# Patient Record
Sex: FEMALE | Race: ASIAN | NOT HISPANIC OR LATINO
[De-identification: names, ages, dates, MRNs, and addresses within clinical notes are randomized per-mention and may not be internally consistent; named-entity substitution may affect disease eponyms.]

---

## 2017-03-13 ENCOUNTER — LAB SERVICES (OUTPATIENT)
Dept: OTHER | Age: 47
End: 2017-03-13

## 2017-03-13 ENCOUNTER — CHARTING TRANS (OUTPATIENT)
Dept: RHEUMATOLOGY | Age: 47
End: 2017-03-13

## 2017-03-13 LAB
25(OH)D3 SERPL-MCNC: 16.7 NG/ML (ref 30–100)
ALBUMIN SERPL BCG-MCNC: 4.3 G/DL (ref 3.6–5.1)
ALP SERPL-CCNC: 115 U/L (ref 45–105)
ALT SERPL W/O P-5'-P-CCNC: 13 U/L (ref 7–34)
ANA SER QL IA: NORMAL RATIO (ref 0–0.6)
AST SERPL-CCNC: 15 U/L (ref 9–37)
BILIRUB DIRECT SERPL-MCNC: 0.1 MG/DL (ref 0–0.2)
BILIRUB SERPL-MCNC: 0.3 MG/DL (ref 0–1)
C3 SERPL-MCNC: 119 MG/DL (ref 88–165)
C4 SERPL-MCNC: 45.1 MG/DL (ref 14–44)
CCP IGG FLD IA-ACNC: 2 U/ML (ref 0–7)
CK SERPL-CCNC: 85 U/L (ref 30–135)
CRP SERPL-MCNC: 0.7 MG/DL (ref 0–1)
DSDNA IGG SERPL IA-ACNC: NORMAL [IU]/ML (ref 0–10)
LDH SERPL P TO L-CCNC: 538 U/L (ref 313–618)
PROT SERPL-MCNC: 7 G/DL (ref 6.2–8.1)
RHEUMATOID FACT SERPL-ACNC: <8.6 [IU]/ML
SEDIMENTATION RATE, RBC: 19 MM/H (ref 0–20)
TSH SERPL DL<=0.05 MIU/L-ACNC: 2.33 M[IU]/L (ref 0.3–4.82)

## 2017-03-16 LAB
PATH INTERP SPEC-IMP: NORMAL
PROT SERPL-MCNC: 7.4 G/DL (ref 6.4–8.2)

## 2017-04-13 ENCOUNTER — HOSPITAL (OUTPATIENT)
Dept: OTHER | Age: 47
End: 2017-04-13
Attending: INTERNAL MEDICINE

## 2017-04-13 ENCOUNTER — CHARTING TRANS (OUTPATIENT)
Dept: OTHER | Age: 47
End: 2017-04-13

## 2017-07-28 DIAGNOSIS — R56.9 SEIZURE (HCC): Primary | ICD-10-CM

## 2017-07-28 RX ORDER — LEVETIRACETAM 250 MG/1
TABLET ORAL
Qty: 90 TABLET | Refills: 0 | Status: SHIPPED | OUTPATIENT
Start: 2017-07-28 | End: 2017-08-21

## 2017-08-21 ENCOUNTER — OFFICE VISIT (OUTPATIENT)
Dept: NEUROLOGY | Facility: CLINIC | Age: 47
End: 2017-08-21

## 2017-08-21 VITALS
WEIGHT: 179 LBS | SYSTOLIC BLOOD PRESSURE: 107 MMHG | HEIGHT: 66 IN | BODY MASS INDEX: 28.77 KG/M2 | DIASTOLIC BLOOD PRESSURE: 62 MMHG | HEART RATE: 74 BPM | RESPIRATION RATE: 16 BRPM

## 2017-08-21 DIAGNOSIS — R56.9 PSEUDOSEIZURES (HCC): ICD-10-CM

## 2017-08-21 DIAGNOSIS — G25.0 TREMOR, ESSENTIAL: ICD-10-CM

## 2017-08-21 DIAGNOSIS — G43.009 MIGRAINE WITHOUT AURA AND WITHOUT STATUS MIGRAINOSUS, NOT INTRACTABLE: ICD-10-CM

## 2017-08-21 DIAGNOSIS — G40.209 COMPLEX PARTIAL SEIZURE EVOLVING TO GENERALIZED SEIZURE (HCC): Primary | ICD-10-CM

## 2017-08-21 PROCEDURE — 99214 OFFICE O/P EST MOD 30 MIN: CPT | Performed by: OTHER

## 2017-08-21 RX ORDER — LEVETIRACETAM 250 MG/1
250 TABLET ORAL
COMMUNITY
End: 2017-08-21

## 2017-08-21 RX ORDER — SUMATRIPTAN 100 MG/1
100 TABLET, FILM COATED ORAL
Qty: 9 TABLET | Refills: 5 | Status: SHIPPED | OUTPATIENT
Start: 2017-08-21 | End: 2018-04-20

## 2017-08-21 RX ORDER — LEVETIRACETAM 250 MG/1
TABLET ORAL
Qty: 120 TABLET | Refills: 5 | Status: SHIPPED | OUTPATIENT
Start: 2017-08-21 | End: 2018-02-27

## 2017-08-21 NOTE — PATIENT INSTRUCTIONS
Refill policies:    • Allow 2-3 business days for refills; controlled substances may take longer.   • Contact your pharmacy at least 5 days prior to running out of medication and have them send an electronic request or submit request through the [facility] have a procedure or additional testing performed. [facility]) will contact your insurance carrier to obtain pre-certification or prior authorization.     Unfortunately, [facility] has seen an increase in denial of payment even though the p

## 2017-08-21 NOTE — PROGRESS NOTES
[facility] with [address]  7/2/1970  Primary Care Provider:  Ruby Rogers MD    8/21/2017    49year old yo patient being seen for:  Pseudo-seizures    No spells for a w Resp 16   Ht 66\"   Wt 179 lb   BMI 28.89 kg/m²   Looks stated age  Pink conjunctiva anicteric sclerae, moist mucosa  No LAD, no thyromegaly  Lungs clear breath sounds  Heart S1S2, no abnormal sounds  Pink nailbeds no Cyanosis, pulses palpated    Alert wi

## 2017-08-30 ENCOUNTER — HOSPITAL ENCOUNTER (OUTPATIENT)
Dept: MRI IMAGING | Age: 47
Discharge: HOME | End: 2017-08-30
Attending: Other
Payer: COMMERCIAL

## 2017-08-30 DIAGNOSIS — G25.0 TREMOR, ESSENTIAL: ICD-10-CM

## 2017-08-30 DIAGNOSIS — G40.209 COMPLEX PARTIAL SEIZURE EVOLVING TO GENERALIZED SEIZURE (HCC): ICD-10-CM

## 2017-08-30 DIAGNOSIS — R56.9 PSEUDOSEIZURES (HCC): ICD-10-CM

## 2017-08-30 DIAGNOSIS — G43.009 MIGRAINE WITHOUT AURA AND WITHOUT STATUS MIGRAINOSUS, NOT INTRACTABLE: ICD-10-CM

## 2017-08-30 PROCEDURE — A9575 INJ GADOTERATE MEGLUMI 0.1ML: HCPCS | Performed by: OTHER

## 2017-08-30 PROCEDURE — 70553 MRI BRAIN STEM W/O & W/DYE: CPT | Performed by: OTHER

## 2017-12-25 ENCOUNTER — APPOINTMENT (OUTPATIENT)
Dept: GENERAL RADIOLOGY | Facility: HOSPITAL | Age: 47
End: 2017-12-25
Payer: COMMERCIAL

## 2017-12-25 ENCOUNTER — HOSPITAL ENCOUNTER (EMERGENCY)
Facility: HOSPITAL | Age: 47
Discharge: HOME | End: 2017-12-25
Attending: EMERGENCY MEDICINE
Payer: COMMERCIAL

## 2017-12-25 VITALS
DIASTOLIC BLOOD PRESSURE: 73 MMHG | HEART RATE: 71 BPM | TEMPERATURE: 98 F | RESPIRATION RATE: 18 BRPM | WEIGHT: 172 LBS | OXYGEN SATURATION: 96 % | HEIGHT: 66 IN | BODY MASS INDEX: 27.64 KG/M2 | SYSTOLIC BLOOD PRESSURE: 105 MMHG

## 2017-12-25 DIAGNOSIS — R09.1 PLEURISY: Primary | ICD-10-CM

## 2017-12-25 PROCEDURE — 80048 BASIC METABOLIC PNL TOTAL CA: CPT | Performed by: EMERGENCY MEDICINE

## 2017-12-25 PROCEDURE — 85025 COMPLETE CBC W/AUTO DIFF WBC: CPT | Performed by: EMERGENCY MEDICINE

## 2017-12-25 PROCEDURE — 85378 FIBRIN DEGRADE SEMIQUANT: CPT | Performed by: EMERGENCY MEDICINE

## 2017-12-25 PROCEDURE — 36415 COLL VENOUS BLD VENIPUNCTURE: CPT

## 2017-12-25 PROCEDURE — 71010 XR CHEST AP PORTABLE  (CPT=71010): CPT | Performed by: EMERGENCY MEDICINE

## 2017-12-25 PROCEDURE — 99284 EMERGENCY DEPT VISIT MOD MDM: CPT

## 2017-12-25 PROCEDURE — 94640 AIRWAY INHALATION TREATMENT: CPT

## 2017-12-25 RX ORDER — PREDNISONE 20 MG/1
60 TABLET ORAL ONCE
Status: COMPLETED | OUTPATIENT
Start: 2017-12-25 | End: 2017-12-25

## 2017-12-25 RX ORDER — IPRATROPIUM BROMIDE AND ALBUTEROL SULFATE 2.5; .5 MG/3ML; MG/3ML
3 SOLUTION RESPIRATORY (INHALATION) ONCE AS NEEDED
Status: COMPLETED | OUTPATIENT
Start: 2017-12-25 | End: 2017-12-25

## 2017-12-25 RX ORDER — HYDROCODONE BITARTRATE AND ACETAMINOPHEN 5; 325 MG/1; MG/1
2 TABLET ORAL ONCE
Status: COMPLETED | OUTPATIENT
Start: 2017-12-25 | End: 2017-12-25

## 2017-12-25 RX ORDER — TOPIRAMATE 50 MG/1
100 TABLET, FILM COATED ORAL 2 TIMES DAILY
COMMUNITY
End: 2020-01-24 | Stop reason: DRUGHIGH

## 2017-12-25 RX ORDER — PREDNISONE 50 MG/1
50 TABLET ORAL DAILY
Qty: 10 TABLET | Refills: 0 | Status: SHIPPED | OUTPATIENT
Start: 2017-12-25 | End: 2017-12-30

## 2017-12-25 RX ORDER — HYDROCODONE BITARTRATE AND ACETAMINOPHEN 5; 325 MG/1; MG/1
1-2 TABLET ORAL EVERY 4 HOURS PRN
Qty: 20 TABLET | Refills: 0 | Status: SHIPPED | OUTPATIENT
Start: 2017-12-25 | End: 2018-01-01

## 2017-12-26 NOTE — ED PROVIDER NOTES
Patient Seen in: [facility] Emergency Department    History   Patient presents with:  Dyspnea MOHIT SOB (respiratory)    Stated Complaint: mohit    HPI    Patient is a 50-year-old woman history of asthma comes in for left-sided constant chest pain start Temporal [12/25/17 2056]  SpO2: 100 % [12/25/17 2056]  O2 Device: None (Room air) [12/25/17 2055]    Current:/80   Pulse 80   Temp 98 °F (36.7 °C) (Temporal)   Resp 20   Ht 167.6 cm (5' 6\")   Wt 78 kg   SpO2 99%   BMI 27.76 kg/m²         Physical Ex Trimester:  0.32-1.29 ug/mL (FEU)    3rd Trimester:  0.13-1.70 ug/mL (FEU)    In pregnant females, refer to the chart above.   No studies have been performed  on pregnant females exclusively to validate the 95% negative predictive value  for venous thromboe cardiac monitor and pulse oximetry was applied. Patient had an IV established and labs were drawn. Patient is given albuterol Atrovent for wheezing. She feels much better.   Patient chest discomfort is reproducible pleuritic and constant unlikely to

## 2017-12-26 NOTE — ED INITIAL ASSESSMENT (HPI)
Pt reports worsening cough w/ SOB - has hx of asthma and using inhaler w/ no relief    Reports bronchitis last week and getting tx for it

## 2017-12-31 ENCOUNTER — APPOINTMENT (OUTPATIENT)
Dept: CT IMAGING | Facility: HOSPITAL | Age: 47
End: 2017-12-31
Attending: EMERGENCY MEDICINE
Payer: COMMERCIAL

## 2017-12-31 ENCOUNTER — APPOINTMENT (OUTPATIENT)
Dept: GENERAL RADIOLOGY | Facility: HOSPITAL | Age: 47
End: 2017-12-31
Attending: EMERGENCY MEDICINE
Payer: COMMERCIAL

## 2017-12-31 ENCOUNTER — HOSPITAL ENCOUNTER (EMERGENCY)
Facility: HOSPITAL | Age: 47
Discharge: HOME | End: 2017-12-31
Attending: EMERGENCY MEDICINE
Payer: COMMERCIAL

## 2017-12-31 VITALS
BODY MASS INDEX: 27.63 KG/M2 | RESPIRATION RATE: 18 BRPM | OXYGEN SATURATION: 100 % | TEMPERATURE: 97 F | HEIGHT: 66 IN | WEIGHT: 171.94 LBS | DIASTOLIC BLOOD PRESSURE: 81 MMHG | HEART RATE: 80 BPM | SYSTOLIC BLOOD PRESSURE: 123 MMHG

## 2017-12-31 DIAGNOSIS — R09.1 PLEURISY: Primary | ICD-10-CM

## 2017-12-31 LAB
BASOPHILS # BLD AUTO: 0.01 X10(3) UL (ref 0–0.1)
BASOPHILS NFR BLD AUTO: 0.1 %
BILIRUB UR QL STRIP.AUTO: NEGATIVE
BUN BLD-MCNC: 14 MG/DL (ref 8–20)
CALCIUM BLD-MCNC: 9.4 MG/DL (ref 8.3–10.3)
CHLORIDE: 105 MMOL/L (ref 101–111)
CLARITY UR REFRACT.AUTO: CLEAR
CO2: 27 MMOL/L (ref 22–32)
COLOR UR AUTO: COLORLESS
CREAT BLD-MCNC: 0.76 MG/DL (ref 0.55–1.02)
EOSINOPHIL # BLD AUTO: 0 X10(3) UL (ref 0–0.3)
EOSINOPHIL NFR BLD AUTO: 0 %
ERYTHROCYTE [DISTWIDTH] IN BLOOD BY AUTOMATED COUNT: 13.5 % (ref 11.5–16)
GLUCOSE BLD-MCNC: 128 MG/DL (ref 70–99)
GLUCOSE UR STRIP.AUTO-MCNC: NEGATIVE MG/DL
HCT VFR BLD AUTO: 46.2 % (ref 34–50)
HGB BLD-MCNC: 15.2 G/DL (ref 12–16)
IMMATURE GRANULOCYTE COUNT: 0.03 X10(3) UL (ref 0–1)
IMMATURE GRANULOCYTE RATIO %: 0.4 %
KETONES UR STRIP.AUTO-MCNC: NEGATIVE MG/DL
LEUKOCYTE ESTERASE UR QL STRIP.AUTO: NEGATIVE
LYMPHOCYTES # BLD AUTO: 0.86 X10(3) UL (ref 0.9–4)
LYMPHOCYTES NFR BLD AUTO: 10.3 %
MCH RBC QN AUTO: 28.7 PG (ref 27–33.2)
MCHC RBC AUTO-ENTMCNC: 32.9 G/DL (ref 31–37)
MCV RBC AUTO: 87.2 FL (ref 81–100)
MONOCYTES # BLD AUTO: 0.07 X10(3) UL (ref 0.1–0.6)
MONOCYTES NFR BLD AUTO: 0.8 %
NEUTROPHIL ABS PRELIM: 7.34 X10 (3) UL (ref 1.3–6.7)
NEUTROPHILS # BLD AUTO: 7.34 X10(3) UL (ref 1.3–6.7)
NEUTROPHILS NFR BLD AUTO: 88.4 %
NITRITE UR QL STRIP.AUTO: NEGATIVE
PH UR STRIP.AUTO: 8 [PH] (ref 4.5–8)
PLATELET # BLD AUTO: 259 10(3)UL (ref 150–450)
POCT LOT NUMBER: NORMAL
POCT URINE PREGNANCY: NEGATIVE
POTASSIUM SERPL-SCNC: 3.7 MMOL/L (ref 3.6–5.1)
PROT UR STRIP.AUTO-MCNC: NEGATIVE MG/DL
RBC # BLD AUTO: 5.3 X10(6)UL (ref 3.8–5.1)
RED CELL DISTRIBUTION WIDTH-SD: 42.7 FL (ref 35.1–46.3)
SODIUM SERPL-SCNC: 140 MMOL/L (ref 136–144)
SP GR UR STRIP.AUTO: <1.005 (ref 1–1.03)
UROBILINOGEN UR STRIP.AUTO-MCNC: <2 MG/DL
WBC # BLD AUTO: 8.3 X10(3) UL (ref 4–13)

## 2017-12-31 PROCEDURE — 93010 ELECTROCARDIOGRAM REPORT: CPT

## 2017-12-31 PROCEDURE — 96374 THER/PROPH/DIAG INJ IV PUSH: CPT

## 2017-12-31 PROCEDURE — 71010 XR CHEST AP PORTABLE  (CPT=71010): CPT | Performed by: EMERGENCY MEDICINE

## 2017-12-31 PROCEDURE — 71275 CT ANGIOGRAPHY CHEST: CPT | Performed by: EMERGENCY MEDICINE

## 2017-12-31 PROCEDURE — 80048 BASIC METABOLIC PNL TOTAL CA: CPT | Performed by: EMERGENCY MEDICINE

## 2017-12-31 PROCEDURE — 93005 ELECTROCARDIOGRAM TRACING: CPT

## 2017-12-31 PROCEDURE — 81025 URINE PREGNANCY TEST: CPT

## 2017-12-31 PROCEDURE — 96375 TX/PRO/DX INJ NEW DRUG ADDON: CPT

## 2017-12-31 PROCEDURE — 85025 COMPLETE CBC W/AUTO DIFF WBC: CPT | Performed by: EMERGENCY MEDICINE

## 2017-12-31 PROCEDURE — 99285 EMERGENCY DEPT VISIT HI MDM: CPT

## 2017-12-31 PROCEDURE — 81001 URINALYSIS AUTO W/SCOPE: CPT | Performed by: EMERGENCY MEDICINE

## 2017-12-31 RX ORDER — KETOROLAC TROMETHAMINE 30 MG/ML
15 INJECTION, SOLUTION INTRAMUSCULAR; INTRAVENOUS ONCE
Status: COMPLETED | OUTPATIENT
Start: 2017-12-31 | End: 2017-12-31

## 2017-12-31 RX ORDER — CYCLOBENZAPRINE HCL 10 MG
10 TABLET ORAL 3 TIMES DAILY PRN
Qty: 20 TABLET | Refills: 0 | Status: SHIPPED | OUTPATIENT
Start: 2017-12-31 | End: 2018-01-07

## 2017-12-31 RX ORDER — LIDOCAINE 50 MG/G
1 PATCH TOPICAL EVERY 24 HOURS
Qty: 15 PATCH | Refills: 0 | Status: SHIPPED | OUTPATIENT
Start: 2017-12-31 | End: 2018-03-02

## 2017-12-31 RX ORDER — MORPHINE SULFATE 4 MG/ML
4 INJECTION, SOLUTION INTRAMUSCULAR; INTRAVENOUS EVERY 30 MIN PRN
Status: DISCONTINUED | OUTPATIENT
Start: 2017-12-31 | End: 2017-12-31

## 2017-12-31 NOTE — ED INITIAL ASSESSMENT (HPI)
Pt was here on 24th - dx w/ pleurisy. C/O increasing pain over the last few days, unable to take deep breath, and lay on left side.

## 2017-12-31 NOTE — ED PROVIDER NOTES
Patient Seen in: [facility] Emergency Department    History   Patient presents with:  Pain (neurologic)    Stated Complaint: luq pain     HPI    54-year-old with a history of seizures, migraines, GERD, back pain, fibromyalgia presents for evaluation Other systems are as noted in HPI. Constitutional and vital signs reviewed. All other systems reviewed and negative except as noted above.     Physical Exam   ED Triage Vitals [12/31/17 1003]  BP: 136/91  Pulse: 98  Resp: 18  Temp: (!) 97.4 °F (36.3 ---------                               -----------         ------                     CBC W/ DIFFERENTIAL[[id number]]          Abnormal            Final result                 Please view results for these tests on the individual orders.    RAINBOW DRAW B Prescribed:  Current Discharge Medication List    START taking these medications    lidocaine 5 % External Patch  Place 1 patch onto the skin daily.   Qty: 15 patch Refills: 0    Cyclobenzaprine HCl 10 MG Oral Tab  Take 1 tablet (10 mg total) by mouth 3 (th

## 2018-01-01 LAB
ATRIAL RATE: 80 BPM
P AXIS: 22 DEGREES
P-R INTERVAL: 144 MS
Q-T INTERVAL: 368 MS
QRS DURATION: 86 MS
QTC CALCULATION (BEZET): 424 MS
R AXIS: 44 DEGREES
T AXIS: 34 DEGREES
VENTRICULAR RATE: 80 BPM

## 2018-01-02 ENCOUNTER — HOSPITAL ENCOUNTER (INPATIENT)
Facility: HOSPITAL | Age: 48
LOS: 1 days | Discharge: HOME | DRG: 313 | End: 2018-01-04
Attending: EMERGENCY MEDICINE | Admitting: SPECIALIST
Payer: COMMERCIAL

## 2018-01-02 ENCOUNTER — APPOINTMENT (OUTPATIENT)
Dept: GENERAL RADIOLOGY | Facility: HOSPITAL | Age: 48
DRG: 313 | End: 2018-01-02
Attending: EMERGENCY MEDICINE
Payer: COMMERCIAL

## 2018-01-02 DIAGNOSIS — R07.9 ACUTE CHEST PAIN: Primary | ICD-10-CM

## 2018-01-02 LAB
ALBUMIN SERPL-MCNC: 3.6 G/DL (ref 3.5–4.8)
ALP LIVER SERPL-CCNC: 105 U/L (ref 39–100)
ALT SERPL-CCNC: 27 U/L (ref 14–54)
AST SERPL-CCNC: 25 U/L (ref 15–41)
BASOPHILS # BLD AUTO: 0.02 X10(3) UL (ref 0–0.1)
BASOPHILS NFR BLD AUTO: 0.2 %
BILIRUB SERPL-MCNC: 0.3 MG/DL (ref 0.1–2)
BUN BLD-MCNC: 14 MG/DL (ref 8–20)
CALCIUM BLD-MCNC: 9.1 MG/DL (ref 8.3–10.3)
CHLORIDE: 109 MMOL/L (ref 101–111)
CO2: 22 MMOL/L (ref 22–32)
CREAT BLD-MCNC: 0.91 MG/DL (ref 0.55–1.02)
EOSINOPHIL # BLD AUTO: 0 X10(3) UL (ref 0–0.3)
EOSINOPHIL NFR BLD AUTO: 0 %
ERYTHROCYTE [DISTWIDTH] IN BLOOD BY AUTOMATED COUNT: 13.7 % (ref 11.5–16)
GLUCOSE BLD-MCNC: 149 MG/DL (ref 70–99)
HCT VFR BLD AUTO: 48.1 % (ref 34–50)
HGB BLD-MCNC: 15.8 G/DL (ref 12–16)
IMMATURE GRANULOCYTE COUNT: 0.07 X10(3) UL (ref 0–1)
IMMATURE GRANULOCYTE RATIO %: 0.7 %
LYMPHOCYTES # BLD AUTO: 1.22 X10(3) UL (ref 0.9–4)
LYMPHOCYTES NFR BLD AUTO: 12.4 %
M PROTEIN MFR SERPL ELPH: 7.8 G/DL (ref 6.1–8.3)
MCH RBC QN AUTO: 28.7 PG (ref 27–33.2)
MCHC RBC AUTO-ENTMCNC: 32.8 G/DL (ref 31–37)
MCV RBC AUTO: 87.5 FL (ref 81–100)
MONOCYTES # BLD AUTO: 0.14 X10(3) UL (ref 0.1–0.6)
MONOCYTES NFR BLD AUTO: 1.4 %
NEUTROPHIL ABS PRELIM: 8.42 X10 (3) UL (ref 1.3–6.7)
NEUTROPHILS # BLD AUTO: 8.42 X10(3) UL (ref 1.3–6.7)
NEUTROPHILS NFR BLD AUTO: 85.3 %
PLATELET # BLD AUTO: 284 10(3)UL (ref 150–450)
POTASSIUM SERPL-SCNC: 4.3 MMOL/L (ref 3.6–5.1)
RBC # BLD AUTO: 5.5 X10(6)UL (ref 3.8–5.1)
RED CELL DISTRIBUTION WIDTH-SD: 44.1 FL (ref 35.1–46.3)
SODIUM SERPL-SCNC: 139 MMOL/L (ref 136–144)
TROPONIN: <0.046 NG/ML (ref ?–0.05)
WBC # BLD AUTO: 9.9 X10(3) UL (ref 4–13)

## 2018-01-02 PROCEDURE — 71045 X-RAY EXAM CHEST 1 VIEW: CPT | Performed by: EMERGENCY MEDICINE

## 2018-01-02 RX ORDER — SODIUM CHLORIDE 9 MG/ML
INJECTION, SOLUTION INTRAVENOUS CONTINUOUS
Status: DISCONTINUED | OUTPATIENT
Start: 2018-01-02 | End: 2018-01-04

## 2018-01-02 RX ORDER — BISACODYL 10 MG
10 SUPPOSITORY, RECTAL RECTAL
Status: DISCONTINUED | OUTPATIENT
Start: 2018-01-02 | End: 2018-01-04

## 2018-01-02 RX ORDER — ONDANSETRON 2 MG/ML
4 INJECTION INTRAMUSCULAR; INTRAVENOUS EVERY 6 HOURS PRN
Status: DISCONTINUED | OUTPATIENT
Start: 2018-01-02 | End: 2018-01-04

## 2018-01-02 RX ORDER — ENOXAPARIN SODIUM 100 MG/ML
40 INJECTION SUBCUTANEOUS NIGHTLY
Status: DISCONTINUED | OUTPATIENT
Start: 2018-01-02 | End: 2018-01-04

## 2018-01-02 RX ORDER — CYCLOBENZAPRINE HCL 10 MG
10 TABLET ORAL 3 TIMES DAILY PRN
Status: DISCONTINUED | OUTPATIENT
Start: 2018-01-02 | End: 2018-01-04

## 2018-01-02 RX ORDER — HYDROCODONE BITARTRATE AND ACETAMINOPHEN 5; 325 MG/1; MG/1
1 TABLET ORAL EVERY 4 HOURS PRN
Status: DISCONTINUED | OUTPATIENT
Start: 2018-01-02 | End: 2018-01-04

## 2018-01-02 RX ORDER — HYDROMORPHONE HYDROCHLORIDE 1 MG/ML
1 INJECTION, SOLUTION INTRAMUSCULAR; INTRAVENOUS; SUBCUTANEOUS EVERY 30 MIN PRN
Status: COMPLETED | OUTPATIENT
Start: 2018-01-02 | End: 2018-01-03

## 2018-01-02 RX ORDER — HYDROMORPHONE HYDROCHLORIDE 1 MG/ML
0.5 INJECTION, SOLUTION INTRAMUSCULAR; INTRAVENOUS; SUBCUTANEOUS EVERY 30 MIN PRN
Status: ACTIVE | OUTPATIENT
Start: 2018-01-02 | End: 2018-01-02

## 2018-01-02 RX ORDER — METHYLPREDNISOLONE SODIUM SUCCINATE 125 MG/2ML
125 INJECTION, POWDER, LYOPHILIZED, FOR SOLUTION INTRAMUSCULAR; INTRAVENOUS ONCE
Status: COMPLETED | OUTPATIENT
Start: 2018-01-02 | End: 2018-01-02

## 2018-01-02 RX ORDER — ZOLPIDEM TARTRATE 5 MG/1
5 TABLET ORAL NIGHTLY PRN
Status: DISCONTINUED | OUTPATIENT
Start: 2018-01-02 | End: 2018-01-04

## 2018-01-02 RX ORDER — POLYETHYLENE GLYCOL 3350 17 G/17G
17 POWDER, FOR SOLUTION ORAL DAILY PRN
Status: DISCONTINUED | OUTPATIENT
Start: 2018-01-02 | End: 2018-01-04

## 2018-01-02 RX ORDER — SUMATRIPTAN 50 MG/1
100 TABLET, FILM COATED ORAL
Status: DISCONTINUED | OUTPATIENT
Start: 2018-01-02 | End: 2018-01-04

## 2018-01-02 RX ORDER — ACETAMINOPHEN 325 MG/1
650 TABLET ORAL EVERY 4 HOURS PRN
Status: DISCONTINUED | OUTPATIENT
Start: 2018-01-02 | End: 2018-01-04

## 2018-01-02 RX ORDER — MORPHINE SULFATE 4 MG/ML
1 INJECTION, SOLUTION INTRAMUSCULAR; INTRAVENOUS EVERY 2 HOUR PRN
Status: DISCONTINUED | OUTPATIENT
Start: 2018-01-02 | End: 2018-01-03 | Stop reason: SDUPTHER

## 2018-01-02 RX ORDER — SODIUM PHOSPHATE, DIBASIC AND SODIUM PHOSPHATE, MONOBASIC 7; 19 G/133ML; G/133ML
1 ENEMA RECTAL ONCE AS NEEDED
Status: DISCONTINUED | OUTPATIENT
Start: 2018-01-02 | End: 2018-01-04

## 2018-01-02 RX ORDER — MORPHINE SULFATE 4 MG/ML
2 INJECTION, SOLUTION INTRAMUSCULAR; INTRAVENOUS EVERY 2 HOUR PRN
Status: DISCONTINUED | OUTPATIENT
Start: 2018-01-02 | End: 2018-01-03 | Stop reason: SDUPTHER

## 2018-01-02 RX ORDER — TOPIRAMATE 25 MG/1
50 TABLET ORAL 2 TIMES DAILY
Status: DISCONTINUED | OUTPATIENT
Start: 2018-01-02 | End: 2018-01-04

## 2018-01-02 RX ORDER — LEVETIRACETAM 250 MG/1
250 TABLET ORAL 2 TIMES DAILY
Status: DISCONTINUED | OUTPATIENT
Start: 2018-01-02 | End: 2018-01-03

## 2018-01-02 RX ORDER — HYDROCODONE BITARTRATE AND ACETAMINOPHEN 5; 325 MG/1; MG/1
2 TABLET ORAL EVERY 4 HOURS PRN
Status: DISCONTINUED | OUTPATIENT
Start: 2018-01-02 | End: 2018-01-04

## 2018-01-02 RX ORDER — DOCUSATE SODIUM 100 MG/1
100 CAPSULE, LIQUID FILLED ORAL 2 TIMES DAILY
Status: DISCONTINUED | OUTPATIENT
Start: 2018-01-02 | End: 2018-01-04

## 2018-01-02 RX ORDER — ALBUTEROL SULFATE 90 UG/1
2 AEROSOL, METERED RESPIRATORY (INHALATION) EVERY 6 HOURS PRN
Status: DISCONTINUED | OUTPATIENT
Start: 2018-01-02 | End: 2018-01-04

## 2018-01-02 RX ORDER — MORPHINE SULFATE 4 MG/ML
4 INJECTION, SOLUTION INTRAMUSCULAR; INTRAVENOUS EVERY 2 HOUR PRN
Status: DISCONTINUED | OUTPATIENT
Start: 2018-01-02 | End: 2018-01-03 | Stop reason: SDUPTHER

## 2018-01-03 ENCOUNTER — APPOINTMENT (OUTPATIENT)
Dept: CV DIAGNOSTICS | Facility: HOSPITAL | Age: 48
DRG: 313 | End: 2018-01-03
Attending: SPECIALIST
Payer: COMMERCIAL

## 2018-01-03 ENCOUNTER — APPOINTMENT (OUTPATIENT)
Dept: MRI IMAGING | Facility: HOSPITAL | Age: 48
DRG: 313 | End: 2018-01-03
Attending: Other
Payer: COMMERCIAL

## 2018-01-03 LAB
ALBUMIN SERPL-MCNC: 3.1 G/DL (ref 3.5–4.8)
ALP LIVER SERPL-CCNC: 80 U/L (ref 39–100)
ALT SERPL-CCNC: 21 U/L (ref 14–54)
AST SERPL-CCNC: 13 U/L (ref 15–41)
ATRIAL RATE: 101 BPM
BASOPHILS # BLD AUTO: 0.01 X10(3) UL (ref 0–0.1)
BASOPHILS NFR BLD AUTO: 0.1 %
BILIRUB SERPL-MCNC: 0.2 MG/DL (ref 0.1–2)
BUN BLD-MCNC: 17 MG/DL (ref 8–20)
CALCIUM BLD-MCNC: 8.4 MG/DL (ref 8.3–10.3)
CHLORIDE: 107 MMOL/L (ref 101–111)
CHOLEST SMN-MCNC: 223 MG/DL (ref ?–200)
CO2: 22 MMOL/L (ref 22–32)
CREAT BLD-MCNC: 0.77 MG/DL (ref 0.55–1.02)
EOSINOPHIL # BLD AUTO: 0 X10(3) UL (ref 0–0.3)
EOSINOPHIL NFR BLD AUTO: 0 %
ERYTHROCYTE [DISTWIDTH] IN BLOOD BY AUTOMATED COUNT: 13.6 % (ref 11.5–16)
GLUCOSE BLD-MCNC: 140 MG/DL (ref 70–99)
HCT VFR BLD AUTO: 41.6 % (ref 34–50)
HDLC SERPL-MCNC: 94 MG/DL (ref 45–?)
HDLC SERPL: 2.37 {RATIO} (ref ?–4.44)
HGB BLD-MCNC: 13.6 G/DL (ref 12–16)
IMMATURE GRANULOCYTE COUNT: 0.08 X10(3) UL (ref 0–1)
IMMATURE GRANULOCYTE RATIO %: 0.7 %
LDLC SERPL CALC-MCNC: 90 MG/DL (ref ?–130)
LYMPHOCYTES # BLD AUTO: 1.03 X10(3) UL (ref 0.9–4)
LYMPHOCYTES NFR BLD AUTO: 8.6 %
M PROTEIN MFR SERPL ELPH: 6.4 G/DL (ref 6.1–8.3)
MCH RBC QN AUTO: 28.9 PG (ref 27–33.2)
MCHC RBC AUTO-ENTMCNC: 32.7 G/DL (ref 31–37)
MCV RBC AUTO: 88.5 FL (ref 81–100)
MONOCYTES # BLD AUTO: 0.08 X10(3) UL (ref 0.1–0.6)
MONOCYTES NFR BLD AUTO: 0.7 %
NEUTROPHIL ABS PRELIM: 10.75 X10 (3) UL (ref 1.3–6.7)
NEUTROPHILS # BLD AUTO: 10.75 X10(3) UL (ref 1.3–6.7)
NEUTROPHILS NFR BLD AUTO: 89.9 %
NONHDLC SERPL-MCNC: 129 MG/DL (ref ?–130)
P AXIS: 28 DEGREES
P-R INTERVAL: 132 MS
PLATELET # BLD AUTO: 242 10(3)UL (ref 150–450)
POTASSIUM SERPL-SCNC: 4 MMOL/L (ref 3.6–5.1)
Q-T INTERVAL: 334 MS
QRS DURATION: 82 MS
QTC CALCULATION (BEZET): 433 MS
R AXIS: 45 DEGREES
RBC # BLD AUTO: 4.7 X10(6)UL (ref 3.8–5.1)
RED CELL DISTRIBUTION WIDTH-SD: 44 FL (ref 35.1–46.3)
SED RATE-ML: 7 MM/HR (ref 0–25)
SODIUM SERPL-SCNC: 140 MMOL/L (ref 136–144)
T AXIS: 48 DEGREES
TRIGL SERPL-MCNC: 193 MG/DL (ref ?–150)
VENTRICULAR RATE: 101 BPM
VLDLC SERPL CALC-MCNC: 39 MG/DL (ref 5–40)
WBC # BLD AUTO: 12 X10(3) UL (ref 4–13)

## 2018-01-03 PROCEDURE — 93306 TTE W/DOPPLER COMPLETE: CPT | Performed by: SPECIALIST

## 2018-01-03 PROCEDURE — 72157 MRI CHEST SPINE W/O & W/DYE: CPT | Performed by: OTHER

## 2018-01-03 RX ORDER — MORPHINE SULFATE 2 MG/ML
2 INJECTION, SOLUTION INTRAMUSCULAR; INTRAVENOUS EVERY 2 HOUR PRN
Status: DISCONTINUED | OUTPATIENT
Start: 2018-01-03 | End: 2018-01-04

## 2018-01-03 RX ORDER — LEVETIRACETAM 500 MG/1
500 TABLET ORAL 2 TIMES DAILY
Status: DISCONTINUED | OUTPATIENT
Start: 2018-01-03 | End: 2018-01-04

## 2018-01-03 RX ORDER — MORPHINE SULFATE 2 MG/ML
4 INJECTION, SOLUTION INTRAMUSCULAR; INTRAVENOUS EVERY 2 HOUR PRN
Status: DISCONTINUED | OUTPATIENT
Start: 2018-01-03 | End: 2018-01-04

## 2018-01-03 RX ORDER — MORPHINE SULFATE 2 MG/ML
1 INJECTION, SOLUTION INTRAMUSCULAR; INTRAVENOUS EVERY 2 HOUR PRN
Status: DISCONTINUED | OUTPATIENT
Start: 2018-01-03 | End: 2018-01-04

## 2018-01-03 NOTE — ED NOTES
Pt crying, hyperventilating, appears very anxious. She was holding on to her L chest, \"it hurts, it's tight. \"  aware. IV Dilaudid given. Pt advised to relax and slow down breathing, not compliant at this time.  Will monitor
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
## 2018-01-03 NOTE — H&P
[address]    PATIENT'S NAME: Mary Ann Mendiola   ATTENDING PHYSICIAN: Joey De La Rosa M.D.    PATIENT ACCOUNT#:   [de-identified]    LOCATION:  [address] [place] [address]  MEDICAL RECORD #:   [id number]       YOB: 1970  ADMISSION DATE:       01/0 smoking, alcohol, or drug abuse. PHYSICAL EXAMINATION:    GENERAL:  Condition fair, awake, alert. HEENT:  Head atraumatic. Eyes EOMI. NECK:  Supple. No JVD. LUNGS:  Clear to auscultation. No rhonchi or wheeze. HEART:  Regular rate and rhythm.   No

## 2018-01-03 NOTE — ED PROVIDER NOTES
Patient Seen in: [facility] Emergency Department    History   Patient presents with:  Dyspnea ANIA SOB (respiratory)    Stated Complaint: shortness of breath    HPI    48-year-old female who comes the hospital with a complaint of having sharp pain in noted above.     Physical Exam   ED Triage Vitals [01/02/18 9224]  BP: 111/65  Pulse: 100  Resp: 15  Temp: (!) 97.5 °F (36.4 °C)  Temp src: Temporal  SpO2: 99 %  O2 Device: None (Room air)    Current:/84   Pulse 98   Temp (!) 97.5 °F (36.4 °C) (Tempor tachycardia  Reading: Agreed         Ct Angiography, Chest (cpt=71275)    Result Date: 12/31/2017  PROCEDURE:  CT ANGIOGRAPHY, CHEST (CPT=71275)  COMPARISON:  None.   INDICATIONS:  luq pain  TECHNIQUE:  IV contrast-enhanced multislice CT angiography is perf week.    FINDINGS:  Lungs are free of infiltrate, effusion, and pneumothorax. The cardiac silhouette is within normal limits. No congestion. CONCLUSION:  1. No acute intrathoracic process.     Dictated by: Alber Valderrama MD on 1/02/2018 at 19:43 2100  ------------------------------------------------------------       MDM     This is this is the patient's third return to the emergency room for the same complaint and worker's been negative.   I feel at this point the patient be placed in the hospital

## 2018-01-03 NOTE — PROGRESS NOTES
01/02/18 2015 01/02/18 2045 01/02/18 2107   Vital Signs   /79 122/84 116/77   BP Location Right arm Right arm Right arm   BP Method Automatic Automatic Automatic   Patient Position Lying Sitting Standing     Orthostatic neg    Hx: seizures, tremor

## 2018-01-03 NOTE — ED INITIAL ASSESSMENT (HPI)
Pt was seen in ED on 12/31/17 and was diagnosed with pleurisy. Pt still having difficulty breathing and c/o pain left flank.

## 2018-01-03 NOTE — ED NOTES
Assumed care, pt resting, no distress noted. States pain better after Dilaudid given, \"it's 8/10. \" Will monitor

## 2018-01-03 NOTE — PAYOR COMM NOTE
--------------  ADMISSION REVIEW     Payor: [employer] STATE P[employer]  Subscriber #:  [id number]  Authorization Number: N/A    Admit date: N/A  Admit time: N/A       Admitting Physician: Winter Green MD  Attending Physician:   Winter Green MD  Primary disorders(V13.59)    • Seizure disorder [facility])    • Vitamin D deficiency      Past Surgical History:  2001:       Comment: x 1  No date: OPEN RX HEEL FRACTURE Left  : OTHER SURGICAL HISTORY      Comment: cyst removed right hand    Review of Sy Agreed[MP.3]    Ct Angiography, Chest (cpt=71275)  Result Date: 12/31/2017  PROCEDURE:  CT ANGIOGRAPHY, CHEST (CPT=71275)  COMPARISON:  None.   INDICATIONS:  luq pain  TECHNIQUE:  IV contrast-enhanced multislice CT angiography is performed through the pulmo are free of infiltrate, effusion, and pneumothorax. The cardiac silhouette is within normal limits. No congestion. CONCLUSION:  1. No acute intrathoracic process.     Dictated by: Mike Garrett MD on 1/02/2018 at 19:43     Approved by: Donald Wright 2100  ------------------------------------------------------------[MP. 2]   [facility][MP.1]   This is this is the patient's third return to the emergency room for the same complaint and workups been negative.   I feel at this point the patient be placed in the hos inhaler 1 puff     Date Action Dose Route User    1/3/2018 0923 Given 1 puff Inhalation Eveline Escobedo RCP      HYDROmorphone HCl PF (DILAUDID) 1 MG/ML injection 1 mg     Date Action Dose Route User    1/3/2018 1056 Given 1 mg Intravenous Akash Wen

## 2018-01-04 VITALS
RESPIRATION RATE: 20 BRPM | SYSTOLIC BLOOD PRESSURE: 102 MMHG | HEIGHT: 63 IN | TEMPERATURE: 99 F | DIASTOLIC BLOOD PRESSURE: 63 MMHG | HEART RATE: 73 BPM | WEIGHT: 175.5 LBS | BODY MASS INDEX: 31.1 KG/M2 | OXYGEN SATURATION: 95 %

## 2018-01-04 PROCEDURE — 99233 SBSQ HOSP IP/OBS HIGH 50: CPT | Performed by: OTHER

## 2018-01-04 NOTE — CONSULTS
[facility]  Report of Consultation    Alvina Service Patient Status:  Inpatient    1970 MRN [id number]   [facility] 8NE-A Attending Bryanna Sanchez MD   Hosp Day # 1 PCP Charlotte Torres MD     Reason for Consultation:  Chest RA   • Breast Cancer Paternal Aunt 61     alive   • Seizure Disorder Brother      diagnosed in 2004- 3 years younger than her   • Other [OTHER] Other      No adrenal, thyroid, pituitary or parathyroid issues.    • Seizure Disorder Maternal Uncle    • Diabet 10 mg, Rectal, Daily PRN  •  FLEET ENEMA (FLEET) 7-19 GM/118ML enema 133 mL, 1 enema, Rectal, Once PRN  •  ondansetron HCl (ZOFRAN) injection 4 mg, 4 mg, Intravenous, Q6H PRN    Review of Systems:  All systems were reviewed and are negative except as descr Pseudoseizure     Migraine without status migrainosus, not intractable     Convulsion, non-epileptic (HCC)     Intractable migraine     Status epilepticus (HCC)     Seizure cerebral     Migraine without aura and with status migrainosus, not intractable

## 2018-01-04 NOTE — PROGRESS NOTES
[address] [facility] Neurology Progress Note    Cassondra Gowers Patient Status:  Inpatient    1970 MRN [id number]   [facility] 8NE-A Attending Venus Cardoza MD   Hosp Day # 1 PCP Hector Gomez MD     CC: Hx of seizures    Subj cardiac in nature  · Hx of seizures- no recent  · Continue Keppra 500mg BID  · Hx of migraines- no recent  · Continue Topamax 50mg BID    Patient with a history of seizures and migraines, who presented for ongoing left flank pain.   MRI of thoracic spine es with her pain and that since there is no objective cause of her pain on my exam I was merely suggesting possibly alternative to her home OTC analgesics.  She yelled and told me not to talk with her like that like I would suggest a medication that may not he

## 2018-01-04 NOTE — PLAN OF CARE
Assumed care at 2045. Alert and oriented x4. IVF infusing. Seizure precautions maintained per protocol. MRI complete. Assessment remains unchanged from beginning of shift. Call light in reach at the bedside. Will continue to monitor.     CARDIOVASCULAR - AD

## 2018-01-04 NOTE — PROGRESS NOTES
[facility]  Discharge Summary    Esperanza Hearing Patient Status:  Inpatient    1970 MRN [id number]   [facility] 8NE-A Attending Zi Alan MD   Hosp Day # 1 PCP Franklin Bay MD     Date of Admission: 2018    Date refer to chart for details    Disposition: Home or Self Care    Discharge Condition: Stable    Discharge Medications: Current Discharge Medication List    CONTINUE these medications which have NOT CHANGED    lidocaine 5 % External Patch  Place 1 patch onto

## 2018-02-28 RX ORDER — LEVETIRACETAM 250 MG/1
TABLET ORAL
Qty: 120 TABLET | Refills: 2 | Status: SHIPPED | OUTPATIENT
Start: 2018-02-28 | End: 2018-03-02

## 2018-02-28 NOTE — TELEPHONE ENCOUNTER
Medication: Keppra 250 mg    Date of last refill: 8/21/17 with 5 addt refills  [place]     Last office visit: 08/21/17  Due back to clinic per last office note:  RTN in 6 months by 02/21/18  Date next office visit schedu

## 2018-03-02 ENCOUNTER — OFFICE VISIT (OUTPATIENT)
Dept: NEUROLOGY | Facility: CLINIC | Age: 48
End: 2018-03-02

## 2018-03-02 VITALS
RESPIRATION RATE: 16 BRPM | HEIGHT: 66 IN | DIASTOLIC BLOOD PRESSURE: 74 MMHG | SYSTOLIC BLOOD PRESSURE: 115 MMHG | BODY MASS INDEX: 28.13 KG/M2 | WEIGHT: 175 LBS | HEART RATE: 80 BPM

## 2018-03-02 DIAGNOSIS — R56.9 SEIZURE (HCC): ICD-10-CM

## 2018-03-02 DIAGNOSIS — M79.671 PAIN OF RIGHT HEEL: ICD-10-CM

## 2018-03-02 DIAGNOSIS — G43.009 MIGRAINE WITHOUT AURA AND WITHOUT STATUS MIGRAINOSUS, NOT INTRACTABLE: Primary | ICD-10-CM

## 2018-03-02 DIAGNOSIS — R56.9 PSEUDOSEIZURES (HCC): ICD-10-CM

## 2018-03-02 DIAGNOSIS — M54.14 THORACIC RADICULOPATHY: ICD-10-CM

## 2018-03-02 DIAGNOSIS — G40.209 COMPLEX PARTIAL SEIZURE EVOLVING TO GENERALIZED SEIZURE (HCC): ICD-10-CM

## 2018-03-02 PROCEDURE — 99214 OFFICE O/P EST MOD 30 MIN: CPT | Performed by: OTHER

## 2018-03-02 RX ORDER — LEVETIRACETAM 500 MG/1
500 TABLET ORAL 2 TIMES DAILY
COMMUNITY
End: 2018-05-15

## 2018-03-02 RX ORDER — NICOTINE POLACRILEX 4 MG/1
40 GUM, CHEWING ORAL DAILY
COMMUNITY
End: 2018-10-03

## 2018-03-02 NOTE — PATIENT INSTRUCTIONS
Refill policies:    • Allow 2-3 business days for refills; controlled substances may take longer.   • Contact your pharmacy at least 5 days prior to running out of medication and have them send an electronic request or submit request through the [facility] recommended that you have a procedure or additional testing performed. [facility]) will contact your insurance carrier to obtain pre-certification or prior authorization.     Unfortunately, [facility] has seen an increase in denial of paym

## 2018-03-02 NOTE — PROGRESS NOTES
[facility] with [address]  7/2/1970  Primary Care Provider:  Juan Medina MD    3/2/2018    49year old yo patient being seen for:  Seizure and pseudo-seizures    In Tommy visit, the following items were reviewed: radiology results, and the following relevant information are as noted in appropriate sections above and below.       EXAM:  /74 (BP Location: Left arm, Patient Position: Sitting, Cuff Size: adult)   Pulse 80 Multiple Sclerosis Program  [facility]  3/2/2018, Time completed 9:29 AM    Decision making:  (  ) labs reviewed/ordered - 1  (  ) new diagnosis: - 1  (  ) Images & studies independently reviewed -non F2F  (  ) Case/studies discussed wi

## 2018-03-06 ENCOUNTER — APPOINTMENT (OUTPATIENT)
Dept: LAB | Age: 48
End: 2018-03-06
Attending: Other
Payer: COMMERCIAL

## 2018-03-06 ENCOUNTER — HOSPITAL ENCOUNTER (OUTPATIENT)
Dept: GENERAL RADIOLOGY | Age: 48
Discharge: HOME | End: 2018-03-06
Attending: Other
Payer: COMMERCIAL

## 2018-03-06 DIAGNOSIS — M79.671 PAIN OF RIGHT HEEL: ICD-10-CM

## 2018-03-06 LAB
RHEUMATOID FACT SERPL-ACNC: <10 IU/ML (ref ?–15)
URIC ACID: 4.8 MG/DL (ref 2.4–8)

## 2018-03-06 PROCEDURE — 73610 X-RAY EXAM OF ANKLE: CPT | Performed by: OTHER

## 2018-03-06 PROCEDURE — 84550 ASSAY OF BLOOD/URIC ACID: CPT | Performed by: OTHER

## 2018-03-06 PROCEDURE — 86431 RHEUMATOID FACTOR QUANT: CPT | Performed by: OTHER

## 2018-03-06 PROCEDURE — 86038 ANTINUCLEAR ANTIBODIES: CPT | Performed by: OTHER

## 2018-03-07 LAB — ANA SCREEN: NEGATIVE

## 2018-03-19 ENCOUNTER — TELEPHONE (OUTPATIENT)
Dept: NEUROLOGY | Facility: CLINIC | Age: 48
End: 2018-03-19

## 2018-04-04 ENCOUNTER — TELEPHONE (OUTPATIENT)
Dept: NEUROLOGY | Facility: CLINIC | Age: 48
End: 2018-04-04

## 2018-04-04 NOTE — TELEPHONE ENCOUNTER
No improvement with MDP that was prescribed by podiatrist.   Pain is unchanged; of same character from prior reports (in right heel radiating to hamstring; also in low back; exacerbated by walking and sitting).      Looking for any recommendations on sympto

## 2018-04-08 ENCOUNTER — APPOINTMENT (OUTPATIENT)
Dept: GENERAL RADIOLOGY | Facility: HOSPITAL | Age: 48
End: 2018-04-08
Attending: EMERGENCY MEDICINE
Payer: COMMERCIAL

## 2018-04-08 ENCOUNTER — HOSPITAL ENCOUNTER (EMERGENCY)
Facility: HOSPITAL | Age: 48
Discharge: HOME | End: 2018-04-08
Attending: EMERGENCY MEDICINE
Payer: COMMERCIAL

## 2018-04-08 VITALS
TEMPERATURE: 98 F | HEIGHT: 66 IN | DIASTOLIC BLOOD PRESSURE: 72 MMHG | SYSTOLIC BLOOD PRESSURE: 112 MMHG | BODY MASS INDEX: 27.32 KG/M2 | WEIGHT: 170 LBS | RESPIRATION RATE: 18 BRPM | HEART RATE: 60 BPM | OXYGEN SATURATION: 100 %

## 2018-04-08 DIAGNOSIS — M54.31 SCIATICA OF RIGHT SIDE: Primary | ICD-10-CM

## 2018-04-08 PROCEDURE — 99283 EMERGENCY DEPT VISIT LOW MDM: CPT

## 2018-04-08 PROCEDURE — 81003 URINALYSIS AUTO W/O SCOPE: CPT | Performed by: EMERGENCY MEDICINE

## 2018-04-08 PROCEDURE — 72100 X-RAY EXAM L-S SPINE 2/3 VWS: CPT | Performed by: EMERGENCY MEDICINE

## 2018-04-08 RX ORDER — CYCLOBENZAPRINE HCL 10 MG
10 TABLET ORAL 3 TIMES DAILY PRN
Qty: 20 TABLET | Refills: 0 | Status: SHIPPED | OUTPATIENT
Start: 2018-04-08 | End: 2018-10-03

## 2018-04-08 RX ORDER — METHYLPREDNISOLONE 4 MG/1
TABLET ORAL
Qty: 1 PACKAGE | Refills: 0 | Status: SHIPPED | OUTPATIENT
Start: 2018-04-08 | End: 2018-04-13

## 2018-04-08 RX ORDER — NAPROXEN 500 MG/1
500 TABLET ORAL 2 TIMES DAILY PRN
Qty: 20 TABLET | Refills: 0 | Status: SHIPPED | OUTPATIENT
Start: 2018-04-08 | End: 2018-10-03

## 2018-04-08 RX ORDER — TRAMADOL HYDROCHLORIDE 50 MG/1
TABLET ORAL EVERY 4 HOURS PRN
Qty: 20 TABLET | Refills: 0 | Status: SHIPPED | OUTPATIENT
Start: 2018-04-08 | End: 2018-04-15

## 2018-04-08 RX ORDER — HYDROCODONE BITARTRATE AND ACETAMINOPHEN 5; 325 MG/1; MG/1
2 TABLET ORAL ONCE
Status: COMPLETED | OUTPATIENT
Start: 2018-04-08 | End: 2018-04-08

## 2018-04-08 NOTE — ED INITIAL ASSESSMENT (HPI)
Pt c/o right outer foot pain that radiates into her back - sensitive to touch and pt reports unbearable to walk or lift       Denies injury

## 2018-04-08 NOTE — ED PROVIDER NOTES
Patient Seen in: [facility] Emergency Department    History   Patient presents with:  Lower Extremity Injury (musculoskeletal)  Back Pain (musculoskeletal)    Stated Complaint: R LEG/BACK PAIN    HPI    41-year-old female comes the hospital complain reviewed. All other systems reviewed and negative except as noted above.     Physical Exam   ED Triage Vitals [04/08/18 9026]  BP: 123/80  Pulse: 80  Resp: 16  Temp: 97.7 °F (36.5 °C)  Temp src: Temporal  SpO2: 100 %  O2 Device: None (Room air)    Curr arthropathy. CONCLUSION:  Mild degenerative changes within the lumbar spine. No acute displaced osseous fracture.     Dictated by: Chula Conroy MD on 4/08/2018 at 19:34     Approved by: Chula Conroy MD          Medications   HYDROcodone-acet

## 2018-04-23 RX ORDER — SUMATRIPTAN 100 MG/1
TABLET, FILM COATED ORAL
Qty: 9 TABLET | Refills: 2 | Status: SHIPPED | OUTPATIENT
Start: 2018-04-23 | End: 2018-08-29

## 2018-04-23 NOTE — TELEPHONE ENCOUNTER
Medication: Sumatriptan 100 mg    Date of last refill:08/21/17 with 5 addt refills  [place]     Last office visit: 3/2/2018  Due back to clinic per last office note:  RTN in 3 months  Date next office visit scheduled:  F (  ) Telephone time with patiern or authorized Carlene Sorensen F2F  (  ) other records reviewed --non F2F  (  ) Fam meetings - patient not present --non F2F  Non Face to Face CPT code 32591/74108 applies as documented above     PROCEDURE DONE     (   ) see

## 2018-04-30 ENCOUNTER — TELEPHONE (OUTPATIENT)
Dept: NEUROLOGY | Facility: CLINIC | Age: 48
End: 2018-04-30

## 2018-04-30 DIAGNOSIS — G40.909 SEIZURE DISORDER (HCC): Primary | ICD-10-CM

## 2018-04-30 RX ORDER — LEVETIRACETAM 500 MG/1
TABLET ORAL
Qty: 90 TABLET | Refills: 5 | Status: SHIPPED | OUTPATIENT
Start: 2018-04-30 | End: 2018-10-26

## 2018-04-30 NOTE — TELEPHONE ENCOUNTER
S: Calling with seizure    B: Last seen 3/2/18 with notes:  Discussion on the case:  She has had bad reactions to gabapentin in the past and refuses to try it.   Stable seizures and migraines      A: Lasting less than a minute but she experienced 3 one afte

## 2018-05-14 ENCOUNTER — TELEPHONE (OUTPATIENT)
Dept: NEUROLOGY | Facility: CLINIC | Age: 48
End: 2018-05-14

## 2018-05-15 ENCOUNTER — OFFICE VISIT (OUTPATIENT)
Dept: NEUROLOGY | Facility: CLINIC | Age: 48
End: 2018-05-15

## 2018-05-15 VITALS
BODY MASS INDEX: 28.13 KG/M2 | SYSTOLIC BLOOD PRESSURE: 100 MMHG | RESPIRATION RATE: 16 BRPM | HEIGHT: 66 IN | HEART RATE: 74 BPM | DIASTOLIC BLOOD PRESSURE: 62 MMHG | WEIGHT: 175 LBS

## 2018-05-15 DIAGNOSIS — M79.609 PARESTHESIA AND PAIN OF EXTREMITY: Primary | ICD-10-CM

## 2018-05-15 DIAGNOSIS — G43.001 MIGRAINE WITHOUT AURA AND WITH STATUS MIGRAINOSUS, NOT INTRACTABLE: ICD-10-CM

## 2018-05-15 DIAGNOSIS — R20.2 PARESTHESIA AND PAIN OF EXTREMITY: Primary | ICD-10-CM

## 2018-05-15 DIAGNOSIS — I73.00 RAYNAUD'S PHENOMENON WITHOUT GANGRENE: ICD-10-CM

## 2018-05-15 DIAGNOSIS — M77.51 TENDINITIS OF RIGHT ANKLE: ICD-10-CM

## 2018-05-15 DIAGNOSIS — G40.909 SEIZURE CEREBRAL (HCC): ICD-10-CM

## 2018-05-15 PROCEDURE — 99214 OFFICE O/P EST MOD 30 MIN: CPT | Performed by: OTHER

## 2018-05-15 RX ORDER — NABUMETONE 750 MG/1
750 TABLET, FILM COATED ORAL 2 TIMES DAILY
Qty: 60 TABLET | Refills: 0 | Status: SHIPPED | OUTPATIENT
Start: 2018-05-15 | End: 2018-10-03

## 2018-05-15 NOTE — PROGRESS NOTES
[facility] with [address]  7/2/1970  Primary Care Provider:  Gustavo Moreno MD    5/15/2018    49year old yo patient being seen for:  Leg pain     Intermittent tingling i reviewed: medications, and the following relevant information are as noted in appropriate sections above and below.       EXAM:  /62 (BP Location: Left arm, Patient Position: Sitting, Cuff Size: adult)   Pulse 74   Resp 16   Ht 66\"   Wt 175 lb   BMI new diagnosis: - 1  (  ) Images & studies independently reviewed -non F2F  (  ) Case/studies discussed with other caregivers - -non F2F  (  ) Telephone time with patiern or authorized [employer]  (  ) other records reviewed --non F2F  (  ) Topher mcconnell

## 2018-05-15 NOTE — PATIENT INSTRUCTIONS
Refill policies:    • Allow 2-3 business days for refills; controlled substances may take longer.   • Contact your pharmacy at least 5 days prior to running out of medication and have them send an electronic request or submit request through the “request re entire amount billed. Precertification and Prior Authorizations: If your physician has recommended that you have a procedure or additional testing performed.   [facility]) will contact your insurance carrier to obtain pre-certi

## 2018-05-21 RX ORDER — LEVETIRACETAM 250 MG/1
TABLET ORAL
Qty: 120 TABLET | Refills: 0 | OUTPATIENT
Start: 2018-05-21

## 2018-05-21 NOTE — TELEPHONE ENCOUNTER
Medication: Keppra 500 mg    Date of last refill: 04/30/18 with 5 addt refills  [place]     Last office visit: 5/15/2018  Due back to clinic per last office note:  RTN in 6 months  Date next office visit scheduled:   [place]

## 2018-06-08 ENCOUNTER — TELEPHONE (OUTPATIENT)
Dept: NEUROLOGY | Facility: CLINIC | Age: 48
End: 2018-06-08

## 2018-07-03 ENCOUNTER — HOSPITAL ENCOUNTER (OUTPATIENT)
Dept: MRI IMAGING | Age: 48
Discharge: HOME | End: 2018-07-03
Attending: SPECIALIST
Payer: COMMERCIAL

## 2018-07-03 DIAGNOSIS — M79.671 RIGHT FOOT PAIN: ICD-10-CM

## 2018-07-03 DIAGNOSIS — M66.871 NONTRAUMATIC RUPTURE OF POSTERIOR TIBIAL TENDON, RIGHT: ICD-10-CM

## 2018-07-03 DIAGNOSIS — R26.2 DIFFICULTY WALKING: ICD-10-CM

## 2018-07-03 DIAGNOSIS — R60.0 LOCALIZED EDEMA: ICD-10-CM

## 2018-07-03 PROCEDURE — 73721 MRI JNT OF LWR EXTRE W/O DYE: CPT | Performed by: SPECIALIST

## 2018-07-03 PROCEDURE — 73718 MRI LOWER EXTREMITY W/O DYE: CPT | Performed by: SPECIALIST

## 2018-08-29 RX ORDER — SUMATRIPTAN 100 MG/1
TABLET, FILM COATED ORAL
Qty: 9 TABLET | Refills: 5 | Status: SHIPPED | OUTPATIENT
Start: 2018-08-29 | End: 2019-10-23

## 2018-08-29 NOTE — TELEPHONE ENCOUNTER
Medication: Sumatriptan 100 mg     Date of last refill: 04/23/18 with 2 addt refills  [place]      Last office visit: 5/15/2018  Due back to clinic per last office note:  RTN in 6 months  Date next office visit schedu

## 2018-08-30 DIAGNOSIS — G43.909 MIGRAINE WITHOUT STATUS MIGRAINOSUS, NOT INTRACTABLE, UNSPECIFIED MIGRAINE TYPE: Primary | ICD-10-CM

## 2018-08-30 NOTE — TELEPHONE ENCOUNTER
S: Calling with migraine for past 2 weeks    B: On Topamax 50 mg QHS, Keppra for seizures, using sumatriptan for abortive purposes    A: Sumatriptan hasn't worked most recently. She notes auras in her right eye.    R: Willing to try MDP if appropriate.  Wi

## 2018-08-31 RX ORDER — METHYLPREDNISOLONE 4 MG/1
TABLET ORAL
Qty: 1 KIT | Refills: 0 | Status: SHIPPED | OUTPATIENT
Start: 2018-08-31 | End: 2018-10-03 | Stop reason: ALTCHOICE

## 2018-10-03 ENCOUNTER — OFFICE VISIT (OUTPATIENT)
Dept: NEUROLOGY | Facility: CLINIC | Age: 48
End: 2018-10-03
Payer: COMMERCIAL

## 2018-10-03 VITALS
DIASTOLIC BLOOD PRESSURE: 60 MMHG | WEIGHT: 175 LBS | HEART RATE: 69 BPM | HEIGHT: 66 IN | RESPIRATION RATE: 16 BRPM | BODY MASS INDEX: 28.13 KG/M2 | SYSTOLIC BLOOD PRESSURE: 102 MMHG

## 2018-10-03 DIAGNOSIS — R55 SYNCOPE, UNSPECIFIED SYNCOPE TYPE: ICD-10-CM

## 2018-10-03 DIAGNOSIS — R56.9 PSEUDOSEIZURES (HCC): ICD-10-CM

## 2018-10-03 DIAGNOSIS — G43.009 MIGRAINE WITHOUT AURA AND WITHOUT STATUS MIGRAINOSUS, NOT INTRACTABLE: Primary | ICD-10-CM

## 2018-10-03 PROCEDURE — 99214 OFFICE O/P EST MOD 30 MIN: CPT | Performed by: OTHER

## 2018-10-03 RX ORDER — PYRIDOSTIGMINE BROMIDE 60 MG/1
60 TABLET ORAL 4 TIMES DAILY
COMMUNITY
End: 2020-02-24

## 2018-10-03 NOTE — PATIENT INSTRUCTIONS
Refill policies:    • Allow 2-3 business days for refills; controlled substances may take longer.   • Contact your pharmacy at least 5 days prior to running out of medication and have them send an electronic request or submit request through the “request re entire amount billed. Precertification and Prior Authorizations: If your physician has recommended that you have a procedure or additional testing performed.   [facility]) will contact your insurance carrier to obtain pre-certi

## 2018-10-03 NOTE — PROGRESS NOTES
[facility] with [address]  7/2/1970  Primary Care Provider:  Jennifer Kilgore MD    10/3/2018  Accompanied visit:  (x) No ( ) yes    51year old yo patient being seen for: the following relevant information are as noted in appropriate sections above and below.       EXAM:  /60 (BP Location: Left arm, Patient Position: Sitting, Cuff Size: adult)   Pulse 69   Resp 16   Ht 66\"   Wt 175 lb   BMI 28.25 kg/m²   Looks stated test results, follow up will be readjusted        Zenia Dowling MD  Vascular & General Neurology  Director, Multiple Sclerosis Program  [facility]  10/3/2018, Time completed 10:02 AM    Decision making:  (  ) labs reviewed/ordered - 1

## 2018-10-24 ENCOUNTER — HOSPITAL ENCOUNTER (OUTPATIENT)
Dept: GENERAL RADIOLOGY | Facility: HOSPITAL | Age: 48
Discharge: HOME | End: 2018-10-24
Attending: SPECIALIST
Payer: COMMERCIAL

## 2018-10-24 DIAGNOSIS — R07.81 RIB PAIN: ICD-10-CM

## 2018-10-24 PROCEDURE — 71046 X-RAY EXAM CHEST 2 VIEWS: CPT | Performed by: SPECIALIST

## 2018-10-24 PROCEDURE — 71100 X-RAY EXAM RIBS UNI 2 VIEWS: CPT | Performed by: SPECIALIST

## 2018-10-29 RX ORDER — LEVETIRACETAM 500 MG/1
TABLET ORAL
Qty: 90 TABLET | Refills: 1 | Status: SHIPPED | OUTPATIENT
Start: 2018-10-29 | End: 2019-01-03

## 2018-10-29 NOTE — TELEPHONE ENCOUNTER
Medication: Levetiracetam 500 mg    Date of last refill: 04/30/18 with 5 addt refills  [place]     Last office visit: 10/3/2018  Due back to clinic per last office note:  RTN in 3 months  Date next office visit scheduled (  ) Fam meetings - patient not present --non F2F  Non Face to Face CPT code 89610/07281 applies as documented above     PROCEDURE DONE     (   ) see notes  Visits are done with \"open doors and windows\" exam rooms, except when patient requests privacy

## 2018-11-23 ENCOUNTER — IMAGING SERVICES (OUTPATIENT)
Dept: OTHER | Age: 48
End: 2018-11-23

## 2018-11-28 VITALS
RESPIRATION RATE: 16 BRPM | WEIGHT: 188 LBS | BODY MASS INDEX: 30.22 KG/M2 | DIASTOLIC BLOOD PRESSURE: 72 MMHG | HEART RATE: 92 BPM | HEIGHT: 66 IN | SYSTOLIC BLOOD PRESSURE: 188 MMHG

## 2019-01-04 RX ORDER — LEVETIRACETAM 500 MG/1
TABLET ORAL
Qty: 90 TABLET | Refills: 5 | Status: SHIPPED | OUTPATIENT
Start: 2019-01-04 | End: 2019-10-21

## 2019-01-04 NOTE — TELEPHONE ENCOUNTER
Medication: Levetiracetam 500 mg     Date of last refill: 10/29/18 with 1 addt refill  [place]      Last office visit: 10/3/2018  Due back to clinic per last office note:  RTN in 3 months  Date next office visit schedule (  ) Fam meetings - patient not present --non F2F  Non Face to Face CPT code 83628/56473 applies as documented above     PROCEDURE DONE     (   ) see notes  Visits are done with \"open doors and windows\" exam rooms, except when patient requests privacy

## 2019-01-07 NOTE — TELEPHONE ENCOUNTER
Phoned in rx to [address] [facility]. [facility] having Keppra backordered. Patient states that she is not feeling well and advise her to go to ER.  Verbalized understanding

## 2019-01-10 ENCOUNTER — TELEPHONE (OUTPATIENT)
Dept: NEUROLOGY | Facility: CLINIC | Age: 49
End: 2019-01-10

## 2019-01-10 ENCOUNTER — APPOINTMENT (OUTPATIENT)
Dept: LAB | Age: 49
End: 2019-01-10
Attending: Other
Payer: COMMERCIAL

## 2019-01-10 ENCOUNTER — OFFICE VISIT (OUTPATIENT)
Dept: NEUROLOGY | Facility: CLINIC | Age: 49
End: 2019-01-10
Payer: COMMERCIAL

## 2019-01-10 VITALS
BODY MASS INDEX: 28.13 KG/M2 | SYSTOLIC BLOOD PRESSURE: 105 MMHG | WEIGHT: 175 LBS | DIASTOLIC BLOOD PRESSURE: 60 MMHG | RESPIRATION RATE: 16 BRPM | HEIGHT: 66 IN | HEART RATE: 74 BPM

## 2019-01-10 DIAGNOSIS — G40.209 COMPLEX PARTIAL SEIZURE EVOLVING TO GENERALIZED SEIZURE (HCC): Primary | ICD-10-CM

## 2019-01-10 DIAGNOSIS — G43.109 MIGRAINE WITH AURA AND WITHOUT STATUS MIGRAINOSUS, NOT INTRACTABLE: ICD-10-CM

## 2019-01-10 DIAGNOSIS — F44.5 PSEUDOSEIZURE: ICD-10-CM

## 2019-01-10 LAB
ALBUMIN SERPL-MCNC: 3.6 G/DL (ref 3.1–4.5)
ALBUMIN/GLOB SERPL: 1.1 {RATIO} (ref 1–2)
ALP LIVER SERPL-CCNC: 73 U/L (ref 39–100)
ALT SERPL-CCNC: 20 U/L (ref 14–54)
ANION GAP SERPL CALC-SCNC: 8 MMOL/L (ref 0–18)
AST SERPL-CCNC: 13 U/L (ref 15–41)
BASOPHILS # BLD AUTO: 0.03 X10(3) UL (ref 0–0.1)
BASOPHILS NFR BLD AUTO: 0.7 %
BILIRUB SERPL-MCNC: 0.3 MG/DL (ref 0.1–2)
BUN BLD-MCNC: 18 MG/DL (ref 8–20)
BUN/CREAT SERPL: 20.2 (ref 10–20)
CALCIUM BLD-MCNC: 8.9 MG/DL (ref 8.3–10.3)
CHLORIDE SERPL-SCNC: 114 MMOL/L (ref 101–111)
CO2 SERPL-SCNC: 21 MMOL/L (ref 22–32)
CREAT BLD-MCNC: 0.89 MG/DL (ref 0.55–1.02)
EOSINOPHIL # BLD AUTO: 0.13 X10(3) UL (ref 0–0.3)
EOSINOPHIL NFR BLD AUTO: 3.1 %
ERYTHROCYTE [DISTWIDTH] IN BLOOD BY AUTOMATED COUNT: 12.9 % (ref 11.5–16)
GLOBULIN PLAS-MCNC: 3.2 G/DL (ref 2.8–4.4)
GLUCOSE BLD-MCNC: 102 MG/DL (ref 70–99)
HCT VFR BLD AUTO: 41.2 % (ref 34–50)
HGB BLD-MCNC: 13.1 G/DL (ref 12–16)
IMMATURE GRANULOCYTE COUNT: 0.01 X10(3) UL (ref 0–1)
IMMATURE GRANULOCYTE RATIO %: 0.2 %
LYMPHOCYTES # BLD AUTO: 1.54 X10(3) UL (ref 0.9–4)
LYMPHOCYTES NFR BLD AUTO: 37 %
M PROTEIN MFR SERPL ELPH: 6.8 G/DL (ref 6.4–8.2)
MCH RBC QN AUTO: 29 PG (ref 27–33.2)
MCHC RBC AUTO-ENTMCNC: 31.8 G/DL (ref 31–37)
MCV RBC AUTO: 91.4 FL (ref 81–100)
MONOCYTES # BLD AUTO: 0.33 X10(3) UL (ref 0.1–1)
MONOCYTES NFR BLD AUTO: 7.9 %
NEUTROPHIL ABS PRELIM: 2.12 X10 (3) UL (ref 1.3–6.7)
NEUTROPHILS # BLD AUTO: 2.12 X10(3) UL (ref 1.3–6.7)
NEUTROPHILS NFR BLD AUTO: 51.1 %
OSMOLALITY SERPL CALC.SUM OF ELEC: 298 MOSM/KG (ref 275–295)
PLATELET # BLD AUTO: 260 10(3)UL (ref 150–450)
POTASSIUM SERPL-SCNC: 3.8 MMOL/L (ref 3.6–5.1)
RBC # BLD AUTO: 4.51 X10(6)UL (ref 3.8–5.1)
RED CELL DISTRIBUTION WIDTH-SD: 42.1 FL (ref 35.1–46.3)
SODIUM SERPL-SCNC: 143 MMOL/L (ref 136–144)
WBC # BLD AUTO: 4.2 X10(3) UL (ref 4–13)

## 2019-01-10 PROCEDURE — 80177 DRUG SCRN QUAN LEVETIRACETAM: CPT | Performed by: OTHER

## 2019-01-10 PROCEDURE — 99214 OFFICE O/P EST MOD 30 MIN: CPT | Performed by: OTHER

## 2019-01-10 RX ORDER — RANOLAZINE 500 MG/1
500 TABLET, EXTENDED RELEASE ORAL 2 TIMES DAILY
COMMUNITY
End: 2019-03-19

## 2019-01-10 NOTE — PROGRESS NOTES
[facility] with [address]  7/2/1970  Primary Care Provider:  Daryl Newton MD    1/10/2019  Accompanied visit:  (x) No ( ) yes, by:      51year old yo patient being seen DAILY AS NEEDED FOR MIGRAINE., Disp: 9 tablet, Rfl: 5  •  Fluticasone Furoate-Vilanterol (BREO ELLIPTA) 100-25 MCG/INH Inhalation Aerosol Powder, Breath Activated, Inhale 1 puff into the lungs as needed.   , Disp: , Rfl:   •  topiramate 50 MG Oral Tab, Take ) 6-8 months   ( ) yearly  With Mandy Sandoval  ( ) As needed but knows to call if there are problems  ( ) Followup for special test  /or keep scheduled appointment  Patient understands that if needed, based on condition and or test results, follow up will be readj

## 2019-01-10 NOTE — PATIENT INSTRUCTIONS
Refill policies:    • Allow 2-3 business days for refills; controlled substances may take longer.   • Contact your pharmacy at least 5 days prior to running out of medication and have them send an electronic request or submit request through the “request re entire amount billed. Precertification and Prior Authorizations: If your physician has recommended that you have a procedure or additional testing performed.   [facility]) will contact your insurance carrier to obtain pre-certi

## 2019-01-11 ENCOUNTER — HOSPITAL ENCOUNTER (EMERGENCY)
Facility: HOSPITAL | Age: 49
Discharge: HOME | End: 2019-01-11
Attending: EMERGENCY MEDICINE
Payer: COMMERCIAL

## 2019-01-11 VITALS
WEIGHT: 175.06 LBS | BODY MASS INDEX: 28 KG/M2 | RESPIRATION RATE: 15 BRPM | DIASTOLIC BLOOD PRESSURE: 60 MMHG | TEMPERATURE: 98 F | HEART RATE: 76 BPM | SYSTOLIC BLOOD PRESSURE: 97 MMHG | OXYGEN SATURATION: 95 %

## 2019-01-11 DIAGNOSIS — G40.909 RECURRENT SEIZURES (HCC): Primary | ICD-10-CM

## 2019-01-11 LAB
ALBUMIN SERPL-MCNC: 3.6 G/DL (ref 3.1–4.5)
ALBUMIN/GLOB SERPL: 1.2 {RATIO} (ref 1–2)
ALP LIVER SERPL-CCNC: 76 U/L (ref 39–100)
ALT SERPL-CCNC: 21 U/L (ref 14–54)
ANION GAP SERPL CALC-SCNC: 5 MMOL/L (ref 0–18)
AST SERPL-CCNC: 21 U/L (ref 15–41)
BASOPHILS # BLD AUTO: 0.05 X10(3) UL (ref 0–0.1)
BASOPHILS NFR BLD AUTO: 0.9 %
BILIRUB SERPL-MCNC: 0.4 MG/DL (ref 0.1–2)
BILIRUB UR QL STRIP.AUTO: NEGATIVE
BUN BLD-MCNC: 21 MG/DL (ref 8–20)
BUN/CREAT SERPL: 25.3 (ref 10–20)
CALCIUM BLD-MCNC: 8.6 MG/DL (ref 8.3–10.3)
CHLORIDE SERPL-SCNC: 116 MMOL/L (ref 101–111)
CLARITY UR REFRACT.AUTO: CLEAR
CO2 SERPL-SCNC: 25 MMOL/L (ref 22–32)
COLOR UR AUTO: YELLOW
CREAT BLD-MCNC: 0.83 MG/DL (ref 0.55–1.02)
EOSINOPHIL # BLD AUTO: 0.19 X10(3) UL (ref 0–0.3)
EOSINOPHIL NFR BLD AUTO: 3.4 %
ERYTHROCYTE [DISTWIDTH] IN BLOOD BY AUTOMATED COUNT: 12.7 % (ref 11.5–16)
GLOBULIN PLAS-MCNC: 3.1 G/DL (ref 2.8–4.4)
GLUCOSE BLD-MCNC: 115 MG/DL (ref 70–99)
GLUCOSE UR STRIP.AUTO-MCNC: NEGATIVE MG/DL
HCT VFR BLD AUTO: 39.4 % (ref 34–50)
HGB BLD-MCNC: 13.4 G/DL (ref 12–16)
IMMATURE GRANULOCYTE COUNT: 0.01 X10(3) UL (ref 0–1)
IMMATURE GRANULOCYTE RATIO %: 0.2 %
KETONES UR STRIP.AUTO-MCNC: NEGATIVE MG/DL
LYMPHOCYTES # BLD AUTO: 2.91 X10(3) UL (ref 0.9–4)
LYMPHOCYTES NFR BLD AUTO: 52 %
M PROTEIN MFR SERPL ELPH: 6.7 G/DL (ref 6.4–8.2)
MCH RBC QN AUTO: 29.5 PG (ref 27–33.2)
MCHC RBC AUTO-ENTMCNC: 34 G/DL (ref 31–37)
MCV RBC AUTO: 86.8 FL (ref 81–100)
MONOCYTES # BLD AUTO: 0.37 X10(3) UL (ref 0.1–1)
MONOCYTES NFR BLD AUTO: 6.6 %
NEUTROPHIL ABS PRELIM: 2.07 X10 (3) UL (ref 1.3–6.7)
NEUTROPHILS # BLD AUTO: 2.07 X10(3) UL (ref 1.3–6.7)
NEUTROPHILS NFR BLD AUTO: 36.9 %
NITRITE UR QL STRIP.AUTO: NEGATIVE
OSMOLALITY SERPL CALC.SUM OF ELEC: 306 MOSM/KG (ref 275–295)
PH UR STRIP.AUTO: 8 [PH] (ref 4.5–8)
PLATELET # BLD AUTO: 243 10(3)UL (ref 150–450)
POTASSIUM SERPL-SCNC: 3.7 MMOL/L (ref 3.6–5.1)
PROT UR STRIP.AUTO-MCNC: NEGATIVE MG/DL
RBC # BLD AUTO: 4.54 X10(6)UL (ref 3.8–5.1)
RBC UR QL AUTO: NEGATIVE
RED CELL DISTRIBUTION WIDTH-SD: 39.8 FL (ref 35.1–46.3)
SODIUM SERPL-SCNC: 146 MMOL/L (ref 136–144)
SP GR UR STRIP.AUTO: 1.01 (ref 1–1.03)
UROBILINOGEN UR STRIP.AUTO-MCNC: <2 MG/DL
WBC # BLD AUTO: 5.6 X10(3) UL (ref 4–13)

## 2019-01-11 PROCEDURE — 87086 URINE CULTURE/COLONY COUNT: CPT | Performed by: EMERGENCY MEDICINE

## 2019-01-11 PROCEDURE — 99284 EMERGENCY DEPT VISIT MOD MDM: CPT

## 2019-01-11 PROCEDURE — 80053 COMPREHEN METABOLIC PANEL: CPT | Performed by: EMERGENCY MEDICINE

## 2019-01-11 PROCEDURE — 85025 COMPLETE CBC W/AUTO DIFF WBC: CPT | Performed by: EMERGENCY MEDICINE

## 2019-01-11 PROCEDURE — 96365 THER/PROPH/DIAG IV INF INIT: CPT

## 2019-01-11 PROCEDURE — 81001 URINALYSIS AUTO W/SCOPE: CPT | Performed by: EMERGENCY MEDICINE

## 2019-01-11 RX ORDER — IBUPROFEN 600 MG/1
600 TABLET ORAL ONCE
Status: COMPLETED | OUTPATIENT
Start: 2019-01-11 | End: 2019-01-11

## 2019-01-11 NOTE — ED PROVIDER NOTES
Patient Seen in: [facility] Emergency Department    History   Patient presents with:  Seizure Disorder (neurologic)  Headache (neurologic)    Stated Complaint: seizure-pt reports 5 seizures today since 1700, also c/o severe headache     FERNIE Issa reviewed and negative except as noted above.     Physical Exam     ED Triage Vitals [01/11/19 0047]   /70   Pulse 109   Resp 20   Temp 98.1 °F (36.7 °C)   Temp src Temporal   SpO2 99 %   O2 Device None (Room air)       Current:BP 97/60   Pulse 76   Te Abnormality         Status                     ---------                               -----------         ------                     CBC W/ DIFFERENTIAL[[id number]]                              Final result                 Please view results for these julia

## 2019-01-12 LAB — LEVETIRACETAM (KEPPRA): 22 UG/ML

## 2019-01-24 ENCOUNTER — IMAGING SERVICES (OUTPATIENT)
Dept: OTHER | Age: 49
End: 2019-01-24

## 2019-02-25 ENCOUNTER — LAB ENCOUNTER (OUTPATIENT)
Dept: LAB | Facility: HOSPITAL | Age: 49
End: 2019-02-25
Attending: SPECIALIST
Payer: COMMERCIAL

## 2019-02-25 DIAGNOSIS — M18.0 PRIMARY OSTEOARTHRITIS OF BOTH FIRST CARPOMETACARPAL JOINTS: Primary | ICD-10-CM

## 2019-02-25 DIAGNOSIS — R51.9 HEADACHE DISORDER: ICD-10-CM

## 2019-02-25 LAB
ALBUMIN SERPL-MCNC: 4.2 G/DL (ref 3.4–5)
ALBUMIN/GLOB SERPL: 1.3 {RATIO} (ref 1–2)
ALP LIVER SERPL-CCNC: 85 U/L (ref 39–100)
ALT SERPL-CCNC: 23 U/L (ref 13–56)
ANION GAP SERPL CALC-SCNC: 7 MMOL/L (ref 0–18)
AST SERPL-CCNC: 18 U/L (ref 15–37)
BILIRUB SERPL-MCNC: 0.3 MG/DL (ref 0.1–2)
BUN BLD-MCNC: 20 MG/DL (ref 7–18)
BUN/CREAT SERPL: 31.7 (ref 10–20)
CALCIUM BLD-MCNC: 9.3 MG/DL (ref 8.5–10.1)
CHLORIDE SERPL-SCNC: 109 MMOL/L (ref 98–107)
CO2 SERPL-SCNC: 27 MMOL/L (ref 21–32)
CREAT BLD-MCNC: 0.63 MG/DL (ref 0.55–1.02)
GLOBULIN PLAS-MCNC: 3.3 G/DL (ref 2.8–4.4)
GLUCOSE BLD-MCNC: 69 MG/DL (ref 70–99)
M PROTEIN MFR SERPL ELPH: 7.5 G/DL (ref 6.4–8.2)
OSMOLALITY SERPL CALC.SUM OF ELEC: 297 MOSM/KG (ref 275–295)
POTASSIUM SERPL-SCNC: 3.4 MMOL/L (ref 3.5–5.1)
SED RATE-ML: 6 MM/HR (ref 0–25)
SODIUM SERPL-SCNC: 143 MMOL/L (ref 136–145)
T4 FREE SERPL-MCNC: 1.1 NG/DL (ref 0.8–1.7)
TSI SER-ACNC: 1.73 MIU/ML (ref 0.36–3.74)

## 2019-02-25 PROCEDURE — 84439 ASSAY OF FREE THYROXINE: CPT

## 2019-02-25 PROCEDURE — 83519 RIA NONANTIBODY: CPT

## 2019-02-25 PROCEDURE — 84443 ASSAY THYROID STIM HORMONE: CPT

## 2019-02-25 PROCEDURE — 85652 RBC SED RATE AUTOMATED: CPT

## 2019-02-25 PROCEDURE — 36415 COLL VENOUS BLD VENIPUNCTURE: CPT

## 2019-02-25 PROCEDURE — 84238 ASSAY NONENDOCRINE RECEPTOR: CPT

## 2019-02-25 PROCEDURE — 80053 COMPREHEN METABOLIC PANEL: CPT

## 2019-02-27 LAB
ACETYLCHOLINE BINDING AB: 0 NMOL/L
ACETYLCHOLINE BLOCKING AB: 14 %

## 2019-03-15 ENCOUNTER — HOSPITAL ENCOUNTER (EMERGENCY)
Facility: HOSPITAL | Age: 49
Discharge: HOME | End: 2019-03-16
Attending: EMERGENCY MEDICINE
Payer: COMMERCIAL

## 2019-03-15 ENCOUNTER — APPOINTMENT (OUTPATIENT)
Dept: CT IMAGING | Facility: HOSPITAL | Age: 49
End: 2019-03-15
Attending: EMERGENCY MEDICINE
Payer: COMMERCIAL

## 2019-03-15 DIAGNOSIS — R51.9 HEADACHE IN BACK OF HEAD: Primary | ICD-10-CM

## 2019-03-15 DIAGNOSIS — R55 SYNCOPE, NEAR: ICD-10-CM

## 2019-03-15 LAB
ALBUMIN SERPL-MCNC: 3.9 G/DL (ref 3.4–5)
ALBUMIN/GLOB SERPL: 1.2 {RATIO} (ref 1–2)
ALP LIVER SERPL-CCNC: 83 U/L (ref 39–100)
ALT SERPL-CCNC: 24 U/L (ref 13–56)
ANION GAP SERPL CALC-SCNC: 6 MMOL/L (ref 0–18)
AST SERPL-CCNC: 11 U/L (ref 15–37)
BASOPHILS # BLD AUTO: 0.04 X10(3) UL (ref 0–0.2)
BASOPHILS NFR BLD AUTO: 0.4 %
BILIRUB SERPL-MCNC: 0.3 MG/DL (ref 0.1–2)
BILIRUB UR QL STRIP.AUTO: NEGATIVE
BUN BLD-MCNC: 20 MG/DL (ref 7–18)
BUN/CREAT SERPL: 31.7 (ref 10–20)
CALCIUM BLD-MCNC: 9.4 MG/DL (ref 8.5–10.1)
CHLORIDE SERPL-SCNC: 109 MMOL/L (ref 98–107)
CLARITY UR REFRACT.AUTO: CLEAR
CO2 SERPL-SCNC: 27 MMOL/L (ref 21–32)
COLOR UR AUTO: COLORLESS
CREAT BLD-MCNC: 0.63 MG/DL (ref 0.55–1.02)
DEPRECATED RDW RBC AUTO: 44.3 FL (ref 35.1–46.3)
EOSINOPHIL # BLD AUTO: 0.07 X10(3) UL (ref 0–0.7)
EOSINOPHIL NFR BLD AUTO: 0.7 %
ERYTHROCYTE [DISTWIDTH] IN BLOOD BY AUTOMATED COUNT: 13.7 % (ref 11–15)
GLOBULIN PLAS-MCNC: 3.3 G/DL (ref 2.8–4.4)
GLUCOSE BLD-MCNC: 91 MG/DL (ref 70–99)
GLUCOSE UR STRIP.AUTO-MCNC: NEGATIVE MG/DL
HCT VFR BLD AUTO: 42.3 % (ref 35–48)
HGB BLD-MCNC: 14.2 G/DL (ref 12–16)
IMM GRANULOCYTES # BLD AUTO: 0.06 X10(3) UL (ref 0–1)
IMM GRANULOCYTES NFR BLD: 0.6 %
KETONES UR STRIP.AUTO-MCNC: NEGATIVE MG/DL
LEUKOCYTE ESTERASE UR QL STRIP.AUTO: NEGATIVE
LYMPHOCYTES # BLD AUTO: 2.56 X10(3) UL (ref 1–4)
LYMPHOCYTES NFR BLD AUTO: 26.6 %
M PROTEIN MFR SERPL ELPH: 7.2 G/DL (ref 6.4–8.2)
MCH RBC QN AUTO: 30 PG (ref 26–34)
MCHC RBC AUTO-ENTMCNC: 33.6 G/DL (ref 31–37)
MCV RBC AUTO: 89.4 FL (ref 80–100)
MONOCYTES # BLD AUTO: 0.44 X10(3) UL (ref 0.1–1)
MONOCYTES NFR BLD AUTO: 4.6 %
NEUTROPHILS # BLD AUTO: 6.45 X10 (3) UL (ref 1.5–7.7)
NEUTROPHILS # BLD AUTO: 6.45 X10(3) UL (ref 1.5–7.7)
NEUTROPHILS NFR BLD AUTO: 67.1 %
NITRITE UR QL STRIP.AUTO: NEGATIVE
OSMOLALITY SERPL CALC.SUM OF ELEC: 296 MOSM/KG (ref 275–295)
PH UR STRIP.AUTO: 7 [PH] (ref 4.5–8)
PLATELET # BLD AUTO: 237 10(3)UL (ref 150–450)
POTASSIUM SERPL-SCNC: 3.3 MMOL/L (ref 3.5–5.1)
PROT UR STRIP.AUTO-MCNC: NEGATIVE MG/DL
RBC # BLD AUTO: 4.73 X10(6)UL (ref 3.8–5.3)
RBC UR QL AUTO: NEGATIVE
SODIUM SERPL-SCNC: 142 MMOL/L (ref 136–145)
SP GR UR STRIP.AUTO: 1.01 (ref 1–1.03)
UROBILINOGEN UR STRIP.AUTO-MCNC: <2 MG/DL
WBC # BLD AUTO: 9.6 X10(3) UL (ref 4–11)

## 2019-03-15 PROCEDURE — 99285 EMERGENCY DEPT VISIT HI MDM: CPT

## 2019-03-15 PROCEDURE — 96376 TX/PRO/DX INJ SAME DRUG ADON: CPT

## 2019-03-15 PROCEDURE — 93005 ELECTROCARDIOGRAM TRACING: CPT

## 2019-03-15 PROCEDURE — 85025 COMPLETE CBC W/AUTO DIFF WBC: CPT | Performed by: EMERGENCY MEDICINE

## 2019-03-15 PROCEDURE — 70450 CT HEAD/BRAIN W/O DYE: CPT | Performed by: EMERGENCY MEDICINE

## 2019-03-15 PROCEDURE — 81003 URINALYSIS AUTO W/O SCOPE: CPT | Performed by: EMERGENCY MEDICINE

## 2019-03-15 PROCEDURE — 72125 CT NECK SPINE W/O DYE: CPT | Performed by: EMERGENCY MEDICINE

## 2019-03-15 PROCEDURE — 96375 TX/PRO/DX INJ NEW DRUG ADDON: CPT

## 2019-03-15 PROCEDURE — 96374 THER/PROPH/DIAG INJ IV PUSH: CPT

## 2019-03-15 PROCEDURE — 80053 COMPREHEN METABOLIC PANEL: CPT | Performed by: EMERGENCY MEDICINE

## 2019-03-15 PROCEDURE — 93010 ELECTROCARDIOGRAM REPORT: CPT

## 2019-03-15 PROCEDURE — 96361 HYDRATE IV INFUSION ADD-ON: CPT

## 2019-03-15 RX ORDER — ONDANSETRON 2 MG/ML
4 INJECTION INTRAMUSCULAR; INTRAVENOUS ONCE
Status: COMPLETED | OUTPATIENT
Start: 2019-03-15 | End: 2019-03-15

## 2019-03-15 RX ORDER — MORPHINE SULFATE 4 MG/ML
4 INJECTION, SOLUTION INTRAMUSCULAR; INTRAVENOUS ONCE
Status: COMPLETED | OUTPATIENT
Start: 2019-03-15 | End: 2019-03-15

## 2019-03-15 NOTE — ED INITIAL ASSESSMENT (HPI)
Pt states had a syncopal episode at work today. Pt c/o pain to the right side of her head with numbness since 1430 today.

## 2019-03-16 VITALS
BODY MASS INDEX: 26.52 KG/M2 | SYSTOLIC BLOOD PRESSURE: 100 MMHG | OXYGEN SATURATION: 97 % | WEIGHT: 165 LBS | TEMPERATURE: 98 F | DIASTOLIC BLOOD PRESSURE: 65 MMHG | HEIGHT: 66 IN | HEART RATE: 80 BPM | RESPIRATION RATE: 26 BRPM

## 2019-03-16 NOTE — ED NOTES
Pt places on call light and asks to ambulate to restroom and for more pain medication, as the first dose did not work, per patient. Ambulation with slow but steady gait unassisted.

## 2019-03-17 LAB
ATRIAL RATE: 73 BPM
P AXIS: 24 DEGREES
P-R INTERVAL: 150 MS
Q-T INTERVAL: 384 MS
QRS DURATION: 86 MS
QTC CALCULATION (BEZET): 423 MS
R AXIS: 45 DEGREES
T AXIS: 38 DEGREES
VENTRICULAR RATE: 73 BPM

## 2019-03-18 ENCOUNTER — TELEPHONE (OUTPATIENT)
Dept: NEUROLOGY | Facility: CLINIC | Age: 49
End: 2019-03-18

## 2019-03-18 NOTE — TELEPHONE ENCOUNTER
Patient instructed to go to ER for evaluation of sob and syncope spells. She expressed full understanding.

## 2019-03-18 NOTE — TELEPHONE ENCOUNTER
Patient reports that she did feel dizzy but that has passed. No longer feels that she is about to faint. She reports that her right arm is shaking and has been for about 7 minutes.      Normal seizure presentation does not include shaking, but \"my body
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
## 2019-03-19 ENCOUNTER — HOSPITAL ENCOUNTER (OUTPATIENT)
Facility: HOSPITAL | Age: 49
Setting detail: OBSERVATION
Discharge: HOME | End: 2019-03-23
Attending: EMERGENCY MEDICINE | Admitting: INTERNAL MEDICINE
Payer: COMMERCIAL

## 2019-03-19 DIAGNOSIS — R55 SYNCOPE, UNSPECIFIED SYNCOPE TYPE: Primary | ICD-10-CM

## 2019-03-19 DIAGNOSIS — E87.6 HYPOPOTASSEMIA: ICD-10-CM

## 2019-03-19 RX ORDER — METOPROLOL SUCCINATE 25 MG/1
12.5 TABLET, EXTENDED RELEASE ORAL DAILY
COMMUNITY
End: 2019-05-30

## 2019-03-20 PROBLEM — R55 SYNCOPE, UNSPECIFIED SYNCOPE TYPE: Status: ACTIVE | Noted: 2019-03-20

## 2019-03-20 PROCEDURE — 95816 EEG AWAKE AND DROWSY: CPT | Performed by: OTHER

## 2019-03-20 PROCEDURE — 99245 OFF/OP CONSLTJ NEW/EST HI 55: CPT | Performed by: OTHER

## 2019-03-20 RX ORDER — LEVETIRACETAM 500 MG/1
500 TABLET ORAL DAILY
Status: DISCONTINUED | OUTPATIENT
Start: 2019-03-20 | End: 2019-03-23

## 2019-03-20 RX ORDER — POTASSIUM CHLORIDE 1.5 G/1.77G
40 POWDER, FOR SOLUTION ORAL EVERY 4 HOURS
Status: COMPLETED | OUTPATIENT
Start: 2019-03-20 | End: 2019-03-20

## 2019-03-20 RX ORDER — PYRIDOSTIGMINE BROMIDE 60 MG/1
60 TABLET ORAL 2 TIMES DAILY
Status: DISCONTINUED | OUTPATIENT
Start: 2019-03-20 | End: 2019-03-23

## 2019-03-20 RX ORDER — SUMATRIPTAN 50 MG/1
100 TABLET, FILM COATED ORAL DAILY PRN
Status: DISCONTINUED | OUTPATIENT
Start: 2019-03-20 | End: 2019-03-23

## 2019-03-20 RX ORDER — TOPIRAMATE 100 MG/1
100 TABLET, FILM COATED ORAL 2 TIMES DAILY
Status: DISCONTINUED | OUTPATIENT
Start: 2019-03-20 | End: 2019-03-23

## 2019-03-20 RX ORDER — ENOXAPARIN SODIUM 100 MG/ML
40 INJECTION SUBCUTANEOUS DAILY
Status: DISCONTINUED | OUTPATIENT
Start: 2019-03-20 | End: 2019-03-23

## 2019-03-20 RX ORDER — POTASSIUM CHLORIDE 1.5 G/1.77G
40 POWDER, FOR SOLUTION ORAL ONCE
Status: COMPLETED | OUTPATIENT
Start: 2019-03-20 | End: 2019-03-20

## 2019-03-20 RX ORDER — LEVETIRACETAM 500 MG/1
1000 TABLET ORAL NIGHTLY
Status: DISCONTINUED | OUTPATIENT
Start: 2019-03-20 | End: 2019-03-23

## 2019-03-20 RX ORDER — ALBUTEROL SULFATE 90 UG/1
2 AEROSOL, METERED RESPIRATORY (INHALATION) EVERY 6 HOURS PRN
Status: DISCONTINUED | OUTPATIENT
Start: 2019-03-20 | End: 2019-03-23

## 2019-03-20 RX ORDER — ACETAMINOPHEN 325 MG/1
650 TABLET ORAL EVERY 6 HOURS PRN
Status: DISCONTINUED | OUTPATIENT
Start: 2019-03-20 | End: 2019-03-23

## 2019-03-20 RX ORDER — FOLIC ACID 1 MG/1
1 TABLET ORAL DAILY
Status: DISCONTINUED | OUTPATIENT
Start: 2019-03-20 | End: 2019-03-23

## 2019-03-20 NOTE — ED PROVIDER NOTES
Patient Seen in: [facility] Emergency Department    History   Patient presents with:  Syncope (cardiovascular, neurologic)    Stated Complaint: multiple syncopal episodes today    HPI    Patient is a friendly 50-year-old female here for evaluation o Never Smoker      Smokeless tobacco: Never Used    Alcohol use: No      Alcohol/week: 0.0 oz    Drug use: No      Review of Systems    Positive for stated complaint: multiple syncopal episodes today  Other systems are as noted in HPI.   Constitutional and v -----------         ------                     CBC W/ DIFFERENTIAL[[id number]]                              Final result                 Please view results for these tests on the individual orders.    CBC W/ DIFFERENTIAL     EKG    Rate, intervals a

## 2019-03-20 NOTE — ED NOTES
Patient states syncopal episodes happen during activity. While she is at work, doing chores around the house, or after walking up stairs. Patient states last seizure (partial seizures) was about one month ago d/t missed dose of keppra.

## 2019-03-20 NOTE — ED INITIAL ASSESSMENT (HPI)
Patient here today with multiple syncopal episodes. Patient spoke with her neurologist yesterday and was instructed to come in to the ER if syncope continues. Patient seen here for same this past Friday.
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
## 2019-03-20 NOTE — CONSULTS
[id number] [facility] Consultation    Date of consult: 3/20/2019    Reason for consult: syncope    HPI: Cassondra Gowers is a 51year old female with past medical history as listed below presents with episodes of falling.   She has had multipl responsive and not amnestic after a few minutes.   As soon as we mentioned she cannot drive, she \"woke up\" took her usual Keppra and blood drawn for tests.     No incontinence tongue bite or amnesia of event\"    REVIEW OF SYSTEMS:  A 10-point system was No      Alcohol/week: 0.0 oz    Family History   Problem Relation Age of Onset   • Cancer Father         lung ca-smoker   • Hypertension Mother    • Other (Other) Mother         RA   • Breast Cancer Paternal Aunt 61        alive   • Seizure Disorder [place] 23.0   ALKPHO  83   --    --    AST  11*   --    --    ALT  24   --    --    BILT  0.3   --    --    TP  7.2   --    --           Assessment:  Recurrent syncopal episodes less likely epileptic, they most likely represent either non epileptic psychogenic se

## 2019-03-20 NOTE — HISTORICAL OFFICE NOTE
Moises Coley  : 1970  ACCOUNT:  [id number]  [phone]  PCP: Dr. Ruby Rogers     TODAY'S DATE: 2011  DICTATED BY:  Lesley Patten M.D.]    CHIEF COMPLAINT: [Followup of Dyspnea.]    HPI:   This is an office followup visit for this normocephalic. EYES: conjunctivae not injected and no xanthelasma. ENT: mucosa pink and moist. NECK: jugular venous pressure not elevated. RESP: respirations with normal rate and rhythm, clear to auscultation.  GI: no masses, tenderness or hepatosplenomegal

## 2019-03-20 NOTE — PROGRESS NOTES
NURSING ADMISSION NOTE      Patient admitted via Cart  Oriented to room. Safety precautions initiated. Bed in low position. Call light in reach.       Admission navigator completed  Patient A&O x 4  Complaints of HA and pain to the right side of her

## 2019-03-20 NOTE — CONSULTS
[facility]  Electrophysiology Initial Consult Note      Aure Nicholas Patient Status:  Observation    1970 MRN [id number]   [facility] 3NE-A Attending Jen Wise MD   Hosp Day # 0 PCP Avery Glass MD FRACTURE Left    • OTHER SURGICAL HISTORY  1999    cyst removed right hand       Family Hx:  Family History   Problem Relation Age of Onset   • Cancer Father         lung ca-smoker   • Hypertension Mother    • Other (Other) Mother         RA   • Breast Can : 165 lb (74.8 kg)  03/15/19 : 165 lb (74.8 kg)  01/11/19 : 175 lb 0.7 oz (79.4 kg)      General: Alert and oriented in no apparent distress. Neuro:  A+O x 3, face symmetric  HEENT: No carotid bruit, no JVD  Cardiac: RRR, no m/r/g  Lungs: CTAB, no DTP  Abd Generalized convulsive epilepsy ([facility]     Complex partial seizure evolving to generalized seizure ([facility]     Seizure disorder ([facility]     At risk for falling     Hyponatremia     Pseudoseizures     Migraine headache     Hx of migraines     Pseudoseizure

## 2019-03-20 NOTE — PLAN OF CARE
Assumed care of patient at 0700. Patient AOx4. On RA. Having MIKE. NSR on telemetry. R eye slower to open upon waking. Complaints of HA per neurology, declines any nerve block at this time. Please let neurology know if patient is interested in this.   Bed

## 2019-03-20 NOTE — H&P
[facility]    PATIENT'S NAME: Paco Abdalla   ATTENDING PHYSICIAN: Mamta Case M.D.    PATIENT ACCOUNT#:   [de-identified]    LOCATION:  [address] [place] [address]  MEDICAL RECORD #:   [id number]       YOB: 1970  ADMISSION DATE:       03/1 atraumatic. Eyes EOMI. NECK:  Supple. No JVD. LUNGS:  Clear to auscultation. No rhonchi or wheeze. HEART:  Regular rate and rhythm. No S3.  ABDOMEN:  Bowel sounds present, soft, nontender. EXTREMITIES:  No cyanosis or icterus.   NEUROLOGIC:  Elex Fails

## 2019-03-21 ENCOUNTER — APPOINTMENT (OUTPATIENT)
Dept: CV DIAGNOSTICS | Facility: HOSPITAL | Age: 49
End: 2019-03-21
Attending: INTERNAL MEDICINE
Payer: COMMERCIAL

## 2019-03-21 PROCEDURE — 93017 CV STRESS TEST TRACING ONLY: CPT | Performed by: INTERNAL MEDICINE

## 2019-03-21 PROCEDURE — 78452 HT MUSCLE IMAGE SPECT MULT: CPT | Performed by: INTERNAL MEDICINE

## 2019-03-21 PROCEDURE — 93018 CV STRESS TEST I&R ONLY: CPT | Performed by: INTERNAL MEDICINE

## 2019-03-21 PROCEDURE — 99226 SUBSEQUENT OBSERVATION CARE: CPT | Performed by: OTHER

## 2019-03-21 RX ORDER — AMINOPHYLLINE DIHYDRATE 25 MG/ML
INJECTION, SOLUTION INTRAVENOUS
Status: COMPLETED
Start: 2019-03-21 | End: 2019-03-21

## 2019-03-21 RX ORDER — POTASSIUM CHLORIDE 20 MEQ/1
40 TABLET, EXTENDED RELEASE ORAL EVERY 4 HOURS
Status: DISCONTINUED | OUTPATIENT
Start: 2019-03-21 | End: 2019-03-21

## 2019-03-21 RX ORDER — POTASSIUM CHLORIDE 1.5 G/1.77G
40 POWDER, FOR SOLUTION ORAL EVERY 4 HOURS
Status: COMPLETED | OUTPATIENT
Start: 2019-03-21 | End: 2019-03-21

## 2019-03-21 NOTE — PHYSICAL THERAPY NOTE
Received PT orders, chart review completed. Pt admitted for multiple syncopal episodes.  Per notes, Pt also experienced another \"spell\" of decr consciousness within past hour while at [place] nuclear stress test. Activity orders are currently for bedrest

## 2019-03-21 NOTE — PROGRESS NOTES
Here for [facility] Nuclear stress. Explained test and patient agreed. Upon completion of [facility], patient stated \"I feel tired\". Her head went back in chair, dropped the ball from her hand and was difficult to arouse for about 1 minute.   Hr maintained

## 2019-03-21 NOTE — PLAN OF CARE
Patient A&O x 4  Complaints of HA and pain to the right side of her head. PRN Sumatriptan. If needed. Neurology offered Nerve block if patient would like. Patient declined at this time  Seizure precautions D/T hx of seizure.  No seizure activity noted this

## 2019-03-21 NOTE — PROGRESS NOTES
[facility]  Progress Note    Easton Melvin Patient Status:  Observation    1970 MRN [id number]   [facility] 3NE-A Attending Kathia Pichardo MD   Hosp Day # 0 PCP Mira Patricia MD         SUBJECTIVE:  Subjective:  Rinku Danger results for input(s): PGLU in the last 168 hours. No results for input(s): URINE, CULTI, BLDSMR in the last 168 hours.             Meds:     • potassium chloride  40 mEq Oral Q4H   • enoxaparin  40 mg Subcutaneous Daily   • levETIRAcetam  500 mg Oral Shade Case

## 2019-03-21 NOTE — PROGRESS NOTES
[id number] [facility] Progress Note    Esperanza Hearing Patient Status:  Observation    1970 MRN [id number]   [facility] 3NE-A Attending Zi Alan MD   Hosp Day # 0 PCP Franklin Bay MD         Subjective:  Nikkie Rivas either non epileptic psychogenic seizure or vasovagal / cardiac etiology  History of both complex partial seizure evolving to generalized seizure and Pseudoseizures  Migraine with aura and without status migrainosus, not intractable      Plan:  Cardiology

## 2019-03-22 PROCEDURE — 99243 OFF/OP CNSLTJ NEW/EST LOW 30: CPT | Performed by: INTERNAL MEDICINE

## 2019-03-22 RX ORDER — POTASSIUM CHLORIDE 1.5 G/1.77G
40 POWDER, FOR SOLUTION ORAL ONCE
Status: COMPLETED | OUTPATIENT
Start: 2019-03-22 | End: 2019-03-22

## 2019-03-22 RX ORDER — SODIUM CHLORIDE 9 MG/ML
INJECTION, SOLUTION INTRAVENOUS ONCE
Status: COMPLETED | OUTPATIENT
Start: 2019-03-22 | End: 2019-03-22
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
## 2019-03-22 NOTE — PLAN OF CARE
A&Ox4; pleasant  Seizure precations for hx- on keppra  Migraines- daily imitrex  Cardiac workup- EKG normal, stress test normal-- had a syncopal episode during exam  Pt up with SBA  Nephrology consulted- urine sample needed- pt aware; supplies in bathroom

## 2019-03-22 NOTE — PROGRESS NOTES
A&Ox4; pleasant  Seizure precations for hx- on jeppra  Migraines- daily imitrex  Cardiac workup- EKG normal, stress test normal-- had a syncopal episode during exam  Pt up with SBA- walked pt in hallways in afternoon and pt again had a \"syncopal episode\"

## 2019-03-22 NOTE — PLAN OF CARE
METABOLIC/FLUID AND ELECTROLYTES - ADULT    • Electrolytes maintained within normal limits Progressing        MUSCULOSKELETAL - ADULT    • Return mobility to safest level of function Progressing        NEUROLOGICAL - ADULT    • Achieves stable or improved

## 2019-03-22 NOTE — PHYSICAL THERAPY NOTE
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 SUBJECTIVE  \"I will try. \"    Patient self-stated goal is to get back to work. OBJECTIVE  Precautions: Seizure; Other (Comment)(monitor BP)  Fall Risk: High fall risk    WEIGHT BEARING RESTRICTION  Weight Beari (ft): 80  Assistive Device: None  Pattern: Shuffle  Stoop/Curb Assistance: Not tested  Comment : Score based on FIM definitions per department protocol    Skilled Therapy Provided:   Session approved by RN. Pt received supine in bed, agreeable to therapy. bed mobility, transfers, and gait/stair negotiation. The patient is below baseline and would benefit from skilled inpatient PT to address the above deficits to assist patient in returning to prior to level of function.   DISCHARGE RECOMMENDATIONS  PT Disch

## 2019-03-22 NOTE — PROGRESS NOTES
[facility]  Progress Note    Vik Flores Patient Status:  Observation    1970 MRN [id number]   [facility] 3NE-A Attending Anthony Hoffman MD   Hosp Day # 0 PCP Jose L Ho MD         SUBJECTIVE:  Subjective:  Uma Carter --    --    CA  9.4  8.8  8.3*   --    --    --    GLU  91  96  98   --    --    --        Recent Labs   Lab  03/15/19   2009   ALT  24   AST  11*   ALB  3.9       No results for input(s): PGLU in the last 168 hours.     No results for input(s): URINE, CULT

## 2019-03-23 VITALS
SYSTOLIC BLOOD PRESSURE: 100 MMHG | HEART RATE: 59 BPM | OXYGEN SATURATION: 97 % | HEIGHT: 66 IN | BODY MASS INDEX: 26.52 KG/M2 | WEIGHT: 165 LBS | DIASTOLIC BLOOD PRESSURE: 66 MMHG | TEMPERATURE: 98 F | RESPIRATION RATE: 16 BRPM

## 2019-03-23 PROCEDURE — 99225 SUBSEQUENT OBSERVATION CARE: CPT | Performed by: INTERNAL MEDICINE

## 2019-03-23 RX ORDER — FOLIC ACID 1 MG/1
1 TABLET ORAL DAILY
Qty: 30 TABLET | Refills: 0 | Status: SHIPPED | OUTPATIENT
Start: 2019-03-24 | End: 2019-10-31 | Stop reason: ALTCHOICE

## 2019-03-23 RX ORDER — SPIRONOLACTONE 25 MG/1
25 TABLET ORAL DAILY
Status: DISCONTINUED | OUTPATIENT
Start: 2019-03-23 | End: 2019-03-23

## 2019-03-23 RX ORDER — MAGNESIUM OXIDE 400 MG (241.3 MG MAGNESIUM) TABLET
400 TABLET DAILY
Status: DISCONTINUED | OUTPATIENT
Start: 2019-03-23 | End: 2019-03-23

## 2019-03-23 RX ORDER — AMILORIDE HYDROCHLORIDE 5 MG/1
5 TABLET ORAL DAILY
Status: DISCONTINUED | OUTPATIENT
Start: 2019-03-23 | End: 2019-03-23

## 2019-03-23 RX ORDER — MAGNESIUM OXIDE 400 MG (241.3 MG MAGNESIUM) TABLET
400 TABLET DAILY
Qty: 30 TABLET | Refills: 0 | Status: SHIPPED | OUTPATIENT
Start: 2019-03-23 | End: 2019-05-30

## 2019-03-23 RX ORDER — SPIRONOLACTONE 25 MG/1
25 TABLET ORAL DAILY
Qty: 30 TABLET | Refills: 0 | Status: SHIPPED | OUTPATIENT
Start: 2019-03-23 | End: 2019-04-22

## 2019-03-23 RX ORDER — POTASSIUM CHLORIDE 1.5 G/1.77G
40 POWDER, FOR SOLUTION ORAL DAILY
Status: DISCONTINUED | OUTPATIENT
Start: 2019-03-23 | End: 2019-03-23

## 2019-03-23 RX ORDER — POTASSIUM CHLORIDE 1.5 G/1.77G
40 POWDER, FOR SOLUTION ORAL DAILY
Qty: 30 PACKET | Refills: 1 | Status: SHIPPED | OUTPATIENT
Start: 2019-03-23 | End: 2019-05-08 | Stop reason: DRUGHIGH

## 2019-03-23 NOTE — PROGRESS NOTES
[facility]  Progress Note    Bree Primus Patient Status:  Observation    1970 MRN [id number]   [facility] 3NE-A Attending Jonathan Gruber MD   Hosp Day # 0 PCP Carter Rosales MD         SUBJECTIVE:  Subjective:  Jayy Acosta 0. 67   CA  8.8  8.3*   --    --    --   9.0   MG   --    --    --    --    --   2.3   GLU  96  98   --    --    --   89       No results for input(s): ALT, AST, ALB, AMYLASE, LIPASE, LDH in the last 168 hours.     Invalid input(s): ALPHOS, TBIL, DBIL, TPROT

## 2019-03-23 NOTE — PLAN OF CARE
Impaired Functional Mobility    • Achieve highest/safest level of mobility/gait Progressing        METABOLIC/FLUID AND ELECTROLYTES - ADULT    • Electrolytes maintained within normal limits Progressing        MUSCULOSKELETAL - ADULT    • Return mobility to

## 2019-03-23 NOTE — PROGRESS NOTES
[facility]  Progress Note    Charliebennie Lamb Patient Status:  Observation    1970 MRN [id number]   [facility] 3NE-A Attending Nii Napier MD   Hosp Day # 0 PCP Juan Medina MD     + headache this am  BP low this am  Akilah Jin point review of systems otherwise reviewed and negative.      Physical Exam:  /66 (BP Location: Left arm)   Pulse 59   Temp 97.5 °F (36.4 °C) (Oral)   Resp 16   Ht 66\"   Wt 165 lb   SpO2 97%   BMI 26.63 kg/m²   Temp (24hrs), Av.7 °F (36.5 °C), Mi syndrome  - check abg  - will start on oral K/Mg + aldactone 25 daily  - monitor labs    2.  Hypotension - normal cortisol in past   Advised to liberalize salt intake    Elmarie Schlatter  3/23/2019

## 2019-04-01 ENCOUNTER — TELEPHONE (OUTPATIENT)
Dept: CARDIOLOGY | Age: 49
End: 2019-04-01

## 2019-04-10 ENCOUNTER — OFFICE VISIT (OUTPATIENT)
Dept: NEUROLOGY | Facility: CLINIC | Age: 49
End: 2019-04-10
Payer: COMMERCIAL

## 2019-04-10 VITALS
DIASTOLIC BLOOD PRESSURE: 68 MMHG | WEIGHT: 164 LBS | SYSTOLIC BLOOD PRESSURE: 122 MMHG | RESPIRATION RATE: 15 BRPM | HEART RATE: 66 BPM | BODY MASS INDEX: 26 KG/M2

## 2019-04-10 DIAGNOSIS — G25.0 TREMOR, ESSENTIAL: ICD-10-CM

## 2019-04-10 DIAGNOSIS — H02.401 PTOSIS OF RIGHT EYELID: Primary | ICD-10-CM

## 2019-04-10 DIAGNOSIS — G40.209 COMPLEX PARTIAL SEIZURE EVOLVING TO GENERALIZED SEIZURE (HCC): ICD-10-CM

## 2019-04-10 DIAGNOSIS — F44.5 PSEUDOSEIZURE: ICD-10-CM

## 2019-04-10 PROCEDURE — 99214 OFFICE O/P EST MOD 30 MIN: CPT | Performed by: PHYSICIAN ASSISTANT
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
## 2019-04-13 ENCOUNTER — APPOINTMENT (OUTPATIENT)
Dept: LAB | Age: 49
End: 2019-04-13
Attending: PHYSICIAN ASSISTANT
Payer: COMMERCIAL

## 2019-04-13 DIAGNOSIS — H02.401 PTOSIS OF RIGHT EYELID: ICD-10-CM

## 2019-04-13 PROCEDURE — 36415 COLL VENOUS BLD VENIPUNCTURE: CPT | Performed by: PHYSICIAN ASSISTANT

## 2019-04-13 PROCEDURE — 84238 ASSAY NONENDOCRINE RECEPTOR: CPT | Performed by: PHYSICIAN ASSISTANT

## 2019-04-13 PROCEDURE — 83516 IMMUNOASSAY NONANTIBODY: CPT | Performed by: PHYSICIAN ASSISTANT

## 2019-04-13 PROCEDURE — 83519 RIA NONANTIBODY: CPT | Performed by: PHYSICIAN ASSISTANT

## 2019-04-13 PROCEDURE — 86255 FLUORESCENT ANTIBODY SCREEN: CPT | Performed by: PHYSICIAN ASSISTANT

## 2019-04-16 NOTE — DISCHARGE SUMMARY
[facility]  Discharge Summary    Jocelyn Puentes Patient Status:  Observation    1970 MRN [id number]   [facility] 3NE-A Attending No att. providers found   Hosp Day # 0 PCP Venice Hendrix MD     Date of Admission: 3/19/2019 medications    folic acid 1 MG Oral Tab  Take 1 tablet (1 mg total) by mouth daily. , Normal, Disp-30 tablet, R-0    magnesium oxide 400 MG Oral Tab  Take 1 tablet (400 mg total) by mouth daily. , Normal, Disp-30 tablet, R-0    potassium chloride 20 MEQ Oral

## 2019-04-18 ENCOUNTER — TELEPHONE (OUTPATIENT)
Dept: NEUROLOGY | Facility: CLINIC | Age: 49
End: 2019-04-18

## 2019-04-19 NOTE — TELEPHONE ENCOUNTER
Please call to inform patient that myasthenia panel was negative.  Can we follow-up on if her TSH was drawn

## 2019-04-19 NOTE — TELEPHONE ENCOUNTER
Notified Kelvin Walton, about the test result and asked her to get TSH drawn soon. Will follow up on her after labs are drawn. All the questions answered and patient verbalized the understanding.

## 2019-04-22 RX ORDER — SPIRONOLACTONE 25 MG/1
TABLET ORAL
Qty: 30 TABLET | Refills: 5 | Status: SHIPPED | OUTPATIENT
Start: 2019-04-22 | End: 2019-09-09

## 2019-05-03 ENCOUNTER — TELEPHONE (OUTPATIENT)
Dept: NEUROLOGY | Facility: CLINIC | Age: 49
End: 2019-05-03

## 2019-05-08 ENCOUNTER — OFFICE VISIT (OUTPATIENT)
Dept: NEUROLOGY | Facility: CLINIC | Age: 49
End: 2019-05-08
Payer: COMMERCIAL

## 2019-05-08 VITALS
SYSTOLIC BLOOD PRESSURE: 100 MMHG | WEIGHT: 172 LBS | HEART RATE: 74 BPM | HEIGHT: 66 IN | DIASTOLIC BLOOD PRESSURE: 60 MMHG | RESPIRATION RATE: 16 BRPM | BODY MASS INDEX: 27.64 KG/M2

## 2019-05-08 DIAGNOSIS — G25.0 TREMOR, ESSENTIAL: ICD-10-CM

## 2019-05-08 DIAGNOSIS — F44.5 PSEUDOSEIZURE: ICD-10-CM

## 2019-05-08 DIAGNOSIS — H02.401 PTOSIS OF RIGHT EYELID: ICD-10-CM

## 2019-05-08 DIAGNOSIS — G40.209 COMPLEX PARTIAL SEIZURE EVOLVING TO GENERALIZED SEIZURE (HCC): Primary | ICD-10-CM

## 2019-05-08 PROCEDURE — 99214 OFFICE O/P EST MOD 30 MIN: CPT | Performed by: PHYSICIAN ASSISTANT

## 2019-05-08 RX ORDER — POTASSIUM CHLORIDE 20 MEQ/1
20 TABLET, EXTENDED RELEASE ORAL AS NEEDED
COMMUNITY
End: 2021-06-03

## 2019-05-08 NOTE — PROGRESS NOTES
[facility] with [address]  7/2/1970  Primary Care Provider:  Bryson Gosselin, MD    5/8/2019  Accompanied visit: No        51year old female patient being seen for: Seizure •  LEVETIRACETAM 500 MG Oral Tab, TAKE 1 TABLET BY MOUTH EVERY MORNING AND 2 TABLETS EVERY NIGHT AT BEDTIME, Disp: 90 tablet, Rfl: 5  •  Pyridostigmine Bromide 60 MG Oral Tab, Take 60 mg by mouth 2 (two) times daily.   , Disp: , Rfl:   •  SUMATRIPTAN SUCC TAWANDA Green  [facility]  5/8/2019, Time completed 10:17 AM

## 2019-05-30 NOTE — PROGRESS NOTES
[facility] with [address]  7/2/1970  Primary Care Provider:  Carter Rosales MD    5/30/2019  Accompanied visit:      (x) No.        51year old yo patient being seen for: mucosa  No LAD, neck supple  Lungs clear breath sounds  Heart S1S2, no abnormal sounds  Pink nailbeds no Cyanosis, pulses palpated    NEURO  Neuro: Alert oriented with normal CN 2-12. No motor and sensory deficits.  DTRs were symmetric and no upper motor ne

## 2019-05-31 ENCOUNTER — TELEPHONE (OUTPATIENT)
Dept: NEUROLOGY | Facility: CLINIC | Age: 49
End: 2019-05-31

## 2019-05-31 NOTE — TELEPHONE ENCOUNTER
Spoke with pt, pt stated she was instructed to reduce Pyridostigmine Bromide, pt states she is having difficulty breathing since. Pt was coughing, and was audibly wheezing.  Respiration rate is increased, pt was unable to catch her breath during phone conve

## 2019-06-11 NOTE — TELEPHONE ENCOUNTER
Pt had a seizure in her sleep on Sunday and then last night when she was outside she had another seizure witnessed by her . She has been feeling very tired.

## 2019-06-12 NOTE — TELEPHONE ENCOUNTER
S: Lyssa Parekh had two seizure episodes. One on Sunday night and late Monday evening. B: pseudoseizure, ptosis of right eyelid.  The diagnosis of Myasthenia Gravis is a bit not on solid ground but her symptoms are consistent,  She gives a good history that i

## 2019-06-13 NOTE — TELEPHONE ENCOUNTER
Refer her to Dr Mart Fiedls or [facility] for the Myasthenia  Get levels of Levetiracetam    Dr Luis Eduardo Ventura

## 2019-06-14 NOTE — TELEPHONE ENCOUNTER
Talked with Beba Li. She had two seizure episodes on Wednesday and Thursday night. She had jerking movement and than her body went stiff for sometime. Patient is very tired.     RN explained to the patient to see Dr Haresh Smalls, because he is neuromuscular

## 2019-06-25 ENCOUNTER — PROCEDURE VISIT (OUTPATIENT)
Dept: NEUROLOGY | Facility: CLINIC | Age: 49
End: 2019-06-25
Payer: COMMERCIAL

## 2019-06-25 DIAGNOSIS — F44.5 PSEUDOSEIZURE: ICD-10-CM

## 2019-06-25 DIAGNOSIS — R53.1 WEAKNESS: ICD-10-CM

## 2019-06-25 DIAGNOSIS — G40.909 SEIZURE CEREBRAL (HCC): ICD-10-CM

## 2019-06-25 DIAGNOSIS — G90.9 DYSFUNCTIONAL AUTONOMIC NERVOUS SYSTEM: ICD-10-CM

## 2019-06-25 DIAGNOSIS — G40.901 STATUS EPILEPTICUS (HCC): Primary | ICD-10-CM

## 2019-06-25 DIAGNOSIS — H02.401 PTOSIS OF RIGHT EYELID: ICD-10-CM

## 2019-06-25 DIAGNOSIS — M54.12 CERVICAL RADICULOPATHY: ICD-10-CM

## 2019-06-25 PROCEDURE — 95909 NRV CNDJ TST 5-6 STUDIES: CPT | Performed by: OTHER

## 2019-06-25 PROCEDURE — 95886 MUSC TEST DONE W/N TEST COMP: CPT | Performed by: OTHER

## 2019-06-25 PROCEDURE — 95885 MUSC TST DONE W/NERV TST LIM: CPT | Performed by: OTHER

## 2019-06-25 NOTE — PROGRESS NOTES
[id number] [facility] with [facility]  6/25/2019    9:00 AM      She has continued drooping right eye  Getting tired it closes more  Neuroophthalmology - they did not think she had Myasthenia Gravis    She claims

## 2019-06-25 NOTE — PROCEDURES
[facility] [address]  [phone]    Fax  [phone]    Electrophysiologic Consultation      Patient: Ansley Lee  6/25 TERES N None None None None N N N N   L. TRICEPS N None None None None N N N N   L. BICEPS N None None None None N N N N   L. BRACHIORADIALIS N None None None None N N N N   L. FIRST D INTEROSS N None None None None N N N N   L. ABD POLL BREVIS N None None

## 2019-06-26 ENCOUNTER — OFFICE VISIT (OUTPATIENT)
Dept: NEUROLOGY | Facility: CLINIC | Age: 49
End: 2019-06-26
Payer: COMMERCIAL

## 2019-06-26 VITALS
HEART RATE: 65 BPM | WEIGHT: 172 LBS | BODY MASS INDEX: 27.64 KG/M2 | SYSTOLIC BLOOD PRESSURE: 106 MMHG | HEIGHT: 66 IN | DIASTOLIC BLOOD PRESSURE: 64 MMHG | RESPIRATION RATE: 16 BRPM

## 2019-06-26 DIAGNOSIS — H02.403 PTOSIS OF BOTH EYELIDS: Primary | ICD-10-CM

## 2019-06-26 DIAGNOSIS — M62.81 MUSCLE WEAKNESS: ICD-10-CM

## 2019-06-26 PROCEDURE — 99214 OFFICE O/P EST MOD 30 MIN: CPT | Performed by: OTHER

## 2019-06-26 NOTE — PROGRESS NOTES
[facility] Progress Note    HPI    Patient has been seeng my colleague Dr. Sandra Galvan, for seizures and eye ptosis and presents as a second opinion for ptosis. .    She states since February 2019, she has been having drooping of her eyes, diagnosed in 2004- 3 years younger than her   • Other (Other) Other         No adrenal, thyroid, pituitary or parathyroid issues.    • Seizure Disorder Maternal Uncle    • Diabetes Neg    • Thyroid Disorder Neg      Social History    Socioeconomic Hi Pulse 65   Resp 16   Ht 66\"   Wt 172 lb   BMI 27.76 kg/m²   Estimated body mass index is 27.76 kg/m² as calculated from the following:    Height as of this encounter: 66\". Weight as of this encounter: 172 lb.     Gen: well developed, well nourished, no normal amplitude and slightly increased   duration.  Full recruitment noted.  Rest of muscles were normal  2.  Right median and Ulnar motor and sensory conductions were   normal including F-waves  3.  Left Median repetitive stimulation study was normal Nancy Linares is a 51year old woman with PMHx significant for  pseudoseizures, who presents for evaluation of mainly R eye ptosis     Exam shows fatigable weakness with ptosis in R more than L eye with ehanced lid lag after vertical gaze.   Overall, this se

## 2019-07-07 LAB — TSH W/REFLEX TO FT4: 1.37 MIU/L

## 2019-07-09 LAB
CREATINE KINASE, TOTAL: 89 U/L (ref 29–143)
LEVETIRACETAM: 34.4 MCG/ML (ref 12–46)

## 2019-07-12 ENCOUNTER — TELEPHONE (OUTPATIENT)
Dept: NEUROLOGY | Facility: CLINIC | Age: 49
End: 2019-07-12

## 2019-07-12 NOTE — TELEPHONE ENCOUNTER
Libby Boyd is calling with increased symptoms for past few days, increasing in intensity:    - A lot of weakness in legs and when sitting her upper body is swaying front to back  - Numbness is more present in left than right but in both.  Present in feet ascen

## 2019-07-13 ENCOUNTER — HOSPITAL ENCOUNTER (INPATIENT)
Facility: HOSPITAL | Age: 49
LOS: 5 days | Discharge: HOME | DRG: 057 | End: 2019-07-18
Attending: EMERGENCY MEDICINE | Admitting: SPECIALIST
Payer: COMMERCIAL

## 2019-07-13 DIAGNOSIS — G70.00 MYASTHENIA GRAVIS (HCC): Primary | ICD-10-CM

## 2019-07-13 LAB
ALBUMIN SERPL-MCNC: 3.8 G/DL (ref 3.4–5)
ALBUMIN/GLOB SERPL: 1.2 {RATIO} (ref 1–2)
ALP LIVER SERPL-CCNC: 88 U/L (ref 39–100)
ALT SERPL-CCNC: 20 U/L (ref 13–56)
ANION GAP SERPL CALC-SCNC: 6 MMOL/L (ref 0–18)
AST SERPL-CCNC: 19 U/L (ref 15–37)
BASOPHILS # BLD AUTO: 0.03 X10(3) UL (ref 0–0.2)
BASOPHILS NFR BLD AUTO: 0.5 %
BILIRUB SERPL-MCNC: 0.3 MG/DL (ref 0.1–2)
BUN BLD-MCNC: 12 MG/DL (ref 7–18)
BUN/CREAT SERPL: 17.1 (ref 10–20)
CALCIUM BLD-MCNC: 9.1 MG/DL (ref 8.5–10.1)
CHLORIDE SERPL-SCNC: 115 MMOL/L (ref 98–112)
CO2 SERPL-SCNC: 24 MMOL/L (ref 21–32)
CREAT BLD-MCNC: 0.7 MG/DL (ref 0.55–1.02)
DEPRECATED RDW RBC AUTO: 41.1 FL (ref 35.1–46.3)
EOSINOPHIL # BLD AUTO: 0.22 X10(3) UL (ref 0–0.7)
EOSINOPHIL NFR BLD AUTO: 3.8 %
ERYTHROCYTE [DISTWIDTH] IN BLOOD BY AUTOMATED COUNT: 12.6 % (ref 11–15)
GLOBULIN PLAS-MCNC: 3.2 G/DL (ref 2.8–4.4)
GLUCOSE BLD-MCNC: 86 MG/DL (ref 70–99)
HCT VFR BLD AUTO: 42.2 % (ref 35–48)
HGB BLD-MCNC: 14 G/DL (ref 12–16)
IMM GRANULOCYTES # BLD AUTO: 0 X10(3) UL (ref 0–1)
IMM GRANULOCYTES NFR BLD: 0 %
LYMPHOCYTES # BLD AUTO: 2.27 X10(3) UL (ref 1–4)
LYMPHOCYTES NFR BLD AUTO: 39.5 %
M PROTEIN MFR SERPL ELPH: 7 G/DL (ref 6.4–8.2)
MCH RBC QN AUTO: 29.5 PG (ref 26–34)
MCHC RBC AUTO-ENTMCNC: 33.2 G/DL (ref 31–37)
MCV RBC AUTO: 89 FL (ref 80–100)
MONOCYTES # BLD AUTO: 0.41 X10(3) UL (ref 0.1–1)
MONOCYTES NFR BLD AUTO: 7.1 %
NEUTROPHILS # BLD AUTO: 2.81 X10 (3) UL (ref 1.5–7.7)
NEUTROPHILS # BLD AUTO: 2.81 X10(3) UL (ref 1.5–7.7)
NEUTROPHILS NFR BLD AUTO: 49.1 %
OSMOLALITY SERPL CALC.SUM OF ELEC: 299 MOSM/KG (ref 275–295)
PLATELET # BLD AUTO: 208 10(3)UL (ref 150–450)
POTASSIUM SERPL-SCNC: 3.3 MMOL/L (ref 3.5–5.1)
RBC # BLD AUTO: 4.74 X10(6)UL (ref 3.8–5.3)
SODIUM SERPL-SCNC: 145 MMOL/L (ref 136–145)
WBC # BLD AUTO: 5.7 X10(3) UL (ref 4–11)

## 2019-07-13 RX ORDER — POTASSIUM CHLORIDE 20 MEQ/1
20 TABLET, EXTENDED RELEASE ORAL 2 TIMES DAILY
Status: DISCONTINUED | OUTPATIENT
Start: 2019-07-13 | End: 2019-07-18

## 2019-07-13 RX ORDER — ACETAMINOPHEN 325 MG/1
650 TABLET ORAL EVERY 4 HOURS PRN
Status: DISCONTINUED | OUTPATIENT
Start: 2019-07-13 | End: 2019-07-18

## 2019-07-13 RX ORDER — TOPIRAMATE 100 MG/1
100 TABLET, FILM COATED ORAL 2 TIMES DAILY
Status: DISCONTINUED | OUTPATIENT
Start: 2019-07-13 | End: 2019-07-18

## 2019-07-13 RX ORDER — PYRIDOSTIGMINE BROMIDE 60 MG/1
60 TABLET ORAL 4 TIMES DAILY
Status: DISCONTINUED | OUTPATIENT
Start: 2019-07-13 | End: 2019-07-18

## 2019-07-13 RX ORDER — SODIUM CHLORIDE 9 MG/ML
INJECTION, SOLUTION INTRAVENOUS CONTINUOUS
Status: DISCONTINUED | OUTPATIENT
Start: 2019-07-13 | End: 2019-07-18

## 2019-07-13 RX ORDER — LEVETIRACETAM 500 MG/1
500 TABLET ORAL 2 TIMES DAILY
Status: DISCONTINUED | OUTPATIENT
Start: 2019-07-13 | End: 2019-07-13

## 2019-07-13 RX ORDER — POTASSIUM CHLORIDE 1.5 G/1.77G
20 POWDER, FOR SOLUTION ORAL ONCE
Status: COMPLETED | OUTPATIENT
Start: 2019-07-13 | End: 2019-07-13

## 2019-07-13 RX ORDER — ONDANSETRON 2 MG/ML
4 INJECTION INTRAMUSCULAR; INTRAVENOUS EVERY 4 HOURS PRN
Status: DISCONTINUED | OUTPATIENT
Start: 2019-07-13 | End: 2019-07-18

## 2019-07-13 RX ORDER — LEVETIRACETAM 500 MG/1
1000 TABLET ORAL NIGHTLY
Status: DISCONTINUED | OUTPATIENT
Start: 2019-07-14 | End: 2019-07-18

## 2019-07-13 RX ORDER — SODIUM CHLORIDE 9 MG/ML
1000 INJECTION, SOLUTION INTRAVENOUS ONCE
Status: COMPLETED | OUTPATIENT
Start: 2019-07-13 | End: 2019-07-13

## 2019-07-13 RX ORDER — LEVETIRACETAM 500 MG/1
500 TABLET ORAL DAILY
Status: DISCONTINUED | OUTPATIENT
Start: 2019-07-14 | End: 2019-07-18

## 2019-07-14 PROCEDURE — 99255 IP/OBS CONSLTJ NEW/EST HI 80: CPT | Performed by: OTHER

## 2019-07-14 PROCEDURE — 30233S1 TRANSFUSION OF NONAUTOLOGOUS GLOBULIN INTO PERIPHERAL VEIN, PERCUTANEOUS APPROACH: ICD-10-PCS | Performed by: SPECIALIST

## 2019-07-14 RX ORDER — FOLIC ACID 1 MG/1
1 TABLET ORAL DAILY
Status: DISCONTINUED | OUTPATIENT
Start: 2019-07-14 | End: 2019-07-18

## 2019-07-14 RX ORDER — SUMATRIPTAN 50 MG/1
50 TABLET, FILM COATED ORAL 2 TIMES DAILY PRN
Status: DISCONTINUED | OUTPATIENT
Start: 2019-07-14 | End: 2019-07-18

## 2019-07-14 RX ORDER — SPIRONOLACTONE 25 MG/1
25 TABLET ORAL
Status: DISCONTINUED | OUTPATIENT
Start: 2019-07-14 | End: 2019-07-18

## 2019-07-14 RX ORDER — ALBUTEROL SULFATE 90 UG/1
2 AEROSOL, METERED RESPIRATORY (INHALATION) EVERY 6 HOURS PRN
Status: DISCONTINUED | OUTPATIENT
Start: 2019-07-14 | End: 2019-07-18

## 2019-07-14 RX ORDER — HEPARIN SODIUM 5000 [USP'U]/ML
5000 INJECTION, SOLUTION INTRAVENOUS; SUBCUTANEOUS EVERY 12 HOURS
Status: DISCONTINUED | OUTPATIENT
Start: 2019-07-14 | End: 2019-07-18

## 2019-07-14 NOTE — CONSULTS
Neurology H&P    Bethanie Zhu Patient Status:  Inpatient    1970 MRN [id number]   [facility] 4NW-A Attending Damián Manriquez MD   Hosp Day # 1 PCP Carol Juan MD     Subjective:  Kovacs Marko is a(n) 47year old fem Paresthesia and pain of extremity     Syncope, unspecified syncope type     Hypokalemic nephropathy     Ptosis of right eyelid     Myasthenia gravis ([facility] [address] [address]      PMHx:  Past Medical History:   Diagnosis Date   • Adrenal adenoma Dx in 4/2010     Incidentally unexplained weight loss or gain, no new fatigue  Musculoskeletal: denies back pain, denies joint pain  Psych: denies depression, denies anxiety   Skin: denies any new masses, rashes, lumps or bruising    Objective/Physical Exam:    Vital Signs:  Blood pres repetitive stimulation and MG panel and MUSK panel were al normal/negative. I will start IVIG 0.4g/kg and get daily NIF and VC for possible Ab negative MG. She reports has improved in the past with increased doses of mestinon.  We will continue to monitor f

## 2019-07-14 NOTE — ED PROVIDER NOTES
Patient Seen in: [facility] Emergency Department    History   Patient presents with:  Numbness Weakness (neurologic)    Stated Complaint: bilateral leg weakness and numbness, sent to ER by neurologist     HPI    The patient is a 79-year-old female b [07/13/19 3084]   /70   Pulse 88   Resp 18   Temp 97.8 °F (36.6 °C)   Temp src Oral   SpO2 100 %   O2 Device None (Room air)       Current:/74 (BP Location: Right arm)   Pulse 72   Temp 97.7 °F (36.5 °C) (Oral)   Resp 18   Ht 167.6 cm (5' 6\") components:       Result Value    Potassium 3.3 (*)     Chloride 115 (*)     Calculated Osmolality 299 (*)     All other components within normal limits   CBC WITH DIFFERENTIAL WITH PLATELET    Narrative:      The following orders were created for panel ord

## 2019-07-14 NOTE — H&P
[address]    PATIENT'S NAME: Frieda Darnell   ATTENDING PHYSICIAN: Bryson Gosselin, M.D.    PATIENT ACCOUNT#:   [de-identified]    LOCATION:  [address] [place] [address]  MEDICAL RECORD #:   [id number]       YOB: 1970  ADMISSION DATE:       07/13 Warm.  No rashes. LABORATORY DATA:  Sodium 145, potassium 3.3, chloride 115, BUN 12, creatinine 0.7. WBC 5.7, hemoglobin 14, platelet 279,407.      IMPRESSION:    3   A 25-year-old female, currently admitted with myasthenia gravis with possible exacerb

## 2019-07-14 NOTE — PLAN OF CARE
NURSING ADMISSION NOTE      Patient admitted via cart. Oriented to room. Safety precautions initiated. Bed in low position. Call light in reach. Admitted patient from ED due to Myasthenia Gravis.  Patient observed with muscle weakness, BLE numbne

## 2019-07-14 NOTE — PLAN OF CARE
Problem: Patient/Family Goals  Goal: Patient/Family Long Term Goal  Description  Patient's Long Term Goal:    Interventions:  - See additional Care Plan goals for specific interventions  Outcome: Progressing  Goal: Patient/Family Short Term Goal  Descrip assistance with activity based on assessment  Outcome: Progressing  Goal: Achieves maximal functionality and self care  Description  INTERVENTIONS  - Monitor swallowing and airway patency with patient fatigue and changes in neurological status  - Encourage BLE feels numb, able to move within norm, instructed to call always call for help when getting up. IVIG completed, tolerated well.

## 2019-07-15 ENCOUNTER — APPOINTMENT (OUTPATIENT)
Dept: MRI IMAGING | Facility: HOSPITAL | Age: 49
DRG: 057 | End: 2019-07-15
Attending: Other
Payer: COMMERCIAL

## 2019-07-15 LAB
ALBUMIN SERPL-MCNC: 3.3 G/DL (ref 3.4–5)
ALBUMIN/GLOB SERPL: 0.9 {RATIO} (ref 1–2)
ALP LIVER SERPL-CCNC: 77 U/L (ref 39–100)
ALT SERPL-CCNC: 17 U/L (ref 13–56)
ANION GAP SERPL CALC-SCNC: 7 MMOL/L (ref 0–18)
AST SERPL-CCNC: 12 U/L (ref 15–37)
BASOPHILS # BLD AUTO: 0.04 X10(3) UL (ref 0–0.2)
BASOPHILS NFR BLD AUTO: 1 %
BILIRUB SERPL-MCNC: 0.2 MG/DL (ref 0.1–2)
BUN BLD-MCNC: 13 MG/DL (ref 7–18)
BUN/CREAT SERPL: 20.6 (ref 10–20)
CALCIUM BLD-MCNC: 9.2 MG/DL (ref 8.5–10.1)
CHLORIDE SERPL-SCNC: 112 MMOL/L (ref 98–112)
CO2 SERPL-SCNC: 23 MMOL/L (ref 21–32)
CREAT BLD-MCNC: 0.63 MG/DL (ref 0.55–1.02)
DEPRECATED RDW RBC AUTO: 40.7 FL (ref 35.1–46.3)
EOSINOPHIL # BLD AUTO: 0.25 X10(3) UL (ref 0–0.7)
EOSINOPHIL NFR BLD AUTO: 6.2 %
ERYTHROCYTE [DISTWIDTH] IN BLOOD BY AUTOMATED COUNT: 12.5 % (ref 11–15)
GLOBULIN PLAS-MCNC: 3.8 G/DL (ref 2.8–4.4)
GLUCOSE BLD-MCNC: 104 MG/DL (ref 70–99)
HCT VFR BLD AUTO: 42.7 % (ref 35–48)
HGB BLD-MCNC: 13.9 G/DL (ref 12–16)
IMM GRANULOCYTES # BLD AUTO: 0.01 X10(3) UL (ref 0–1)
IMM GRANULOCYTES NFR BLD: 0.2 %
LYMPHOCYTES # BLD AUTO: 1.51 X10(3) UL (ref 1–4)
LYMPHOCYTES NFR BLD AUTO: 37.7 %
M PROTEIN MFR SERPL ELPH: 7.1 G/DL (ref 6.4–8.2)
MCH RBC QN AUTO: 29.3 PG (ref 26–34)
MCHC RBC AUTO-ENTMCNC: 32.6 G/DL (ref 31–37)
MCV RBC AUTO: 89.9 FL (ref 80–100)
MONOCYTES # BLD AUTO: 0.26 X10(3) UL (ref 0.1–1)
MONOCYTES NFR BLD AUTO: 6.5 %
NEUTROPHILS # BLD AUTO: 1.94 X10 (3) UL (ref 1.5–7.7)
NEUTROPHILS # BLD AUTO: 1.94 X10(3) UL (ref 1.5–7.7)
NEUTROPHILS NFR BLD AUTO: 48.4 %
OSMOLALITY SERPL CALC.SUM OF ELEC: 294 MOSM/KG (ref 275–295)
PLATELET # BLD AUTO: 188 10(3)UL (ref 150–450)
POTASSIUM SERPL-SCNC: 3.6 MMOL/L (ref 3.5–5.1)
RBC # BLD AUTO: 4.75 X10(6)UL (ref 3.8–5.3)
SODIUM SERPL-SCNC: 142 MMOL/L (ref 136–145)
WBC # BLD AUTO: 4 X10(3) UL (ref 4–11)

## 2019-07-15 PROCEDURE — 72156 MRI NECK SPINE W/O & W/DYE: CPT | Performed by: OTHER

## 2019-07-15 PROCEDURE — 99233 SBSQ HOSP IP/OBS HIGH 50: CPT | Performed by: OTHER

## 2019-07-15 RX ORDER — IBUPROFEN 200 MG
200 TABLET ORAL EVERY 6 HOURS PRN
Status: DISCONTINUED | OUTPATIENT
Start: 2019-07-15 | End: 2019-07-16

## 2019-07-15 NOTE — OCCUPATIONAL THERAPY NOTE
OCCUPATIONAL THERAPY EVALUATION - INPATIENT     Room Number: 216/141-S  Evaluation Date: 7/15/2019  Type of Evaluation: Initial  Presenting Problem: (myasthenia gravis)    Physician Order: IP Consult to Occupational Therapy  Reason for Therapy: ADL/IADL Dy Reading glasses    Prior Level of Function: Typically independent with all ADLs, IADLs. 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 Lives with spouse and 17y/o son. Patient reports she has been unable to complete cooking tasks, and becomes tired/SOB even when eating . clothing?: None  -   Taking care of personal grooming such as brushing teeth?: None  -   Eating meals?: None    AM-PAC Score:  Score: 20  Approx Degree of Impairment: 38.32%  Standardized Score (AM-PAC Scale): 42.03  CMS Modifier (G-Code): CJ    FUNCTIONAL Inpatient Daily Activity Short Form was completed and  this patient  is demonstrating a 38% degree impairment in activities of daily living. Research supports that patients with this level of impairment often benefit from home.     Recommend home with famil

## 2019-07-15 NOTE — PHYSICAL THERAPY NOTE
PHYSICAL THERAPY EVALUATION - INPATIENT     Room Number: 179/705-B  Evaluation Date: 7/15/2019  Type of Evaluation: Initial  Physician Order: PT Eval and Treat    Presenting Problem:  B LE weakness;myasthenia gravis  Reason for Therapy: Mobility Dysfu quickly. \"    Patient self-stated goal is to return home.     OBJECTIVE  Precautions: None  Fall Risk: High fall risk    WEIGHT BEARING RESTRICTION  Weight Bearing Restriction: None                PAIN ASSESSMENT  Rating: Unable to rate  Location: Shoulders assistance(actual is sup;scores based on FIM rating scale)  Distance (ft): 100  Assistive Device: Rolling walker  Pattern: Shuffle  Stoop/Curb Assistance: Not tested       Skilled Therapy Provided: evaluation; pt instructed in the role of PT, POC and D/C p Established Goals: 5      CURRENT GOALS    Goal #1 Patient is able to demonstrate supine - sit EOB @ level: modified independent     Goal #2 Patient is able to demonstrate transfers Sit to/from Stand at assistance level: modified independent     Goal #3 Pa

## 2019-07-15 NOTE — PROGRESS NOTES
[id number] [facility] Progress Note    Joey Simmons Patient Status:  Inpatient    1970 MRN [id number]   [facility] 4NW-A Attending Javi Damon MD   Hosp Day # 2 PCP Ruby Rogers MD         Subjective:  Roma Dura 0.4g/kg x 5 days (day 2)  Daily NIF/VC, -30/1.89  Continue pyridostigmine 60mg QID (home dose)  Continue levetiracetam 500/1000 (home dose)  Continue topiramate 100mg BID    Scottie Martins  7/15/2019, 12:06 PM  Spectre 6

## 2019-07-15 NOTE — PROGRESS NOTES
[id number] [facility] Progress Note    Estela Olivarez Patient Status:  Inpatient    1970 MRN [id number]   [facility] 4NW-A Attending Gris Mg MD   Hosp Day # 2 PCP Nickolas Sandhu MD     Subjective:  Lambert Herring otherwise 5/5 strength throughout, tone normal  Sensory: intact to light touch throughout   Coord: FNF intact with no tremor or dysmetria  Romberg:deferred  Gait: deferred    Labs:  Reviewed in EPIC;   Lab Results   Component Value Date    WBC 4.0 07/15/20 95 07/23/2014     Lab Results   Component Value Date    AST 12 (L) 07/15/2019    AST 19 07/13/2019    AST 11 (L) 03/15/2019     Lab Results   Component Value Date    ALT 17 07/15/2019    ALT 20 07/13/2019    ALT 24 03/15/2019       Imaging:   No new imagin

## 2019-07-15 NOTE — PLAN OF CARE
VSS and afebrile. A&O times 4. Complaints of migraine, imitrex given per mar with good relief. Denies any nausea/vomiting. IVF infusing. On room air. Denies any SOB. Up with assist with walker. 2/5 doses of IVIG tomorrow.   Call light within reach,

## 2019-07-15 NOTE — PAYOR COMM NOTE
--------------  ADMISSION REVIEW     Payor: [address]  Subscriber #:  [id number]  Authorization Number: [id number]     Admit date: 7/13/19  Admit time: 65       Admitting Physician: Oc Amador MD  Attending Physician:   Oc Amador hypokalemic on labs. This was repleted in the ED. Remainder of labs are reassuring. I discussed the patient with Dr. Yudith Parks of neurology. I believe her symptoms are likely due to worsening myasthenia gravis.   He recommends admission to the [facility] that her ptosis seems to be getting worse. Has had a myasthenia work up in the recent past including MG panel, MUSK and EMG with repetitive stim and al were negative.  States today that she has baseline SOB and ptosis but no significant weakness at this patricia YOU.

## 2019-07-15 NOTE — PROGRESS NOTES
[facility]  Progress Note    Estela Olivarez Patient Status:  Inpatient    1970 MRN [id number]   [facility] 4NW-A Attending Gris Mg MD   Hosp Day # 2 PCP Nickolas Sandhu MD         SUBJECTIVE:  Subjective:  Misael Huang hours.            Meds:     • folic acid  1 mg Oral Daily   • spironolactone  25 mg Oral Daily   • Heparin Sodium (Porcine)  5,000 Units Subcutaneous Q12H   • immune globulin  25 g Intravenous Daily   • Potassium Chloride ER  20 mEq Oral BID   • Pyridostig
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
## 2019-07-15 NOTE — PLAN OF CARE
Pt AOx4. Still with c/o N/T in BLE, weakness in all extremities, upper back and leg pain 9/10. PCP contacted for new pain medication orders--hesitant to order narcotics d/t neurological deficit--prn motrin ordered and administered. Up with PT/OT today.  AMADO

## 2019-07-16 PROCEDURE — 99233 SBSQ HOSP IP/OBS HIGH 50: CPT | Performed by: OTHER

## 2019-07-16 RX ORDER — IBUPROFEN 600 MG/1
600 TABLET ORAL EVERY 6 HOURS PRN
Status: DISCONTINUED | OUTPATIENT
Start: 2019-07-16 | End: 2019-07-18

## 2019-07-16 RX ORDER — PANTOPRAZOLE SODIUM 40 MG/1
40 TABLET, DELAYED RELEASE ORAL
Status: DISCONTINUED | OUTPATIENT
Start: 2019-07-16 | End: 2019-07-18

## 2019-07-16 RX ORDER — SODIUM CHLORIDE 0.9 % (FLUSH) 0.9 %
10 SYRINGE (ML) INJECTION EVERY 12 HOURS
Status: DISCONTINUED | OUTPATIENT
Start: 2019-07-16 | End: 2019-07-18

## 2019-07-16 NOTE — PROGRESS NOTES
[id number] [facility] Progress Note    Lethia Velarde Patient Status:  Inpatient    1970 MRN [id number]   [facility] 4NW-A Attending Charles Hinson MD   Hosp Day # 3 PCP Debbie Girard MD         Subjective:  Woo Freed C-Spine 7/15/19:  CONCLUSION:  Posterior disc osteophyte complexes scattered throughout the cervical spine without high-grade spinal canal or neural foraminal stenosis.      Minimal high T2 signal in the right posterior aspect of the cervical spinal cord at Temp Temp src Pulse Resp SpO2   07/16/19 1100 104/69 97.8 °F (36.6 °C) Oral 72 18 100 %   07/16/19 0535 97/54 98.6 °F (37 °C) Oral 79 16 92 %   07/15/19 1950 100/51 98.2 °F (36.8 °C) Oral 78 16 100 %   07/15/19 1715 100/60 — — 66 — —   07/15/19 1630 99/54 11/08/2010    GLUCOSE 79 05/05/2008     Lab Results   Component Value Date    K 3.6 07/15/2019    K 3.3 (L) 07/13/2019    K 3.5 03/23/2019    K 3.5 03/23/2019     Lab Results   Component Value Date    BUN 13 07/15/2019    BUN 12 07/13/2019    BUN 21 (H) 03 and likely incidental but will plan to repeat in future to re-assess      Overall, behaving as neuromuscular junction process with fatigable weakness - presumed antibody negative Myasthenia gravis     This diagnosis is in question however, as she has had n

## 2019-07-16 NOTE — CM/SW NOTE
Pt declined [facility] per [facility].

## 2019-07-16 NOTE — PLAN OF CARE
Problem: Impaired Swallowing  Goal: Minimize aspiration risk  Description  Interventions:  -REGULAR DIET and THIN LIQUIDS  -Select softer foods- avoid thick, dry foods  - Patient should be alert and upright for all feedings (90 degrees preferred)  - Offe

## 2019-07-16 NOTE — HOME CARE LIAISON
Received referral from [facility]. Met with patient at the bedside to discuss home health services. Patient is currently declining services. Updated Philip [facility] CM.

## 2019-07-16 NOTE — PLAN OF CARE
Pt AOx4. Still fatigued at times. Right eye occasionally droopy. Pt states that she is feeling a little stronger in her legs. Day 3/5 of IVIG to be given this afternoon. IVF infusing. VSS. C/o HA pain this morning--declining medications at this time.

## 2019-07-16 NOTE — PROGRESS NOTES
[facility]  Progress Note    Ansley Lee Patient Status:  Inpatient    1970 MRN [id number]   [facility] 4NW-A Attending Jared Connell MD   Hosp Day # 3 PCP Nilesh Boyce MD         SUBJECTIVE:  Subjective:  Spencer New hours. No results for input(s): URINE, CULTI, BLDSMR in the last 168 hours.             Meds:     • Pantoprazole Sodium  40 mg Oral QAM AC   • folic acid  1 mg Oral Daily   • spironolactone  25 mg Oral Daily   • Heparin Sodium (Porcine)  5,000 Units Subc

## 2019-07-16 NOTE — PLAN OF CARE
Pt Aox4 with complaints of headache pain rating at 7-9/10. Tylenol and Sumatriptan given per MAR. Tylenol did not help, however sumatriptan did. Pt had MRI of cervical spine which showed multiple osteophytes and no significant stenosis.  A follow-up MRI rec patient fatigue and changes in neurological status  - Encourage and assist patient to increase activity and self care with guidance from PT/OT  - Encourage visually impaired, hearing impaired and aphasic patients to use assistive/communication devices  Out grooming, and bathing  - Educate and encourage patient/family in tolerated functional activity level and precautions during self-care     Outcome: Progressing     Problem: Impaired Functional Mobility  Goal: Achieve highest/safest level of mobility/gait  D

## 2019-07-16 NOTE — PAYOR COMM NOTE
--------------  CONTINUED STAY REVIEW    Payor: [address]  Subscriber #:  [id number]  Authorization Number: [id number]     Admit date: 7/13/19  Admit time: 65    Admitting Physician: Vipul Damon MD  Attending Physician:   Vipul Damon feels stronger. She notes mild improvement with the IVIG. Has moderate HA/migraine, somewhat relieved by triptan.      MRI C-Spine 7/15/19:  CONCLUSION:  Posterior disc osteophyte complexes scattered throughout the cervical spine without high-grade spina

## 2019-07-17 LAB
ALBUMIN SERPL-MCNC: 2.9 G/DL (ref 3.4–5)
ALBUMIN/GLOB SERPL: 0.6 {RATIO} (ref 1–2)
ALP LIVER SERPL-CCNC: 67 U/L (ref 39–100)
ALT SERPL-CCNC: 20 U/L (ref 13–56)
ANION GAP SERPL CALC-SCNC: 4 MMOL/L (ref 0–18)
AST SERPL-CCNC: 22 U/L (ref 15–37)
BASOPHILS # BLD AUTO: 0.03 X10(3) UL (ref 0–0.2)
BASOPHILS NFR BLD AUTO: 0.9 %
BILIRUB SERPL-MCNC: 0.3 MG/DL (ref 0.1–2)
BUN BLD-MCNC: 14 MG/DL (ref 7–18)
BUN/CREAT SERPL: 21.2 (ref 10–20)
CALCIUM BLD-MCNC: 8.9 MG/DL (ref 8.5–10.1)
CHLORIDE SERPL-SCNC: 116 MMOL/L (ref 98–112)
CO2 SERPL-SCNC: 22 MMOL/L (ref 21–32)
CREAT BLD-MCNC: 0.66 MG/DL (ref 0.55–1.02)
DEPRECATED RDW RBC AUTO: 40.6 FL (ref 35.1–46.3)
EOSINOPHIL # BLD AUTO: 0.24 X10(3) UL (ref 0–0.7)
EOSINOPHIL NFR BLD AUTO: 7.1 %
ERYTHROCYTE [DISTWIDTH] IN BLOOD BY AUTOMATED COUNT: 12.6 % (ref 11–15)
GLOBULIN PLAS-MCNC: 4.5 G/DL (ref 2.8–4.4)
GLUCOSE BLD-MCNC: 89 MG/DL (ref 70–99)
HCT VFR BLD AUTO: 39.9 % (ref 35–48)
HGB BLD-MCNC: 13.2 G/DL (ref 12–16)
IMM GRANULOCYTES # BLD AUTO: 0 X10(3) UL (ref 0–1)
IMM GRANULOCYTES NFR BLD: 0 %
LYMPHOCYTES # BLD AUTO: 1.5 X10(3) UL (ref 1–4)
LYMPHOCYTES NFR BLD AUTO: 44.2 %
M PROTEIN MFR SERPL ELPH: 7.4 G/DL (ref 6.4–8.2)
MCH RBC QN AUTO: 29.4 PG (ref 26–34)
MCHC RBC AUTO-ENTMCNC: 33.1 G/DL (ref 31–37)
MCV RBC AUTO: 88.9 FL (ref 80–100)
MONOCYTES # BLD AUTO: 0.33 X10(3) UL (ref 0.1–1)
MONOCYTES NFR BLD AUTO: 9.7 %
NEUTROPHILS # BLD AUTO: 1.29 X10 (3) UL (ref 1.5–7.7)
NEUTROPHILS # BLD AUTO: 1.29 X10(3) UL (ref 1.5–7.7)
NEUTROPHILS NFR BLD AUTO: 38.1 %
OSMOLALITY SERPL CALC.SUM OF ELEC: 294 MOSM/KG (ref 275–295)
PLATELET # BLD AUTO: 169 10(3)UL (ref 150–450)
POTASSIUM SERPL-SCNC: 3.7 MMOL/L (ref 3.5–5.1)
RBC # BLD AUTO: 4.49 X10(6)UL (ref 3.8–5.3)
SODIUM SERPL-SCNC: 142 MMOL/L (ref 136–145)
WBC # BLD AUTO: 3.4 X10(3) UL (ref 4–11)

## 2019-07-17 PROCEDURE — 99233 SBSQ HOSP IP/OBS HIGH 50: CPT | Performed by: OTHER

## 2019-07-17 NOTE — PROGRESS NOTES
[id number] [facility] Progress Note    Glendy Tapia Patient Status:  Inpatient    1970 MRN [id number]   [facility] 4NW-A Attending Brandin Moore MD   Hosp Day # 4 PCP Mamta Case MD         Subjective:  Joan Calderon Nightly       Imaging/Diagnostics:  EMG previously showed some polyphasic potentials in biceps and brachioradialis bilaterally    MRI C-Spine 7/15/19:  CONCLUSION:  Posterior disc osteophyte complexes scattered throughout the cervical spine without high-gr focal numbness/tingling/weakness, n/v, chest pain but easily fatigued    O:  Exam  BP 90/55 (BP Location: Right arm)   Pulse 80   Temp 98 °F (36.7 °C) (Oral)   Resp 16   Ht 66\"   Wt 168 lb 9.6 oz   SpO2 99%   BMI 27.21 kg/m²     Patient Vitals for the pas No enhancing lesions are identified.    Dictated by: Kareen Stock MD on 7/15/2019 at 22:02     Approved by: Kareen Stock MD              Labs:   BMP:     Lab Results   Component Value Date    GLUCOSE 84 06/16/2011    GLUCOSE 77 11/08/2010    GLU biceps and brachioradialis bilaterally     MRI cervical spine was done with no significant stenosis - incidentally noted small faint T2/FLAIR hyperintensity in R posterior cord at C5 level isolated finding and likely incidental but will plan to repeat in f

## 2019-07-17 NOTE — PROGRESS NOTES
Cont to have head but good relief after motrin/imitrx. Pain went from 10 to 6. Nausea before lunch resolved after zofran. IVIG completed. No adverse reaction noted.

## 2019-07-17 NOTE — PROGRESS NOTES
[facility]  Progress Note    Miki Rangel Patient Status:  Inpatient    1970 MRN [id number]   [facility] 4NW-A Attending Yanick Goetz MD   Hosp Day # 4 PCP Jennifer Kilgore MD         SUBJECTIVE:  Subjective:  Read Kvng No results for input(s): PGLU in the last 168 hours. No results for input(s): URINE, CULTI, BLDSMR in the last 168 hours.             Meds:     • Pantoprazole Sodium  40 mg Oral QAM AC   • Normal Saline Flush  10 mL Intravenous H50C   • folic acid

## 2019-07-17 NOTE — PHYSICAL THERAPY NOTE
PHYSICAL THERAPY TREATMENT NOTE - INPATIENT    Room Number: 596/787-I     Session: 1   Number of Visits to Meet Established Goals: 5    Presenting Problem:  B LE weakness;myasthenia gravis    History related to current admission: pt diagnosed with myasthen Good  Dynamic Sitting: Good           Static Standing: Fair -  Dynamic Standing: Fair -    ACTIVITY TOLERANCE                         O2 WALK                    AM-PAC '6-Clicks' INPATIENT SHORT FORM - BASIC MOBILITY  How much difficulty does the patient c able to remain on first floor of home. Pt returned to sit BS chair c supervision. Pt performed LAQ BLE 5x each, decreased ext RLE 2/2 pain and weakness. Pt left in chair, needs met, encouraged to change positions frequently 2/2 back pain.   Pt educated o 7/15/2019              Progressing

## 2019-07-17 NOTE — PLAN OF CARE
Pt Aox4 with complaints of moderate headache pain that progressed to severe. Pt refused sumatriptan and only wanted motrin. Per pt she only likes to take 9 pills of sumatriptan per month. Pt also finding some relief for her headache with a heat pack.  Pt up airway, patient safety  and administer oxygen as ordered  - Monitor patient for seizure activity, document and report duration and description of seizure to MD/LIP  - If seizure occurs, turn patient to side and suction secretions as needed  - Reorient bri Progressing     Problem: Impaired Functional Mobility  Goal: Achieve highest/safest level of mobility/gait  Description  Interventions:  - Assess patient's functional ability and stability  - Promote increasing activity/tolerance for mobility and gait  - E

## 2019-07-18 VITALS
DIASTOLIC BLOOD PRESSURE: 60 MMHG | BODY MASS INDEX: 27.1 KG/M2 | TEMPERATURE: 98 F | HEIGHT: 66 IN | HEART RATE: 60 BPM | OXYGEN SATURATION: 100 % | RESPIRATION RATE: 16 BRPM | SYSTOLIC BLOOD PRESSURE: 92 MMHG | WEIGHT: 168.63 LBS

## 2019-07-18 LAB
BASOPHILS # BLD AUTO: 0.04 X10(3) UL (ref 0–0.2)
BASOPHILS NFR BLD AUTO: 0.9 %
DEPRECATED RDW RBC AUTO: 40.5 FL (ref 35.1–46.3)
EOSINOPHIL # BLD AUTO: 0.28 X10(3) UL (ref 0–0.7)
EOSINOPHIL NFR BLD AUTO: 6.3 %
ERYTHROCYTE [DISTWIDTH] IN BLOOD BY AUTOMATED COUNT: 12.6 % (ref 11–15)
HCT VFR BLD AUTO: 37.6 % (ref 35–48)
HGB BLD-MCNC: 12.6 G/DL (ref 12–16)
IMM GRANULOCYTES # BLD AUTO: 0.01 X10(3) UL (ref 0–1)
IMM GRANULOCYTES NFR BLD: 0.2 %
LYMPHOCYTES # BLD AUTO: 1.73 X10(3) UL (ref 1–4)
LYMPHOCYTES NFR BLD AUTO: 39.2 %
MCH RBC QN AUTO: 29.6 PG (ref 26–34)
MCHC RBC AUTO-ENTMCNC: 33.5 G/DL (ref 31–37)
MCV RBC AUTO: 88.5 FL (ref 80–100)
MONOCYTES # BLD AUTO: 0.51 X10(3) UL (ref 0.1–1)
MONOCYTES NFR BLD AUTO: 11.6 %
NEUTROPHILS # BLD AUTO: 1.84 X10 (3) UL (ref 1.5–7.7)
NEUTROPHILS # BLD AUTO: 1.84 X10(3) UL (ref 1.5–7.7)
NEUTROPHILS NFR BLD AUTO: 41.8 %
PLATELET # BLD AUTO: 182 10(3)UL (ref 150–450)
RBC # BLD AUTO: 4.25 X10(6)UL (ref 3.8–5.3)
WBC # BLD AUTO: 4.4 X10(3) UL (ref 4–11)

## 2019-07-18 PROCEDURE — 99233 SBSQ HOSP IP/OBS HIGH 50: CPT | Performed by: OTHER

## 2019-07-18 NOTE — PROGRESS NOTES
NURSING DISCHARGE NOTE    Discharged Home via Ambulatory. Accompanied by Family member  Belongings Taken by patient/family. Pt assessed and ready for discharge. Discharge instructions and medications given and reviewed.   All questions and concerns

## 2019-07-18 NOTE — PROGRESS NOTES
[facility]  Progress Note    Aure Nicholas Patient Status:  Inpatient    1970 MRN [id number]   [facility] 4NW-A Attending Jen Wise MD   Hosp Day # 5 PCP Avery Glass MD         SUBJECTIVE:  Subjective:  Price Grey 12* 22   ALB 3.8 3.3* 2.9*       No results for input(s): PGLU in the last 168 hours. No results for input(s): URINE, CULTI, BLDSMR in the last 168 hours.             Meds:     • Pantoprazole Sodium  40 mg Oral QAM AC   • Normal Saline Flush  10 mL Intra

## 2019-07-18 NOTE — PROGRESS NOTES
[id number] [facility] Progress Note    Bethanie Zhu Patient Status:  Inpatient    1970 MRN [id number]   [facility] 4NW-A Attending Damián Manriquez MD   Hosp Day # 5 PCP Carol Juan MD         Subjective:  Laura House topiramate  100 mg Oral BID   • levETIRAcetam  500 mg Oral Daily   • levETIRAcetam  1,000 mg Oral Nightly       Patient Active Problem List:     Hypopotassemia     Lumbago     Cervical radiculopathy     Lumbar radiculopathy     Sacroiliac dysfunction     A [address] [facility]  7/18/2019  8:19 AM  Spectra [id number]    Neurology Attending Addendum:  I have seen independently, reviewed history, labs and imaging independent of NP and agree with above note with following additions:   S: no acute issues overnight; 07/15/2019    K 3.3 (L) 07/13/2019     Lab Results   Component Value Date    BUN 14 07/17/2019    BUN 13 07/15/2019    BUN 12 07/13/2019     Lab Results   Component Value Date    CREATSERUM 0.66 07/17/2019    CREATSERUM 0.63 07/15/2019    CREATSERUM 0.70 0 weakness - presumed antibody negative Myasthenia gravis     This diagnosis is in question however, as she has had negative antibody testing, negative repetitive nerve testing in the past and negative evaluation by neuro-ophthalmology     She does appear to

## 2019-07-18 NOTE — PROGRESS NOTES
After IVIG complete ambulated in diaz w/walker. C/o RLE pain and tingling bilat after starting to walk.

## 2019-07-18 NOTE — PROGRESS NOTES
07/18/19 0956   Spontaneous Parameters   $ Spontaneous Vital Capacity 2.43  (Liters)   Negative Inspiratory Force -30

## 2019-07-18 NOTE — PAYOR COMM NOTE
--------------  CONTINUED STAY REVIEW    Payor: [address]  Subscriber #:  [id number]  Authorization Number: [id number]     Admit date: 7/13/19  Admit time: 65    Admitting Physician: Bryanna Sanchez MD  Attending Physician:   Bryanna Sanchez

## 2019-07-18 NOTE — PLAN OF CARE
Pt Aox4 with complaints of headache pain of 8/10. Pt took motrin and imitrex in addition to her night time medications. Pt ambulating halls with assist at the start of shift. Pt appears better than she has the last two days.  Pt up in the bathroom to brush guidance from PT/OT  - Encourage visually impaired, hearing impaired and aphasic patients to use assistive/communication devices  Outcome: Progressing     Problem: PAIN - ADULT  Goal: Verbalizes/displays adequate comfort level or patient's stated pain goal self-care     Outcome: Progressing     Problem: Impaired Functional Mobility  Goal: Achieve highest/safest level of mobility/gait  Description  Interventions:  - Assess patient's functional ability and stability  - Promote increasing activity/tolerance for

## 2019-07-24 ENCOUNTER — PROCEDURE VISIT (OUTPATIENT)
Dept: NEUROLOGY | Facility: CLINIC | Age: 49
End: 2019-07-24
Payer: COMMERCIAL

## 2019-07-24 DIAGNOSIS — M62.81 MUSCLE WEAKNESS: Primary | ICD-10-CM

## 2019-07-24 PROCEDURE — 95909 NRV CNDJ TST 5-6 STUDIES: CPT | Performed by: OTHER

## 2019-07-24 PROCEDURE — 95886 MUSC TEST DONE W/N TEST COMP: CPT | Performed by: OTHER

## 2019-07-25 NOTE — PROCEDURES
[facility]  [address] [place], [address]  Ph: [phone]  FAX: [phone]        Full Name: Deisi Area Gender: Female  Patient ID: [id number] YOB: 1970      Visit Date: 7/24/2019  ms ms ms   R Peroneal - EDB 46.0 4.8 41.3   R Tibial - AH 54.6 4.8 49.7   L Tibial - AH 50.8 4.3 46.5   L Peroneal - EDB 46.9 5.2 41.8       EMG         EMG Summary Table     Spontaneous MUAP Recruitment   Muscle Nerve Roots IA Fib PSW Fasc H.F.  Amp Dur. This is a normal study. There is no electrophysiologic evidence to suggest an underlying large fiber polyneuropathy, myopathy, or atypical neuropathy, or active denervation changes, as clinically questioned. No myotonic discharges were noted.   Of note, t

## 2019-07-29 ENCOUNTER — TELEPHONE (OUTPATIENT)
Dept: NEUROLOGY | Facility: CLINIC | Age: 49
End: 2019-07-29

## 2019-07-29 NOTE — TELEPHONE ENCOUNTER
Attempted to call patient back. No answer on preferred phone number. Chandler green  [address]  [address]  [place] [address]  Phone: [phone]  Fax: (   )    -

## 2019-07-29 NOTE — TELEPHONE ENCOUNTER
S: Sick Call    B: MG patient.      ZZR:2/71/92  Piter Yates is a 51year old woman with PMHx significant for  pseudoseizures, who presents for evaluation of mainly R eye ptosis      Exam shows fatigable weakness with ptosis in R more than L eye wi

## 2019-07-29 NOTE — TELEPHONE ENCOUNTER
Called patient and informed her that per note below, patient should go to ER with new or worsening symptoms, increased SOB or increased weakness. Patient VU.     Patient has been referred to Dr. Paula Shelley for second opinion and has scheduled 9/12/2019 appoin

## 2019-07-29 NOTE — TELEPHONE ENCOUNTER
Monitor for worsening symptoms; if worsening shortness of breath or weakness, go to ER for evaluation .

## 2019-07-29 NOTE — TELEPHONE ENCOUNTER
Called patient and left detailed message on verified voicemail (OK per HIPPA) informing her of the following information:    Ricardo De Leon MD      7/29/19 2:12 PM   Note      Monitor for worsening symptoms; if worsening shortness of breath or weakness,

## 2019-07-29 NOTE — TELEPHONE ENCOUNTER
Sick transferred from [address].     S: Patient reports tingling in toes in both feet, right arm tingling, severe upper back pain, right eyelid drooping, increased fatigue and muscle twitching in left calf after traveling to [place], [place]

## 2019-08-06 ENCOUNTER — TELEPHONE (OUTPATIENT)
Dept: NEUROLOGY | Facility: CLINIC | Age: 49
End: 2019-08-06

## 2019-08-10 NOTE — DISCHARGE SUMMARY
[facility]  Discharge Summary    Estela Olivarez Patient Status:  Inpatient    1970 MRN [id number]   [facility] 4NW-A Attending No att. providers found   [facility] Day # 5 PCP Nickolas Sandhu MD     Date of Admission: 2019 1556      Gross per 24 hour   Intake 1565 ml   Output —   Net 1565 ml         Exam  General Appearance:    Alert, cooperative, no distress, appears stated age   Head:    Normocephalic,  atraumatic   Neck:   Supple, symmetrical, trachea midline,        thyr Sulfate HFA, SUMAtriptan Succinate, acetaminophen, ondansetron HCl         Assessment/Plan:      Principal Problem:    Myasthenia gravis ([facility] [address]              Plan:  Continue present management,  1.    AB NEG  myasthenia gravis with possible exacerbation.  Pl Tab  Take 100 mg by mouth 2 (two) times daily. , Historical    Albuterol Sulfate HFA (VENTOLIN) 108 (90 BASE) MCG/ACT Inhalation Aero Soln  Inhale 2 puffs into the lungs every 6 (six) hours as needed for Wheezing., Historical          Follow up Visits:  Fo

## 2019-08-16 NOTE — TELEPHONE ENCOUNTER
No further contact needed as patient states she will update office and no new notes from provider. Encounter closed.

## 2019-09-09 ENCOUNTER — LAB ENCOUNTER (OUTPATIENT)
Dept: LAB | Age: 49
End: 2019-09-09
Attending: Other
Payer: COMMERCIAL

## 2019-09-09 ENCOUNTER — OFFICE VISIT (OUTPATIENT)
Dept: NEUROLOGY | Facility: CLINIC | Age: 49
End: 2019-09-09
Payer: COMMERCIAL

## 2019-09-09 VITALS
SYSTOLIC BLOOD PRESSURE: 122 MMHG | DIASTOLIC BLOOD PRESSURE: 74 MMHG | RESPIRATION RATE: 20 BRPM | BODY MASS INDEX: 27 KG/M2 | WEIGHT: 166 LBS | HEART RATE: 72 BPM

## 2019-09-09 DIAGNOSIS — R29.898 WEAKNESS OF BOTH LOWER EXTREMITIES: Primary | ICD-10-CM

## 2019-09-09 DIAGNOSIS — H02.403 PTOSIS OF BOTH EYELIDS: ICD-10-CM

## 2019-09-09 DIAGNOSIS — R13.10 DYSPHAGIA, UNSPECIFIED TYPE: ICD-10-CM

## 2019-09-09 DIAGNOSIS — R29.898 WEAKNESS OF BOTH LOWER EXTREMITIES: ICD-10-CM

## 2019-09-09 PROCEDURE — 99215 OFFICE O/P EST HI 40 MIN: CPT | Performed by: OTHER

## 2019-09-09 NOTE — PROGRESS NOTES
[facility]  Neurology - Clinic Follow up  2019, 9:20 AM     Miki Rangel Patient Status:  No patient class for patient encounter    1970 MRN [id number]   Location [unfilled] PCP Jennifer Kilgore MD -30 VC 1.89/1.95    Component      Latest Ref Rng & Units 4/13/2019 2/25/2019 7/24/2014   ACETYLCHOLINE BINDING AB      0.0 - 0.4 nmol/L 0.0 0.0    ACETYLCHOLINE BLOCKING AB      0 - 26 % 5 14    Striated Muscle Ab, IgG Screen      <1:40 <1:40     TITIN AN every 6 (six) hours as needed for Wheezing., Disp: , Rfl:   •  Potassium Chloride ER 20 MEQ Oral Tab CR, Take 20 mEq by mouth 2 (two) times daily. , Disp: , Rfl:   •  folic acid 1 MG Oral Tab, Take 1 tablet (1 mg total) by mouth daily. , Disp: 30 tablet, Rfl resistance, DF/PF 5/5  Sensory: Light touch, pin and vibration intact, position sense is normal;  DTRs   Symmetric 2/4 in all limbs,   No Babinski sign,   COORDINATION: Normal FTN and HTS tests,   GAIT:  Normal gait, can do  tandem walk, romberg test is ne

## 2019-10-01 ENCOUNTER — TELEPHONE (OUTPATIENT)
Dept: NEUROLOGY | Facility: CLINIC | Age: 49
End: 2019-10-01

## 2019-10-01 NOTE — TELEPHONE ENCOUNTER
Pt called in and states that she has been experiencing increased shortness of breath for two days with and without exertion, pt states she is also experiencing increased weakness, and drooping of eyelid. Pt further states that her voice feels \"heavy. \"  W

## 2019-10-01 NOTE — TELEPHONE ENCOUNTER
Outpatient EMG still pending, diagnosis unclear. If having difficulty breathing, going to ED for evaluation is recommended.

## 2019-10-21 DIAGNOSIS — G40.909 SEIZURE DISORDER (HCC): Primary | ICD-10-CM

## 2019-10-23 RX ORDER — LEVETIRACETAM 500 MG/1
TABLET ORAL
Qty: 90 TABLET | Refills: 5 | Status: SHIPPED | OUTPATIENT
Start: 2019-10-23 | End: 2020-05-27

## 2019-10-23 RX ORDER — SUMATRIPTAN 100 MG/1
TABLET, FILM COATED ORAL
Qty: 9 TABLET | Refills: 5 | Status: SHIPPED | OUTPATIENT
Start: 2019-10-23 | End: 2021-10-06

## 2019-10-23 NOTE — TELEPHONE ENCOUNTER
Medication: Sumatriptan 100 mg     Date of last refill: 08/29/2018 with 5 addt refills additional refills  [place]     Last office visit: 9/9/19   Due back to clinic per last office note:  6 weeks  Date next office v

## 2019-10-23 NOTE — TELEPHONE ENCOUNTER
Pts  called and trying to get the sumatriptan refilled from Dr. Bryce Hughes. Currently, pt seeing Dr. Rick Michel for Myastenia Gravis but also sees Dr. Bryce Hughes for seizures.     Pt is out of medication and needs it refilled today from Dr. Bryce Hughes an

## 2019-10-29 ENCOUNTER — OFFICE VISIT (OUTPATIENT)
Dept: ELECTROPHYSIOLOGY | Facility: HOSPITAL | Age: 49
End: 2019-10-29
Attending: Other
Payer: COMMERCIAL

## 2019-10-29 DIAGNOSIS — R29.898 WEAKNESS OF BOTH LOWER EXTREMITIES: ICD-10-CM

## 2019-10-29 DIAGNOSIS — H02.403 PTOSIS OF BOTH EYELIDS: ICD-10-CM

## 2019-10-29 PROCEDURE — 95885 MUSC TST DONE W/NERV TST LIM: CPT | Performed by: OTHER

## 2019-10-29 PROCEDURE — 95937 NEUROMUSCULAR JUNCTION TEST: CPT | Performed by: OTHER

## 2019-10-29 PROCEDURE — 95910 NRV CNDJ TEST 7-8 STUDIES: CPT | Performed by: OTHER

## 2019-10-29 NOTE — PROCEDURES
[facility]  Nerve Conduction & EMG Report                      Patrice Baldwin, [address]   EMG department   [address]  [place], [address] [place] [address]  [phone]          Patient name: Kovacs Marko  Date of b

## 2019-10-31 ENCOUNTER — OFFICE VISIT (OUTPATIENT)
Dept: NEUROLOGY | Facility: CLINIC | Age: 49
End: 2019-10-31
Payer: COMMERCIAL

## 2019-10-31 VITALS
BODY MASS INDEX: 26 KG/M2 | SYSTOLIC BLOOD PRESSURE: 130 MMHG | DIASTOLIC BLOOD PRESSURE: 60 MMHG | RESPIRATION RATE: 16 BRPM | HEART RATE: 80 BPM | WEIGHT: 164 LBS

## 2019-10-31 DIAGNOSIS — G40.909 SEIZURE DISORDER (HCC): ICD-10-CM

## 2019-10-31 DIAGNOSIS — R13.10 DYSPHAGIA, UNSPECIFIED TYPE: ICD-10-CM

## 2019-10-31 DIAGNOSIS — M62.81 GENERALIZED MUSCLE WEAKNESS: Primary | ICD-10-CM

## 2019-10-31 DIAGNOSIS — G43.109 MIGRAINE WITH AURA AND WITHOUT STATUS MIGRAINOSUS, NOT INTRACTABLE: ICD-10-CM

## 2019-10-31 PROCEDURE — 99213 OFFICE O/P EST LOW 20 MIN: CPT | Performed by: OTHER
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
## 2020-01-23 ENCOUNTER — TELEPHONE (OUTPATIENT)
Dept: NEUROLOGY | Facility: CLINIC | Age: 50
End: 2020-01-23

## 2020-01-23 NOTE — TELEPHONE ENCOUNTER
S: Numbness and tingling in legs. B: Dx: Cervical and Lumber Radiculopathy    A:  Pt is having pain in her upper back and Numbness is her legs. R:  Will route to MD for recommendation.     RN spoke to MD and asked if we could make an appt for her ulises

## 2020-01-24 ENCOUNTER — OFFICE VISIT (OUTPATIENT)
Dept: NEUROLOGY | Facility: CLINIC | Age: 50
End: 2020-01-24
Payer: COMMERCIAL

## 2020-01-24 VITALS
HEIGHT: 66 IN | SYSTOLIC BLOOD PRESSURE: 111 MMHG | DIASTOLIC BLOOD PRESSURE: 87 MMHG | RESPIRATION RATE: 16 BRPM | BODY MASS INDEX: 26.36 KG/M2 | HEART RATE: 74 BPM | WEIGHT: 164 LBS

## 2020-01-24 DIAGNOSIS — M62.81 GENERALIZED MUSCLE WEAKNESS: ICD-10-CM

## 2020-01-24 DIAGNOSIS — R29.898 WEAKNESS OF BOTH LOWER EXTREMITIES: Primary | ICD-10-CM

## 2020-01-24 DIAGNOSIS — R13.10 DYSPHAGIA, UNSPECIFIED TYPE: ICD-10-CM

## 2020-01-24 DIAGNOSIS — G04.91 MYELITIS (HCC): ICD-10-CM

## 2020-01-24 PROCEDURE — 99214 OFFICE O/P EST MOD 30 MIN: CPT | Performed by: OTHER

## 2020-01-24 RX ORDER — TOPIRAMATE 100 MG/1
100 TABLET, FILM COATED ORAL 2 TIMES DAILY
COMMUNITY
End: 2021-02-09

## 2020-01-24 RX ORDER — HYDROCODONE BITARTRATE AND ACETAMINOPHEN 5; 325 MG/1; MG/1
1 TABLET ORAL AS NEEDED
Status: ON HOLD | COMMUNITY
End: 2020-06-11

## 2020-01-24 RX ORDER — BACLOFEN 20 MG/1
20 TABLET ORAL 3 TIMES DAILY
COMMUNITY
End: 2020-06-15

## 2020-01-24 NOTE — PROGRESS NOTES
[facility] with [address]  7/2/1970  Primary Care Provider:  Sascha Nieto MD    1/24/2020  Accompanied visit:     (x) No.        47year old yo patient being seen for: Sitting, Cuff Size: adult)   Pulse 74   Resp 16   Ht 66\"   Wt 164 lb (74.4 kg)   BMI 26.47 kg/m²   Looks stated age  Pink conjunctiva anicteric sclerae, moist mucosa  No LAD, neck supple  Lungs clear breath sounds  Heart S1S2, no abnormal sounds  Pink iman - 1  ( x) Images & studies independently reviewed -non F2F  (  ) Case/studies discussed with other caregivers - -non F2F  (  ) Telephone time with patiern or authorized Fam member--non F2F  ( x ) other records reviewed --non F2F including consultations  (

## 2020-01-29 ENCOUNTER — TELEPHONE (OUTPATIENT)
Dept: NEUROLOGY | Facility: CLINIC | Age: 50
End: 2020-01-29

## 2020-01-29 RX ORDER — PREDNISONE 10 MG/1
10 TABLET ORAL DAILY
Qty: 30 TABLET | Refills: 2 | Status: SHIPPED | OUTPATIENT
Start: 2020-01-29 | End: 2020-06-15

## 2020-01-29 NOTE — TELEPHONE ENCOUNTER
Current Outpatient Medications:   •  topiramate 100 MG Oral Tab, Take 100 mg by mouth 2 (two) times daily. , Disp: , Rfl:   •  baclofen 20 MG Oral Tab, Take 20 mg by mouth 3 (three) times daily. , Disp: , Rfl:   •  HYDROcodone-acetaminophen 5-325 MG Oral T

## 2020-01-29 NOTE — TELEPHONE ENCOUNTER
S: patient is having neck spasm    B: LOV: 01/24/2020    A: patient states her neck pain has been worst from 01/24. Neck is getting pulled back. She is also having numbness in her legs and whole face.      R: Will notify Dr Bryce Hughes and ask for recommendat

## 2020-02-06 ENCOUNTER — HOSPITAL ENCOUNTER (OUTPATIENT)
Dept: MRI IMAGING | Facility: HOSPITAL | Age: 50
Discharge: HOME | End: 2020-02-06
Attending: Other
Payer: COMMERCIAL

## 2020-02-06 DIAGNOSIS — M62.81 GENERALIZED MUSCLE WEAKNESS: ICD-10-CM

## 2020-02-06 DIAGNOSIS — G04.91 MYELITIS (HCC): ICD-10-CM

## 2020-02-06 DIAGNOSIS — R29.898 WEAKNESS OF BOTH LOWER EXTREMITIES: ICD-10-CM

## 2020-02-06 PROCEDURE — 72156 MRI NECK SPINE W/O & W/DYE: CPT | Performed by: OTHER

## 2020-02-06 PROCEDURE — A9575 INJ GADOTERATE MEGLUMI 0.1ML: HCPCS | Performed by: OTHER

## 2020-02-17 ENCOUNTER — HOSPITAL ENCOUNTER (OUTPATIENT)
Dept: BONE DENSITY | Age: 50
Discharge: HOME | End: 2020-02-17
Attending: SPECIALIST
Payer: COMMERCIAL

## 2020-02-17 DIAGNOSIS — M81.0 OSTEOPOROSIS: ICD-10-CM

## 2020-02-17 PROCEDURE — 77080 DXA BONE DENSITY AXIAL: CPT | Performed by: SPECIALIST

## 2020-03-03 ENCOUNTER — HOSPITAL ENCOUNTER (OUTPATIENT)
Dept: GENERAL RADIOLOGY | Facility: HOSPITAL | Age: 50
Discharge: HOME | End: 2020-03-03
Attending: SPECIALIST
Payer: COMMERCIAL

## 2020-03-03 DIAGNOSIS — R07.9 CHEST PAIN: ICD-10-CM

## 2020-03-03 DIAGNOSIS — R05.9 COUGH: ICD-10-CM

## 2020-03-03 PROCEDURE — 71100 X-RAY EXAM RIBS UNI 2 VIEWS: CPT | Performed by: SPECIALIST

## 2020-03-03 PROCEDURE — 71046 X-RAY EXAM CHEST 2 VIEWS: CPT | Performed by: SPECIALIST

## 2020-04-24 ENCOUNTER — VIRTUAL PHONE E/M (OUTPATIENT)
Dept: NEUROLOGY | Facility: CLINIC | Age: 50
End: 2020-04-24
Payer: COMMERCIAL

## 2020-04-24 DIAGNOSIS — R20.2 NUMBNESS AND TINGLING OF RIGHT ARM: ICD-10-CM

## 2020-04-24 DIAGNOSIS — G40.909 SEIZURE DISORDER (HCC): Primary | ICD-10-CM

## 2020-04-24 DIAGNOSIS — G04.91 MYELITIS (HCC): ICD-10-CM

## 2020-04-24 DIAGNOSIS — R29.898 WEAKNESS OF LEFT ARM: ICD-10-CM

## 2020-04-24 DIAGNOSIS — H02.403 DROOPING EYELID DISEASE, BILATERAL: ICD-10-CM

## 2020-04-24 DIAGNOSIS — R20.0 NUMBNESS AND TINGLING OF RIGHT ARM: ICD-10-CM

## 2020-04-24 DIAGNOSIS — F44.5 PSEUDOSEIZURE: ICD-10-CM

## 2020-04-24 PROCEDURE — 99213 OFFICE O/P EST LOW 20 MIN: CPT | Performed by: OTHER

## 2020-04-24 NOTE — PROGRESS NOTES
[id number] [facility] with [facility]  4/24/2020    Time note ststarted 11:12 AM    Telephone Encounter after patient consenting to process and charges.     47year old female whom we follow for:  Multiple symptom MORNING AND 2 TABLETS EVERY NIGHT AT BEDTIME, Disp: 90 tablet, Rfl: 5  •  SUMATRIPTAN SUCCINATE 100 MG Oral Tab, TAKE 1 TABLET BY MOUTH DAILY AS NEEDED FOR MIGRAINE., Disp: 9 tablet, Rfl: 5  •  Potassium Chloride ER 20 MEQ Oral Tab CR, Take 20 mEq by mouth

## 2020-04-24 NOTE — PROGRESS NOTES
Virtual Telephone Check-In    [facility] verbally consents to a Virtual/Telephone Check-In visit on 04/24/20.     Patient understands and accepts financial responsibility for any deductible, co-insurance and/or co-pays associated with this service

## 2020-05-26 DIAGNOSIS — G40.909 SEIZURE DISORDER (HCC): ICD-10-CM

## 2020-05-26 NOTE — TELEPHONE ENCOUNTER
Medication: LEVETIRACETAM 500 MG Oral Tab    Date of last refill: 10/23/2019 (#90/5)  [place]    Last office visit: 4/24/2020  Due back to clinic per last office note:    Date next office visit scheduled:  No future

## 2020-05-27 RX ORDER — LEVETIRACETAM 500 MG/1
TABLET ORAL
Qty: 90 TABLET | Refills: 5 | Status: SHIPPED | OUTPATIENT
Start: 2020-05-27 | End: 2020-11-23

## 2020-06-03 ENCOUNTER — TELEPHONE (OUTPATIENT)
Dept: ORTHOPEDICS CLINIC | Facility: CLINIC | Age: 50
End: 2020-06-03

## 2020-06-03 ENCOUNTER — OFFICE VISIT (OUTPATIENT)
Dept: ORTHOPEDICS CLINIC | Facility: CLINIC | Age: 50
End: 2020-06-03
Payer: COMMERCIAL

## 2020-06-03 VITALS
BODY MASS INDEX: 26.36 KG/M2 | HEART RATE: 102 BPM | RESPIRATION RATE: 16 BRPM | WEIGHT: 164 LBS | SYSTOLIC BLOOD PRESSURE: 105 MMHG | HEIGHT: 66 IN | DIASTOLIC BLOOD PRESSURE: 62 MMHG | OXYGEN SATURATION: 98 %

## 2020-06-03 DIAGNOSIS — M25.412 PAIN AND SWELLING OF LEFT SHOULDER: ICD-10-CM

## 2020-06-03 DIAGNOSIS — M75.02 ADHESIVE CAPSULITIS OF LEFT SHOULDER: Primary | ICD-10-CM

## 2020-06-03 DIAGNOSIS — M25.512 PAIN AND SWELLING OF LEFT SHOULDER: ICD-10-CM

## 2020-06-03 PROCEDURE — 20610 DRAIN/INJ JOINT/BURSA W/O US: CPT | Performed by: NURSE PRACTITIONER

## 2020-06-03 PROCEDURE — 99203 OFFICE O/P NEW LOW 30 MIN: CPT | Performed by: NURSE PRACTITIONER

## 2020-06-03 RX ORDER — TRIAMCINOLONE ACETONIDE 40 MG/ML
40 INJECTION, SUSPENSION INTRA-ARTICULAR; INTRAMUSCULAR ONCE
Status: COMPLETED | OUTPATIENT
Start: 2020-06-03 | End: 2020-06-03

## 2020-06-03 RX ORDER — DICLOFENAC SODIUM 75 MG/1
75 TABLET, DELAYED RELEASE ORAL 2 TIMES DAILY
Qty: 60 TABLET | Refills: 1 | Status: ON HOLD | OUTPATIENT
Start: 2020-06-03 | End: 2020-06-11

## 2020-06-03 RX ADMIN — TRIAMCINOLONE ACETONIDE 40 MG: 40 INJECTION, SUSPENSION INTRA-ARTICULAR; INTRAMUSCULAR at 11:25:00

## 2020-06-03 NOTE — H&P
[facility] New Patient Note    CC: Patient presents with:  Consult: left shoulder pain x 2 months. denies injury. right hand dominant. posterior pain. denies numbness. pain with ROM. sharp pain. OTC Ibuprofen- helped some.   previous PT- did no BEDTIME 90 tablet 5   • predniSONE 10 MG Oral Tab Take 1 tablet (10 mg total) by mouth daily. 30 tablet 2   • topiramate 100 MG Oral Tab Take 100 mg by mouth 2 (two) times daily. • baclofen 20 MG Oral Tab Take 20 mg by mouth 3 (three) times daily. the report with her that was negative for any acute fracture or acute process.       ASSESSMENT/PLAN  Diagnoses and all orders for this visit:    Adhesive capsulitis of left shoulder  -     triamcinolone acetonide (KENALOG-40) 40 MG/ML injection 40 mg  -

## 2020-06-03 NOTE — PROGRESS NOTES
Per EB Vinson, draw up 4 mL of 0.5% Marcaine and 1 mL of Kenalog 40 for injection to left shoulder. SL  Patient provided education handout for cortisone injection.

## 2020-06-03 NOTE — TELEPHONE ENCOUNTER
Retia Page, the pharmacy stated you needed to provide a collaborating providers name unless you are working independently.

## 2020-06-08 ENCOUNTER — APPOINTMENT (OUTPATIENT)
Dept: GENERAL RADIOLOGY | Facility: HOSPITAL | Age: 50
DRG: 085 | End: 2020-06-08
Attending: EMERGENCY MEDICINE
Payer: COMMERCIAL

## 2020-06-08 ENCOUNTER — APPOINTMENT (OUTPATIENT)
Dept: CT IMAGING | Facility: HOSPITAL | Age: 50
DRG: 085 | End: 2020-06-08
Attending: EMERGENCY MEDICINE
Payer: COMMERCIAL

## 2020-06-08 ENCOUNTER — HOSPITAL ENCOUNTER (INPATIENT)
Facility: HOSPITAL | Age: 50
LOS: 3 days | Discharge: HOME | DRG: 085 | End: 2020-06-11
Attending: EMERGENCY MEDICINE | Admitting: INTERNAL MEDICINE
Payer: COMMERCIAL

## 2020-06-08 DIAGNOSIS — I60.9 SUBARACHNOID HEMORRHAGE (HCC): ICD-10-CM

## 2020-06-08 DIAGNOSIS — I62.9 INTRACRANIAL HEMORRHAGE (HCC): Primary | ICD-10-CM

## 2020-06-08 PROCEDURE — 70486 CT MAXILLOFACIAL W/O DYE: CPT | Performed by: EMERGENCY MEDICINE

## 2020-06-08 PROCEDURE — 73030 X-RAY EXAM OF SHOULDER: CPT | Performed by: EMERGENCY MEDICINE

## 2020-06-08 PROCEDURE — 99223 1ST HOSP IP/OBS HIGH 75: CPT | Performed by: HOSPITALIST

## 2020-06-08 PROCEDURE — 72110 X-RAY EXAM L-2 SPINE 4/>VWS: CPT | Performed by: EMERGENCY MEDICINE

## 2020-06-08 PROCEDURE — 70496 CT ANGIOGRAPHY HEAD: CPT | Performed by: EMERGENCY MEDICINE

## 2020-06-08 PROCEDURE — 70450 CT HEAD/BRAIN W/O DYE: CPT | Performed by: EMERGENCY MEDICINE

## 2020-06-08 RX ORDER — FAMOTIDINE 20 MG/1
20 TABLET ORAL 2 TIMES DAILY
Status: DISCONTINUED | OUTPATIENT
Start: 2020-06-08 | End: 2020-06-09

## 2020-06-08 RX ORDER — TOPIRAMATE 25 MG/1
100 TABLET ORAL EVERY 12 HOURS SCHEDULED
Status: DISCONTINUED | OUTPATIENT
Start: 2020-06-08 | End: 2020-06-09

## 2020-06-08 RX ORDER — ONDANSETRON 2 MG/ML
4 INJECTION INTRAMUSCULAR; INTRAVENOUS EVERY 6 HOURS PRN
Status: DISCONTINUED | OUTPATIENT
Start: 2020-06-08 | End: 2020-06-11

## 2020-06-08 RX ORDER — METOCLOPRAMIDE HYDROCHLORIDE 5 MG/ML
10 INJECTION INTRAMUSCULAR; INTRAVENOUS EVERY 8 HOURS PRN
Status: DISCONTINUED | OUTPATIENT
Start: 2020-06-08 | End: 2020-06-11

## 2020-06-08 RX ORDER — SENNOSIDES 8.6 MG
17.2 TABLET ORAL NIGHTLY
Status: DISCONTINUED | OUTPATIENT
Start: 2020-06-08 | End: 2020-06-11

## 2020-06-08 RX ORDER — SODIUM CHLORIDE 9 MG/ML
INJECTION, SOLUTION INTRAVENOUS CONTINUOUS
Status: DISCONTINUED | OUTPATIENT
Start: 2020-06-08 | End: 2020-06-09

## 2020-06-08 RX ORDER — SODIUM PHOSPHATE, DIBASIC AND SODIUM PHOSPHATE, MONOBASIC 7; 19 G/133ML; G/133ML
1 ENEMA RECTAL ONCE AS NEEDED
Status: DISCONTINUED | OUTPATIENT
Start: 2020-06-08 | End: 2020-06-11

## 2020-06-08 RX ORDER — LEVOFLOXACIN 5 MG/ML
750 INJECTION, SOLUTION INTRAVENOUS EVERY 24 HOURS
Status: DISCONTINUED | OUTPATIENT
Start: 2020-06-08 | End: 2020-06-09

## 2020-06-08 RX ORDER — LABETALOL HYDROCHLORIDE 5 MG/ML
10 INJECTION, SOLUTION INTRAVENOUS EVERY 10 MIN PRN
Status: DISCONTINUED | OUTPATIENT
Start: 2020-06-08 | End: 2020-06-11

## 2020-06-08 RX ORDER — POLYETHYLENE GLYCOL 3350 17 G/17G
17 POWDER, FOR SOLUTION ORAL DAILY PRN
Status: DISCONTINUED | OUTPATIENT
Start: 2020-06-08 | End: 2020-06-11

## 2020-06-08 RX ORDER — DOCUSATE SODIUM 100 MG/1
100 CAPSULE, LIQUID FILLED ORAL 2 TIMES DAILY
Status: DISCONTINUED | OUTPATIENT
Start: 2020-06-08 | End: 2020-06-11

## 2020-06-08 RX ORDER — ACETAMINOPHEN 650 MG/1
650 SUPPOSITORY RECTAL EVERY 4 HOURS PRN
Status: DISCONTINUED | OUTPATIENT
Start: 2020-06-08 | End: 2020-06-11

## 2020-06-08 RX ORDER — MORPHINE SULFATE 4 MG/ML
1 INJECTION, SOLUTION INTRAMUSCULAR; INTRAVENOUS EVERY 2 HOUR PRN
Status: DISCONTINUED | OUTPATIENT
Start: 2020-06-08 | End: 2020-06-09 | Stop reason: SDUPTHER

## 2020-06-08 RX ORDER — ACETAMINOPHEN 325 MG/1
650 TABLET ORAL EVERY 4 HOURS PRN
Status: DISCONTINUED | OUTPATIENT
Start: 2020-06-08 | End: 2020-06-11

## 2020-06-08 RX ORDER — FAMOTIDINE 10 MG/ML
20 INJECTION, SOLUTION INTRAVENOUS 2 TIMES DAILY
Status: DISCONTINUED | OUTPATIENT
Start: 2020-06-08 | End: 2020-06-09

## 2020-06-08 RX ORDER — POTASSIUM CHLORIDE 20 MEQ/1
20 TABLET, EXTENDED RELEASE ORAL ONCE
Status: DISCONTINUED | OUTPATIENT
Start: 2020-06-08 | End: 2020-06-11

## 2020-06-08 RX ORDER — BISACODYL 10 MG
10 SUPPOSITORY, RECTAL RECTAL
Status: DISCONTINUED | OUTPATIENT
Start: 2020-06-08 | End: 2020-06-11

## 2020-06-08 RX ORDER — MORPHINE SULFATE 4 MG/ML
2 INJECTION, SOLUTION INTRAMUSCULAR; INTRAVENOUS EVERY 2 HOUR PRN
Status: DISCONTINUED | OUTPATIENT
Start: 2020-06-08 | End: 2020-06-09 | Stop reason: SDUPTHER

## 2020-06-08 RX ORDER — PHENYLEPHRINE HCL IN 0.9% NACL 50MG/250ML
PLASTIC BAG, INJECTION (ML) INTRAVENOUS CONTINUOUS PRN
Status: DISCONTINUED | OUTPATIENT
Start: 2020-06-08 | End: 2020-06-10

## 2020-06-08 RX ORDER — LORAZEPAM 2 MG/ML
1 INJECTION INTRAMUSCULAR
Status: DISCONTINUED | OUTPATIENT
Start: 2020-06-08 | End: 2020-06-11

## 2020-06-08 NOTE — ED INITIAL ASSESSMENT (HPI)
reports found patient on carpeted floor / unwitnessed fall / c/o left shoulder, low back and jaw pain / pt has no memory of incident and is asking repetitive questions appears confused

## 2020-06-08 NOTE — ED PROVIDER NOTES
Patient Seen in: [facility] Emergency Department      History   Patient presents with:  Fall    Stated Complaint: fall 1 hour pta AOx4 now, doesnt remember fall    HPI    This is a 13-year-old woman, history of seizure disorder, fibromyalgia, migrai [facility])    • Vitamin D deficiency               Past Surgical History:   Procedure Laterality Date   •   April 2001    x 1   • OPEN RX HEEL FRACTURE Left    • OTHER SURGICAL HISTORY      cyst removed right hand                    Social History    To intact.         ED Course     Labs Reviewed   COMP METABOLIC PANEL (14) - Abnormal; Notable for the following components:       Result Value    Potassium 3.3 (*)     BUN/CREA Ratio 20.5 (*)     AST 12 (*)     Total Protein 8.5 (*)     All other components w views were obtained.   COMPARISON:  [facility] , XR, XR LUMBAR SPINE (MIN 2 VIEWS) (CPT=72100), 4/08/2018, 7:04 PM.  INDICATIONS:  fall 1 hour pta AOx4 now, doesn't remember fall  PATIENT STATED HISTORY: (As transcribed by Technologist)  The patient fell and ha DO on 6/08/2020 at 3:31 PM          Ct Brain Or Head (19659)    Result Date: 6/8/2020  PROCEDURE:  CT BRAIN OR HEAD (54055)  COMPARISON:  [facility] CT, CT BRAIN OR HEAD ([id number]), 3/15/2019, 8:40 PM.  INDICATIONS:  fall 1 hour pta AOx4 now, doesnt remember fa BONES (CPT=70486)  COMPARISON:  None. INDICATIONS:  fall 1 hour pta AOx4 now, doesnt remember fall  TECHNIQUE:  Noncontrast CT scanning is performed through the facial bones.  3D shaded surface renderings are created on an independent CT scanner [employer]  FINDINGS:  Please refer to recently dictated noncontrast CT brain for further details regarding subarachnoid hemorrhage. Bilateral high cervical, petrous, cavernous and supraclinoid ICA segments are unremarkable appearance.   Distal branches relay any of the details surrounding the event here today, initially suspected seizure activity given that she was confused after the episode, patient does have a history of seizures however none recently.   CT of the head here today shows intracranial hemo

## 2020-06-08 NOTE — ED NOTES
Pt refusing to allow me to start another IV.  Will hold antibiotics at this time while potassium finishes infusing

## 2020-06-09 ENCOUNTER — APPOINTMENT (OUTPATIENT)
Dept: CT IMAGING | Facility: HOSPITAL | Age: 50
DRG: 085 | End: 2020-06-09
Attending: NURSE PRACTITIONER
Payer: COMMERCIAL

## 2020-06-09 ENCOUNTER — APPOINTMENT (OUTPATIENT)
Dept: CV DIAGNOSTICS | Facility: HOSPITAL | Age: 50
DRG: 085 | End: 2020-06-09
Attending: HOSPITALIST
Payer: COMMERCIAL

## 2020-06-09 ENCOUNTER — APPOINTMENT (OUTPATIENT)
Dept: INTERVENTIONAL RADIOLOGY/VASCULAR | Facility: HOSPITAL | Age: 50
DRG: 085 | End: 2020-06-09
Attending: NURSE PRACTITIONER
Payer: COMMERCIAL

## 2020-06-09 ENCOUNTER — APPOINTMENT (OUTPATIENT)
Dept: CT IMAGING | Facility: HOSPITAL | Age: 50
DRG: 085 | End: 2020-06-09
Attending: EMERGENCY MEDICINE
Payer: COMMERCIAL

## 2020-06-09 ENCOUNTER — NURSE ONLY (OUTPATIENT)
Dept: ELECTROPHYSIOLOGY | Facility: HOSPITAL | Age: 50
DRG: 085 | End: 2020-06-09
Attending: HOSPITALIST
Payer: COMMERCIAL

## 2020-06-09 PROCEDURE — 99222 1ST HOSP IP/OBS MODERATE 55: CPT | Performed by: NEUROLOGICAL SURGERY

## 2020-06-09 PROCEDURE — 72125 CT NECK SPINE W/O DYE: CPT | Performed by: NURSE PRACTITIONER

## 2020-06-09 PROCEDURE — 95816 EEG AWAKE AND DROWSY: CPT | Performed by: OTHER

## 2020-06-09 PROCEDURE — 99232 SBSQ HOSP IP/OBS MODERATE 35: CPT | Performed by: HOSPITALIST

## 2020-06-09 PROCEDURE — 93306 TTE W/DOPPLER COMPLETE: CPT | Performed by: HOSPITALIST

## 2020-06-09 PROCEDURE — 99222 1ST HOSP IP/OBS MODERATE 55: CPT | Performed by: NURSE PRACTITIONER

## 2020-06-09 PROCEDURE — 70450 CT HEAD/BRAIN W/O DYE: CPT | Performed by: NURSE PRACTITIONER

## 2020-06-09 PROCEDURE — 99221 1ST HOSP IP/OBS SF/LOW 40: CPT | Performed by: NURSE PRACTITIONER

## 2020-06-09 PROCEDURE — 99291 CRITICAL CARE FIRST HOUR: CPT | Performed by: INTERNAL MEDICINE

## 2020-06-09 PROCEDURE — B31QYZZ FLUOROSCOPY OF CERVICO-CEREBRAL ARCH USING OTHER CONTRAST: ICD-10-PCS | Performed by: RADIOLOGY

## 2020-06-09 RX ORDER — MORPHINE SULFATE 4 MG/ML
2 INJECTION, SOLUTION INTRAMUSCULAR; INTRAVENOUS EVERY 2 HOUR PRN
Status: DISCONTINUED | OUTPATIENT
Start: 2020-06-09 | End: 2020-06-10

## 2020-06-09 RX ORDER — CAFFEINE CITRATE 20 MG/ML
SOLUTION ORAL
Status: COMPLETED
Start: 2020-06-09 | End: 2020-06-09

## 2020-06-09 RX ORDER — ACETAMINOPHEN 650 MG/1
650 SUPPOSITORY RECTAL EVERY 4 HOURS PRN
Status: DISCONTINUED | OUTPATIENT
Start: 2020-06-09 | End: 2020-06-11

## 2020-06-09 RX ORDER — LIDOCAINE HYDROCHLORIDE 10 MG/ML
INJECTION, SOLUTION INFILTRATION; PERINEURAL
Status: COMPLETED
Start: 2020-06-09 | End: 2020-06-09

## 2020-06-09 RX ORDER — HEPARIN SODIUM 5000 [USP'U]/ML
INJECTION, SOLUTION INTRAVENOUS; SUBCUTANEOUS
Status: COMPLETED
Start: 2020-06-09 | End: 2020-06-09

## 2020-06-09 RX ORDER — CLINDAMYCIN PHOSPHATE 150 MG/ML
INJECTION, SOLUTION INTRAVENOUS
Status: COMPLETED
Start: 2020-06-09 | End: 2020-06-09

## 2020-06-09 RX ORDER — ACETAMINOPHEN 325 MG/1
650 TABLET ORAL EVERY 4 HOURS PRN
Status: DISCONTINUED | OUTPATIENT
Start: 2020-06-09 | End: 2020-06-11

## 2020-06-09 RX ORDER — TOPIRAMATE 100 MG/1
100 TABLET, FILM COATED ORAL EVERY 8 HOURS
Status: DISCONTINUED | OUTPATIENT
Start: 2020-06-09 | End: 2020-06-11

## 2020-06-09 RX ORDER — VERAPAMIL HYDROCHLORIDE 2.5 MG/ML
INJECTION, SOLUTION INTRAVENOUS
Status: DISCONTINUED
Start: 2020-06-09 | End: 2020-06-09 | Stop reason: WASHOUT

## 2020-06-09 RX ORDER — MORPHINE SULFATE 4 MG/ML
1 INJECTION, SOLUTION INTRAMUSCULAR; INTRAVENOUS EVERY 2 HOUR PRN
Status: DISCONTINUED | OUTPATIENT
Start: 2020-06-09 | End: 2020-06-10

## 2020-06-09 RX ORDER — MIDAZOLAM HYDROCHLORIDE 1 MG/ML
INJECTION INTRAMUSCULAR; INTRAVENOUS
Status: COMPLETED
Start: 2020-06-09 | End: 2020-06-09
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
## 2020-06-09 NOTE — PROCEDURES
[facility]  Neurointerventional Surgery Operative Report  [place] Patient Status:  Inpatient    1970 MRN [id number]   [facility]   Hosp Day # 1 PCP Pj Flaherty MD

## 2020-06-09 NOTE — PHYSICAL THERAPY NOTE
PT order received, chart reviewed, vargas with RN, pt to go to angio today and on bed rest at this time, PT will follow up tomorrow as pt approp.

## 2020-06-09 NOTE — CONSULTS
[id number] [facility] Interventional Neuro Radiology Consult    Bree Narvaez Patient Status:  Inpatient    1970 MRN [id number]   [facility] 6NE-A Attending Donna Spears MD   Hosp Day # 1 PCP Carter Rosales MD     REASON FOR CONS difficulty with ambulation, coordination, dysarthria, or speech expression/comprehension.      The patient denies constitutional symptoms, such as fevers, chills, night sweats, weight loss, chest pain, shortness of breath, gastrointestinal or genitourinary MG Oral Tab, Take 100 mg by mouth 3 (three) times daily. , Disp: , Rfl: , Taking  baclofen 20 MG Oral Tab, Take 20 mg by mouth 3 (three) times daily. , Disp: , Rfl: , Taking  HYDROcodone-acetaminophen 5-325 MG Oral Tab, Take 1 tablet by mouth as needed for Q8H PRN  famoTIDine (PEPCID) tab 20 mg, 20 mg, Oral, BID    Or  famoTIDine (PEPCID) injection 20 mg, 20 mg, Intravenous, BID  LORazepam (ATIVAN) injection 1 mg, 1 mg, Intravenous, Q15 Min PRN  levoFLOXacin in D5W (LEVAQUIN) IVPB premix 750 mg, 750 mg, Intr frontal lobe  2. Stable small amount of subdural blood measuring approximate 2 mm along the falx. CTA Brain 6/8/2020  No intracranial aneurysm is seen.   If there is persistent clinical concern then follow-up imaging or conventional catheter based angio

## 2020-06-09 NOTE — PROGRESS NOTES
[facility]  Progress Note     Juliet Coma Patient Status:  Inpatient    1970 MRN [id number]   [facility] 6NE-A Attending Amador Burleson MD   Hosp Day # 1 PCP Ej Nelson MD     Chief Complaint: HA suspected fall    S: Epic.    Medications:   • topiramate  100 mg Oral Q8H   • cefTRIAXone  1 g Intravenous Q24H   • Potassium Chloride ER  20 mEq Oral Once   • levETIRAcetam  500 mg Intravenous Daily   • Senna  17.2 mg Oral Nightly   • docusate sodium  100 mg Oral BID   • lev
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
## 2020-06-09 NOTE — CONSULTS
[facility]  Neurocritical Care Consult Note    Juliet Coma Patient Status:  Inpatient    1970 MRN [id number]   [facility] 6NE-A Attending Amador Burleson MD   Hosp Day # 1 PCP Ej Nelson MD       Reason fo daily., Disp: 30 tablet, Rfl: 2, Taking  topiramate 100 MG Oral Tab, Take 100 mg by mouth 3 (three) times daily. , Disp: , Rfl: , Taking  baclofen 20 MG Oral Tab, Take 20 mg by mouth 3 (three) times daily. , Disp: , Rfl: , Taking  HYDROcodone-acetaminophen Continuous  acetaminophen (TYLENOL) tab 650 mg, 650 mg, Oral, Q4H PRN    Or  acetaminophen (TYLENOL) 650 MG rectal suppository 650 mg, 650 mg, Rectal, Q4H PRN  morphINE sulfate (PF) 4 MG/ML injection 1 mg, 1 mg, Intravenous, Q2H PRN    Or  morphINE sulfate melena. :                       Denies dysuria or hematuria. Skin:                     Denies rashes or open areas. Neuro: As per HPI    All other systems were reviewed and are negative.     Vitals:   Temp:  [97.6 °F (36.4 °C)-98.1 °F neurochecks, eeg   A total of 45 minutes of critical care time (exclusive of billable procedures) was administered to manage and/or prevent neurologic instability.  This involved direct patient intervention, complex decision making, and/or extensive discuss

## 2020-06-09 NOTE — PLAN OF CARE
Assumed care of the pt at 0730, see neuro flowsheet and vss for further charting. Pain meds per Emar. Pt kept on BR and urine pregnancy test sent down before going to neuro IR Lab.  Consent obtained at bedside, received pt back from lab at 1630, Right groin

## 2020-06-09 NOTE — H&P
[facility]  History and Physical     Kevinblack Tapia Patient Status:  Inpatient    1970 MRN [id number]   [facility] 6NE-A Attending Hemal Flanagan MD   Hosp Day # 0 PCP Mamta Case MD     Chief Complaint: fall    His has never smoked. She has never used smokeless tobacco. She reports that she does not drink alcohol or use drugs.     Family History:   Family History   Problem Relation Age of Onset   • Cancer Father         lung ca-smoker   • Hypertension Mother    • Othe Moist mucous membranes. EOM-I. PERRLA. Anicteric. Neck: No lymphadenopathy. No JVD. No carotid bruits. Respiratory: Clear to auscultation bilaterally. No wheezes. No rhonchi. Cardiovascular: S1, S2. Regular rate and rhythm. No murmurs, rubs or gallops.

## 2020-06-09 NOTE — SLP NOTE
Attempted to see pt for speech therapy services- RN reported pt to remain NPO for pending surgery. Will follow up as scheduling permits. Thank you.     Radha Shankar M.S. [id number] [facility]  Pager [phone]

## 2020-06-09 NOTE — CM/SW NOTE
Pt admitted for intracranial hemorrhage. Pt lives with her spouse. PT/OT to see. SW to follow for any d/c needs.

## 2020-06-09 NOTE — PROCEDURES
[facility]  Pre-Procedure Note  Piter Yates Patient Status:  Inpatient    1970 MRN [id number]   [facility]   Hosp Day # 1 PCP Bennett Caro MD     Pre-Op Diagnosis:  SAH in

## 2020-06-09 NOTE — OCCUPATIONAL THERAPY NOTE
OT order received, chart reviewed, vargas with RN, pt to go to angio today and on bed rest at this time, OT will follow up tomorrow as pt approp.

## 2020-06-09 NOTE — CONSULTS
cisco  [facility]  Neurosurgery Consult    Joey Simmons Patient Status:  Inpatient    1970 MRN [id number]   [facility] 6NE-A Attending Ann Marie Bae MD   Hosp Day # 1 PCP Ruby Rogers MD     REASON FOR CONSULTATION:  José Luis Triplett other musculoskeletal disorders(V13.59)    • Seizure disorder [facility])    • Vitamin D deficiency        PAST SURGICAL HISTORY:  Past Surgical History:   Procedure Laterality Date   •   April 2001    x 1   • OPEN RX HEEL FRACTURE Left    • OTHER SURGICAL H Continuous  acetaminophen (TYLENOL) tab 650 mg, 650 mg, Oral, Q4H PRN    Or  acetaminophen (TYLENOL) 650 MG rectal suppository 650 mg, 650 mg, Rectal, Q4H PRN  morphINE sulfate (PF) 4 MG/ML injection 1 mg, 1 mg, Intravenous, Q2H PRN    Or  morphINE sulfate atraumatic. NEUROLOGICAL:  This patient is alert and orientated x 3. Speech fluent. Comprehension intact. Unable to perform serial 7s and state months of the year backwards. PERRLA +3 brisk,  EOMI. Face is symmetrical. CN's GI.   Sensation to light to acute territorial infarct. SINUSES:           No sign of acute sinusitis. MASTOIDS:          No sign of acute inflammation. SKULL:             No evidence for fracture or osseous abnormality. OTHER:             None. CONCLUSION:    1.  There is a skull fracture. CONCLUSION:  1. Slight interval decrease in amount of subarachnoid blood along the midline of the frontal lobe  2. Stable small amount of subdural blood measuring approximate 2 mm along the falx.       ASSESSMENT:  47year old female w/

## 2020-06-09 NOTE — PROCEDURES
Date of Procedure: 6/9/2020    Procedure: EEG (ELECTROENCEPHALOGRAM)     DX: FALL  HX: PT IS A 48 YO FEMALE WHO PRESENTED TO THE ED ON 6/08/2020 AFTER A FALL IN HER HOME. PT WAS IN NORMAL HEALTH AND DID NOT HAVE ANY AURA/PRODOME PRIOR TO LOC.  PT HAS HX OF

## 2020-06-10 PROCEDURE — 99232 SBSQ HOSP IP/OBS MODERATE 35: CPT | Performed by: NURSE PRACTITIONER

## 2020-06-10 PROCEDURE — 99232 SBSQ HOSP IP/OBS MODERATE 35: CPT | Performed by: STUDENT IN AN ORGANIZED HEALTH CARE EDUCATION/TRAINING PROGRAM

## 2020-06-10 PROCEDURE — 99291 CRITICAL CARE FIRST HOUR: CPT | Performed by: INTERNAL MEDICINE

## 2020-06-10 RX ORDER — ASPIRIN 81 MG/1
81 TABLET, CHEWABLE ORAL DAILY
Status: DISCONTINUED | OUTPATIENT
Start: 2020-06-10 | End: 2020-06-11

## 2020-06-10 RX ORDER — POTASSIUM CHLORIDE 20 MEQ/1
40 TABLET, EXTENDED RELEASE ORAL EVERY 4 HOURS
Status: COMPLETED | OUTPATIENT
Start: 2020-06-10 | End: 2020-06-10

## 2020-06-10 RX ORDER — HYDROCODONE BITARTRATE AND ACETAMINOPHEN 5; 325 MG/1; MG/1
1 TABLET ORAL EVERY 6 HOURS PRN
Status: DISCONTINUED | OUTPATIENT
Start: 2020-06-10 | End: 2020-06-11

## 2020-06-10 NOTE — SLP NOTE
ADULT SWALLOWING EVALUATION    ASSESSMENT    ASSESSMENT/OVERALL IMPRESSION:  Orders were received for a bedside swallowing evaluation. Patient s/p unwitnessed fall at home with LOC and confusion. Unable to recall details surrounding the fall.  On 6/9/20, pa or less ([facility] [address]    Cervicalgia    Concussion wth loss of consciousness of 30 minutes or less      Past Medical History  Past Medical History:   Diagnosis Date   • Adrenal adenoma Dx in 4/2010     Incidentally found to bilateral adrenal fullness by MRI of the Videofluoroscopic Swallow Study is required to rule-out silent aspiration.)    Esophageal Phase of Swallow: No complaints consistent with possible esophageal involvement            GOALS  Goal #1 The patient will tolerate regular consistency and thin liqui

## 2020-06-10 NOTE — PLAN OF CARE
Pt. Follows commands, pain is controlled, pt/ot evaluated pt., vitals stable, pt. Can transfer to room 7600, report called to Gildardo Coleman., all personal belongings with patient.

## 2020-06-10 NOTE — PHYSICAL THERAPY NOTE
PHYSICAL THERAPY EVALUATION - INPATIENT     Room Number: 5434/9491-D  Evaluation Date: 6/10/2020  Type of Evaluation: Initial  Physician Order: PT Eval and Treat    Presenting Problem: Small SDH, SAH after a fall  Reason for Therapy: Mobility Dysfunc • OTHER SURGICAL HISTORY  1999    cyst removed right hand       HOME SITUATION  [place] of [place]   Home Layout: Two level  Stairs to Enter : 5  Railing: Yes  Stairs to Bedroom: 14  Railing: Yes    Lives With: Spouse(21 y.o. son)  Drives: Yes  [place] NEUROLOGICAL FINDINGS  Neurological Findings: Coordination - Rapid Alternating Movement        Coordination - Rapid Alternating Movement: Left decreased speed;Right decreased speed;Symmetrical            AM-PAC '6-Clicks' INPATIENT SHORT FORM - BASI by therapist in order to articulate the pain. Pt also noting sharp pain in head after a toilet transfer, that subsided once she was sitting. [address] notified of findings.     Exercise/Education Provided:  Functional activity tolerated  Gait training  Tr to/from [facility] at assistance level: modified independent     Goal #3 Patient is able to ambulate 300 feet with assist device: none at assistance level: supervision     Goal #4 Pt will complete flight of stairs w/ use of railing and supervision assist.     [facility]

## 2020-06-10 NOTE — PROGRESS NOTES
[facility]  Neurosurgery Progress Note    Glendy Tapia Patient Status:  Inpatient    1970 MRN [id number]   [facility] 6NE-A Attending Eddie Irving MD   Hosp Day # 2 PCP Mamta Case MD     Chief Complaint:  Headache Findings are without significant change when compared to most recent cervical spine CT performed 3/15/2019.     Assessment:  47year old female w/ small frontal SAH, small interhemispheric SDH with negative cerebral angiogram  Post-concussion syndrome  Cerv

## 2020-06-10 NOTE — PROGRESS NOTES
[facility]  Interventional Neuroradiology Progress Note    Elex Shows Patient Status:  Inpatient    1970 MRN [id number]   [facility] 6NE-A Attending Viviane Brar MD   Hosp Day # 2 PCP Manuelito Dhillon MD     Subjective:  Sukumar Pope 06/10/2020    CO2 23.0 06/10/2020    GLU 87 06/10/2020    CA 8.7 06/10/2020    MG 2.2 06/10/2020    PHOS 4.2 06/10/2020    PGLU 92 06/09/2020     Imaging:  Cerebral Angiogram 6/9/2020  No definite aneurysm or AV shunt. No definite vasculopathy.   Probable F

## 2020-06-10 NOTE — PROGRESS NOTES
[facility] HOSPITALIST  Progress Note     Steve Pinto Patient Status:  Inpatient    1970 MRN [id number]   [facility] 6NE-A Attending Marya Domingo MD   Hosp Day # 2 PCP Ángela Hearn MD     Chief Complaint: HA suspected fall    S: Recent Labs   Lab 06/08/20  2103   TROP <0.045            Imaging: Imaging data reviewed in Epic.     Medications:   • aspirin  81 mg Oral Daily   • topiramate  100 mg Oral Q8H   • Potassium Chloride ER  20 mEq Oral Once   • levETIRAcetam  500 mg Intr

## 2020-06-10 NOTE — CM/SW NOTE
06/10/20 1500   CM/SW Referral Data   Referral Source Social Work (self-referral)   Reason for Referral Discharge planning   Informant Patient;Spouse   Patient Info   Patient's Mental Status Alert;Confused;Memory Impairments   Patient's Home Environment

## 2020-06-10 NOTE — PROGRESS NOTES
[facility]  Neurocritical Care Progress Note     Cora Howell Patient Status:  Inpatient    1970 MRN [id number]   [facility] 6NE-A Attending Poonam Martin MD   Hosp Day # 2 PCP Terri Molina MD     Subject Oral, Q4H PRN    Or  acetaminophen (TYLENOL) 650 MG rectal suppository 650 mg, 650 mg, Rectal, Q4H PRN  Labetalol HCl (TRANDATE) injection 10 mg, 10 mg, Intravenous, Q10 Min PRN  niCARdipine HCl in NaCl (CARDENE) 20 mg/200 ml premix infusion, 5-15 mg/hr, I Assesment/Plan:     Neuro:  sdh/sah- likely traumatic 2/2 fall of unclear etiology. Rpt ct stable, cta/angio neg for source, cont neurochecks/pt/ot. Symptoms c/w post-concussive syndrome, treat supportively.    Per RAMA, plan for rpt angio in 1week for f

## 2020-06-10 NOTE — PLAN OF CARE
Assumed patient care this morning around 0730 am. Patient is alert and oriented, occasional forgetfulness. Patient having slight generalized weakness. Able to SMITH's with equal strength, has history of left frozen shoulder, unable to lift above her head.  Pa

## 2020-06-10 NOTE — OCCUPATIONAL THERAPY NOTE
OCCUPATIONAL THERAPY EVALUATION - INPATIENT     Room Number: 9179/0833-T  Evaluation Date: 6/10/2020  Type of Evaluation: Initial  Presenting Problem: Fall, SAH, post concussive syndrome.     Physician Order: IP Consult to Occupational Therapy  Reason for T Spouse(19 y.o. son)    Toilet and Equipment: Standard height toilet  Shower/Tub and Equipment: Walk-in shower; Tub-shower combo       Occupation/Status: works from home  [address]: (both, uses L for cooking and driving)  Drives: Yes  Patient Regularly Coordination - Rapid Alternating Movement; Coordination - Finger Opposition     Coordination - Rapid Alternating Movement: Symmetrical  Coordination - Finger Opposition: Symmetrical       ACTIVITY TOLERANCE                         O2 SATURATIONS old female admitted on 6/8/2020 for fall, SAH, post concussive syndrome. Complete medical history and occupational profile noted above. Functional outcome measures completed include AMPAC, ROM, MMT.  The AM-MIKO ' '6-Clicks' Inpatient Daily Activity Short Fo OP OT if unable to schedule day rehab)       PLAN  OT Treatment Plan: Balance activities; ADL training;Energy conservation/work simplification techniques; Visual perceptual training;Functional transfer training; Endurance training;UE strengthening/ROM; Patient

## 2020-06-10 NOTE — PLAN OF CARE
Patient arrived to CTU 7 @ 1400  NSR on tele.  Room air  Alert and oriented x4  Denies pain  Neurologically intact, forgetful at times  Potassium replaced per electrolyte protocol    Plan of care discussed with patient

## 2020-06-10 NOTE — PLAN OF CARE
Problem: Impaired Swallowing  Goal: Minimize aspiration risk  Description  Interventions:  - Patient should be alert and upright for all feedings (90 degrees preferred)  - Offer food and liquids at a slow rate  - Straws ok  - Encourage small bites of roel

## 2020-06-11 ENCOUNTER — TELEPHONE (OUTPATIENT)
Dept: SURGERY | Facility: CLINIC | Age: 50
End: 2020-06-11

## 2020-06-11 VITALS
DIASTOLIC BLOOD PRESSURE: 68 MMHG | SYSTOLIC BLOOD PRESSURE: 112 MMHG | RESPIRATION RATE: 19 BRPM | TEMPERATURE: 98 F | HEART RATE: 98 BPM | WEIGHT: 173.75 LBS | BODY MASS INDEX: 26.33 KG/M2 | OXYGEN SATURATION: 98 % | HEIGHT: 68 IN

## 2020-06-11 DIAGNOSIS — I60.9 SAH (SUBARACHNOID HEMORRHAGE) (HCC): Primary | ICD-10-CM

## 2020-06-11 PROCEDURE — 99239 HOSP IP/OBS DSCHRG MGMT >30: CPT | Performed by: STUDENT IN AN ORGANIZED HEALTH CARE EDUCATION/TRAINING PROGRAM

## 2020-06-11 PROCEDURE — 99233 SBSQ HOSP IP/OBS HIGH 50: CPT | Performed by: OTHER

## 2020-06-11 RX ORDER — ASPIRIN 81 MG/1
81 TABLET, CHEWABLE ORAL DAILY
Qty: 30 TABLET | Refills: 1 | Status: SHIPPED | OUTPATIENT
Start: 2020-06-12 | End: 2020-07-13

## 2020-06-11 RX ORDER — HYDROCODONE BITARTRATE AND ACETAMINOPHEN 5; 325 MG/1; MG/1
1 TABLET ORAL AS NEEDED
Qty: 12 TABLET | Refills: 0 | Status: SHIPPED | OUTPATIENT
Start: 2020-06-11 | End: 2020-07-01 | Stop reason: ALTCHOICE

## 2020-06-11 NOTE — PROGRESS NOTES
Discussed with NI and ok for patient to be discharged today. Continue ASA 81mg daily. NI office will call patient to schedule repeat cerebral angiogram next week. Primary RN made aware.     EB Eduardo  [facility]

## 2020-06-11 NOTE — PLAN OF CARE
Assumed care at [address]. Pt a/ox4, forgetful at times. VSS. NSR per tele. RA. Norco prn for pain w/ relief. Seizure precaution maintained. Pt updated w/ POC. Will continue to monitor.

## 2020-06-11 NOTE — CM/SW NOTE
Sw met with pt and spoke to . Explained that [address] [facility] has not gotten back to me re: referral- their  is off sick. Offered to refer pt to [facility].    prefers to go to [facility] until he can get pt in to [facility]

## 2020-06-11 NOTE — PROGRESS NOTES
[address] [facility] Neu[facility] Progress Note    Christopher Padilla Patient Status:  Inpatient    1970 MRN [id number]   [facility] 7NE-A Attending Josep Mercado MD   Hosp Day # 3 PCP Torey Mcintosh MD     CC: Fall    Subjective:  Janelle Appiah amount of subdural blood measuring approximate 2mm along the falx. 6/9/2020 EEG  There were no electroclinical seizure or epileptiform discharges captured. 6/8/2020 CTA Brain  No intracranial aneurysm is seen.       Assessment/Plan:  · Traumatic SA to this event    Recommendations:  RAMA planned repeat angio next week  Neurologically stable, no further recommendation, would cont current AEDs  I discussed with patient/family at length regarding all studies' results, assessment, treatment options and ca

## 2020-06-11 NOTE — SLP NOTE
SPEECH DAILY NOTE - INPATIENT    ASSESSMENT & PLAN   ASSESSMENT  Pt seen for dysphagia tx to assess tolerance with recommended diet, ensure proper utilization of aspiration precautions and provide pt/family education.   Patient seen with recommended diet of

## 2020-06-11 NOTE — OCCUPATIONAL THERAPY NOTE
OCCUPATIONAL THERAPY TREATMENT NOTE - INPATIENT     Room Number: 7709/2614-T  Session: 1        Presenting Problem: Fall, SAH, post concussive syndrome.     History related to current admission:  Pt is 47year old female admitted on 6/8/2020 from home with because I don't remember what happened to me. \"        OBJECTIVE  Precautions: Seizure(-150)    WEIGHT BEARING RESTRICTION  Weight Bearing Restriction: None                PAIN ASSESSMENT  Ratin  Location: low back  Management Techniques: Reposi safety. **In the afternoon,  contacted OT about d/c recommendation.  is interested in pursuing outpatient OT until day rehab appt is available. Order was already placed. Issued outpatient flyer.     PPE worn by this OT: surgical mask a

## 2020-06-11 NOTE — SLP NOTE
SPEECH/LANGUAGE/COGNITIVE EVALUATION - INPATIENT    Admission Date: 6/8/2020  Evaluation Date: 06/11/20    Reason for Referral: S/P fall Subarachnoid Bleed    ASSESSMENT & PLAN   ASSESSMENT & IMPRESSION  Cognitive/communicative assessment completed as bri patient/family/caregiver will demonstrate understanding and implementation of aspiration precautions and swallow strategies independently over 1 session(s). In Progress   Goal #3 Patient will participate in a cognitive-communicative assessment.    Not A

## 2020-06-11 NOTE — TELEPHONE ENCOUNTER
Patient currently still inpatient. Will need to reach out to patient 6/12/2020 to schedule angiogram next week. Date held for 6/17/2020 at 8 am with . Will need to call patient to go over date/time and procedure instructions.

## 2020-06-11 NOTE — PLAN OF CARE
Assumed care at 0730  Patient alert and oriented x4  Neurologically intact. Forgetful at times  Worked with OT. Up in chair. Up to bathroom  norco given for headache    AVS reviewed with patient. Driven home by .  All questions answered

## 2020-06-11 NOTE — TELEPHONE ENCOUNTER
patient had cerebral angiogram with dr. Jessica Pressley while in hospital.  patient being discharged today and Dr. Jessica Pressley would like patient to have another angiogram next week  Tues, Wed, or Thurs with whomever is here.   Please reach out to patient to get sche

## 2020-06-12 ENCOUNTER — TELEPHONE (OUTPATIENT)
Dept: SURGERY | Facility: CLINIC | Age: 50
End: 2020-06-12

## 2020-06-12 NOTE — TELEPHONE ENCOUNTER
Spoke to Moisés Grimes at [place], Cerebral [address] ([id number], [id number], [id number], [id number], [id number]) is a valid, billable and covered service. For in-network providers, no prior authorization is required for this service. Call reference #.

## 2020-06-12 NOTE — DISCHARGE SUMMARY
[facility] HOSPITALIST  DISCHARGE SUMMARY     Dominick Woods Patient Status:  Inpatient    1970 MRN [id number]   [facility] 7NE-A Attending No att. providers found   Hosp Day # 3 PCP Jaime Schwartz MD     Date of Admission: 2020 Recommendation:  LACE > 58:  High Risk of readmission after discharge from the hospital.    Procedures during hospitalization:   CTA brain  FINDINGS:    Please refer to recently dictated noncontrast CT brain for further details regarding subarachnoid hemorr tablet  Refills:  0     levETIRAcetam 500 MG Tabs  Commonly known as:  KEPPRA      TAKE 1 TABLET BY MOUTH EVERY MORNING AND 2 TABLETS EVERY NIGHT AT BEDTIME   Quantity:  90 tablet  Refills:  5     Potassium Chloride ER 20 MEQ Tbcr  Commonly known as:  K-DU rhonchi. Cardiovascular: S1, S2. Regular rate and rhythm. No murmurs, rubs or gallops. Abdomen: Soft, nontender, nondistended. Positive bowel sounds. No rebound or guarding. Neurologic: No focal neurological deficits.    Musculoskeletal: Moves all extr

## 2020-06-12 NOTE — TELEPHONE ENCOUNTER
Norberto murray. Patient was in the hospital on 6/9 and discharged yesterday 6/11. She had a SAH. First angio was completed on 6/9 and negative for aneurysm. She needs a follow up angiogram set up for next Tuesday (6/16/20).   This will be coordinated outpat

## 2020-06-12 NOTE — PAYOR COMM NOTE
--------------  DISCHARGE REVIEW    Payor: [address] [place] [address] Drive #:  [id number]  Authorization Number: [id number]    Admit date: 6/8/20  Admit time:  1950  Discharge Date: 6/11/2020  4:01 PM     Admitting Physician: Mindy Patrick DO  Attending Phys

## 2020-06-12 NOTE — TELEPHONE ENCOUNTER
Per MAGDIEL Crenshaw in separate TE on 6/12/20:    \"Patient was in the hospital on 6/9 and discharged yesterday 6/11. She had a SAH.   First angio was completed on 6/9 and negative for aneurysm.    She needs a follow up angiogram set up for next Tuesda minutes. If it continues to bleed, call 911 or get to ER immediately. ·   · You may resume normal activities the following day. ·   · Patient will need to follow up in the office 1 month after procedure.     What to expect:    · You will be at the [facility]

## 2020-06-15 ENCOUNTER — LAB ENCOUNTER (OUTPATIENT)
Dept: LAB | Facility: HOSPITAL | Age: 50
End: 2020-06-15
Attending: RADIOLOGY
Payer: COMMERCIAL

## 2020-06-15 DIAGNOSIS — I60.9 SUBARACHNOID HEMORRHAGE (HCC): ICD-10-CM

## 2020-06-17 ENCOUNTER — HOSPITAL ENCOUNTER (OUTPATIENT)
Dept: INTERVENTIONAL RADIOLOGY/VASCULAR | Facility: HOSPITAL | Age: 50
Discharge: HOME | End: 2020-06-17
Attending: RADIOLOGY | Admitting: RADIOLOGY
Payer: COMMERCIAL

## 2020-06-17 ENCOUNTER — TELEPHONE (OUTPATIENT)
Dept: SURGERY | Facility: CLINIC | Age: 50
End: 2020-06-17

## 2020-06-17 VITALS
WEIGHT: 170 LBS | RESPIRATION RATE: 16 BRPM | HEART RATE: 62 BPM | TEMPERATURE: 98 F | SYSTOLIC BLOOD PRESSURE: 108 MMHG | BODY MASS INDEX: 27.32 KG/M2 | DIASTOLIC BLOOD PRESSURE: 65 MMHG | OXYGEN SATURATION: 98 % | HEIGHT: 66 IN

## 2020-06-17 DIAGNOSIS — B97.5: Primary | ICD-10-CM

## 2020-06-17 DIAGNOSIS — A08.39: Primary | ICD-10-CM

## 2020-06-17 DIAGNOSIS — I60.9 SAH (SUBARACHNOID HEMORRHAGE) (HCC): ICD-10-CM

## 2020-06-17 DIAGNOSIS — I60.9 SUBARACHNOID HEMORRHAGE (HCC): ICD-10-CM

## 2020-06-17 PROCEDURE — 99152 MOD SED SAME PHYS/QHP 5/>YRS: CPT

## 2020-06-17 PROCEDURE — 36224 PLACE CATH CAROTD ART: CPT

## 2020-06-17 PROCEDURE — B31CYZZ FLUOROSCOPY OF BILATERAL EXTERNAL CAROTID ARTERIES USING OTHER CONTRAST: ICD-10-PCS | Performed by: RADIOLOGY

## 2020-06-17 PROCEDURE — 99153 MOD SED SAME PHYS/QHP EA: CPT

## 2020-06-17 PROCEDURE — B312YZZ FLUOROSCOPY OF LEFT SUBCLAVIAN ARTERY USING OTHER CONTRAST: ICD-10-PCS | Performed by: RADIOLOGY

## 2020-06-17 PROCEDURE — 36215 PLACE CATHETER IN ARTERY: CPT

## 2020-06-17 PROCEDURE — B311YZZ FLUOROSCOPY OF RIGHT BRACHIOCEPHALIC-SUBCLAVIAN ARTERY USING OTHER CONTRAST: ICD-10-PCS | Performed by: RADIOLOGY

## 2020-06-17 PROCEDURE — B318YZZ FLUOROSCOPY OF BILATERAL INTERNAL CAROTID ARTERIES USING OTHER CONTRAST: ICD-10-PCS | Performed by: RADIOLOGY

## 2020-06-17 PROCEDURE — 36227 PLACE CATH XTRNL CAROTID: CPT

## 2020-06-17 PROCEDURE — 36216 PLACE CATHETER IN ARTERY: CPT

## 2020-06-17 RX ORDER — HEPARIN SODIUM 5000 [USP'U]/ML
INJECTION, SOLUTION INTRAVENOUS; SUBCUTANEOUS
Status: COMPLETED
Start: 2020-06-17 | End: 2020-06-17

## 2020-06-17 RX ORDER — MORPHINE SULFATE 4 MG/ML
1 INJECTION, SOLUTION INTRAMUSCULAR; INTRAVENOUS EVERY 2 HOUR PRN
Status: DISCONTINUED | OUTPATIENT
Start: 2020-06-17 | End: 2020-06-17

## 2020-06-17 RX ORDER — VERAPAMIL HYDROCHLORIDE 2.5 MG/ML
INJECTION, SOLUTION INTRAVENOUS
Status: DISCONTINUED
Start: 2020-06-17 | End: 2020-06-17 | Stop reason: WASHOUT

## 2020-06-17 RX ORDER — ONDANSETRON 2 MG/ML
4 INJECTION INTRAMUSCULAR; INTRAVENOUS EVERY 6 HOURS PRN
Status: DISCONTINUED | OUTPATIENT
Start: 2020-06-17 | End: 2020-06-17

## 2020-06-17 RX ORDER — MIDAZOLAM HYDROCHLORIDE 1 MG/ML
INJECTION INTRAMUSCULAR; INTRAVENOUS
Status: COMPLETED
Start: 2020-06-17 | End: 2020-06-17

## 2020-06-17 RX ORDER — PROTAMINE SULFATE 10 MG/ML
INJECTION, SOLUTION INTRAVENOUS
Status: COMPLETED
Start: 2020-06-17 | End: 2020-06-17

## 2020-06-17 RX ORDER — ACETAMINOPHEN 325 MG/1
650 TABLET ORAL EVERY 4 HOURS PRN
Status: DISCONTINUED | OUTPATIENT
Start: 2020-06-17 | End: 2020-06-17

## 2020-06-17 RX ORDER — CAFFEINE CITRATE 20 MG/ML
SOLUTION ORAL
Status: DISCONTINUED
Start: 2020-06-17 | End: 2020-06-17 | Stop reason: WASHOUT

## 2020-06-17 RX ORDER — METOCLOPRAMIDE HYDROCHLORIDE 5 MG/ML
10 INJECTION INTRAMUSCULAR; INTRAVENOUS EVERY 8 HOURS PRN
Status: DISCONTINUED | OUTPATIENT
Start: 2020-06-17 | End: 2020-06-17

## 2020-06-17 RX ORDER — ACETAMINOPHEN 650 MG/1
650 SUPPOSITORY RECTAL EVERY 4 HOURS PRN
Status: DISCONTINUED | OUTPATIENT
Start: 2020-06-17 | End: 2020-06-17

## 2020-06-17 RX ORDER — LIDOCAINE HYDROCHLORIDE 10 MG/ML
INJECTION, SOLUTION INFILTRATION; PERINEURAL
Status: COMPLETED
Start: 2020-06-17 | End: 2020-06-17

## 2020-06-17 RX ORDER — MORPHINE SULFATE 4 MG/ML
2 INJECTION, SOLUTION INTRAMUSCULAR; INTRAVENOUS EVERY 2 HOUR PRN
Status: DISCONTINUED | OUTPATIENT
Start: 2020-06-17 | End: 2020-06-17

## 2020-06-17 NOTE — PROGRESS NOTES
Patient received from cath lab s/p Cerebral Angio , Left groin site soft, no hematoma, dressing C/D/I. Pulses intact. Hemodynamics stable. Post-op orders received and implemented. Patient  educated on flat bedrest, verbalize understanding.  Dr. Andi Grady and

## 2020-06-17 NOTE — PROCEDURES
[facility]  Neurointerventional Surgery Operative Report  Sam Christensen Patient Status:  Outpatient in a Bed    1970 MRN [id number]   Location [address] [facility] Attending Dominick Grimes MD   Hosp Day # 0 PCP Adina Castro

## 2020-06-17 NOTE — TELEPHONE ENCOUNTER
Hi,  Please cancel appointment patient has with Dr. Ric Napoles on 7/16. Please place reminder that patient will be due for follow up visit in 6 months with Dr. Ric Napoles. She will need to complete non contrast MRA brain prior to her visit.       Thank you

## 2020-06-17 NOTE — H&P
History & Physical Examination    Patient Name: Gary Lamb  MRN: [id number]  CSN: [id number]  YOB: 1970    Diagnosis:   Subarachnoid Hemorrhage  Syncopal event with loss of consciousness     Present Illness:    This H+P was copied from mg/dL 13   CREATININE      0.55 - 1.02 mg/dL 0.49 (L)     Component      Latest Ref Rng & Units 6/10/2020   WBC      4.0 - 11.0 x10(3) uL 6.4     Component      Latest Ref Rng & Units 6/10/2020   Hemoglobin      12.0 - 16.0 g/dL 14.2   Hematocrit      35. 0 Smoker      Smokeless tobacco: Never Used    Alcohol use: No      Alcohol/week: 0.0 standard drinks      SYSTEM Check if Review is Normal Check if Physical Exam is Normal If not normal, please explain:   HEENT [X] [X]    NECK & BACK David ] [X]    HEART [X] [

## 2020-06-17 NOTE — PROCEDURES
[facility]  Pre-Procedural Note  Vik Flores Patient Status:  Outpatient in a Bed    1970 MRN [id number]   [facility] Attending Vahid Willson MD   Hosp Day # 0 PCP Jose L Ho MD     Procedure: Sonia Spangler

## 2020-07-01 ENCOUNTER — OFFICE VISIT (OUTPATIENT)
Dept: SURGERY | Facility: CLINIC | Age: 50
End: 2020-07-01
Payer: COMMERCIAL

## 2020-07-01 ENCOUNTER — TELEPHONE (OUTPATIENT)
Dept: SURGERY | Facility: CLINIC | Age: 50
End: 2020-07-01

## 2020-07-01 VITALS — SYSTOLIC BLOOD PRESSURE: 104 MMHG | HEART RATE: 64 BPM | DIASTOLIC BLOOD PRESSURE: 74 MMHG

## 2020-07-01 DIAGNOSIS — I60.9 SAH (SUBARACHNOID HEMORRHAGE) (HCC): Primary | ICD-10-CM

## 2020-07-01 PROCEDURE — 1111F DSCHRG MED/CURRENT MED MERGE: CPT | Performed by: NURSE PRACTITIONER

## 2020-07-01 PROCEDURE — 99212 OFFICE O/P EST SF 10 MIN: CPT | Performed by: NURSE PRACTITIONER

## 2020-07-01 RX ORDER — DICLOFENAC SODIUM 75 MG/1
75 TABLET, DELAYED RELEASE ORAL 2 TIMES DAILY
COMMUNITY
End: 2021-02-09

## 2020-07-01 NOTE — PROGRESS NOTES
Pt is here for follow up for groin check sp angio. Pt states that she has been doing well. Pt denies any issues with fever chills or issues with groin site.      Review of Systems:    Hand Dominance: right  General: no symptoms reported  Neuro: no symptoms r

## 2020-07-01 NOTE — PROGRESS NOTES
[facility]  Interventional Neuroradiology Progress Note    Vik Flores     1970 MRN [id number]   Location [address] [facility], [facility] Jose L Ho MD     Subjective:   We had the pleasure of seeing Francisco Weinberg episode and loss of consciousness found to have small SAH. She underwent cerebral angiography x 2, both without aneurysmal finding or other vascular malformation. Plan:  I instructed patient that small hematoma is stable. Ok to resume walking slowly.

## 2020-07-01 NOTE — TELEPHONE ENCOUNTER
Please place reminder for TOF MRA head with and with contrast in 6 months (December 2020). Patient to see Dr. Edgardo Araiza following imaging.

## 2020-07-13 ENCOUNTER — OFFICE VISIT (OUTPATIENT)
Dept: NEUROLOGY | Facility: CLINIC | Age: 50
End: 2020-07-13
Payer: COMMERCIAL

## 2020-07-13 VITALS
DIASTOLIC BLOOD PRESSURE: 69 MMHG | RESPIRATION RATE: 16 BRPM | HEART RATE: 85 BPM | HEIGHT: 66 IN | BODY MASS INDEX: 27.32 KG/M2 | TEMPERATURE: 97 F | WEIGHT: 170 LBS | SYSTOLIC BLOOD PRESSURE: 105 MMHG

## 2020-07-13 DIAGNOSIS — F44.5 PSEUDOSEIZURE: ICD-10-CM

## 2020-07-13 DIAGNOSIS — G40.909 SEIZURE DISORDER (HCC): Primary | ICD-10-CM

## 2020-07-13 DIAGNOSIS — I77.3 FIBROMUSCULAR DYSPLASIA (HCC): ICD-10-CM

## 2020-07-13 DIAGNOSIS — H02.403 DROOPING EYELID DISEASE, BILATERAL: ICD-10-CM

## 2020-07-13 DIAGNOSIS — G43.109 MIGRAINE WITH AURA AND WITHOUT STATUS MIGRAINOSUS, NOT INTRACTABLE: ICD-10-CM

## 2020-07-13 PROCEDURE — 99214 OFFICE O/P EST MOD 30 MIN: CPT | Performed by: OTHER

## 2020-07-13 NOTE — PROGRESS NOTES
[facility] with [address]  7/2/1970  Primary Care Provider:  Ej Nelson MD    7/13/2020  Accompanied visit:      (x) No.        45year old yo patient being seen for: OK      RELEVANT LABS and other DATA reviewed.  Reviewed labs during her hospitalization      Problem/s Identified this visit:   Seizure disorder ([facility] [address]  (primary encounter diagnosis)  Pseudoseizure  Migraine with aura and without status migrainosus, not int

## 2020-09-02 ENCOUNTER — TELEPHONE (OUTPATIENT)
Dept: NEUROLOGY | Facility: CLINIC | Age: 50
End: 2020-09-02

## 2020-09-02 NOTE — TELEPHONE ENCOUNTER
Acute Migraine assessment:    Is this your typical migraine? Describe any change in character from past migraines.   Yes:     Location of Pain (select all that apply):  right side  # Days pain has been present:  2 days     Describe the pain:  Throbbing

## 2020-09-02 NOTE — TELEPHONE ENCOUNTER
Patient with recent sah with severe headache not responding to imitrex or excedrin. Recommended evaluation in the ER. She expressed full understanding.

## 2020-11-17 ENCOUNTER — TELEPHONE (OUTPATIENT)
Dept: SURGERY | Facility: CLINIC | Age: 50
End: 2020-11-17

## 2020-11-17 NOTE — TELEPHONE ENCOUNTER
lvm (no vmi) to call office    Sami Gillis 2 Front Office             Reminder for TOF MRA head with and with contrast in 6 months (December 2020). Patient to see Dr. Sarah Guzmán following imaging.

## 2020-11-17 NOTE — TELEPHONE ENCOUNTER
Prior authorization request completed for: Brain MRA  Authorization #[id number]     Authorization dates: 11/17/20 - 01/01/21 at [facility]   CPT codes approved: 20469  Number of visits/dates of service approved: 1  Physician: Drew Hughes      Spoke

## 2020-11-17 NOTE — TELEPHONE ENCOUNTER
Pt called back, appt scheduled w/Dr Yadi Devries for 12/10; pt will await PA for MRA & will try to schedule MRA prior to 12/10 appt

## 2020-11-23 DIAGNOSIS — G40.909 SEIZURE DISORDER (HCC): ICD-10-CM

## 2020-11-23 RX ORDER — LEVETIRACETAM 500 MG/1
500 TABLET ORAL NIGHTLY
Qty: 90 TABLET | Refills: 5 | Status: SHIPPED | OUTPATIENT
Start: 2020-11-23 | End: 2021-02-09 | Stop reason: DRUGHIGH

## 2020-11-23 NOTE — TELEPHONE ENCOUNTER
Medication: Levetiracetam 500 mg    Date of last refill: 05/27/20 with 5 addt refills  [place]     Last office visit: 07/13/20  Due back to clinic per last office note:  RTN in 3 months  Date next office visit scheduled:

## 2020-11-30 ENCOUNTER — HOSPITAL ENCOUNTER (OUTPATIENT)
Dept: MRI IMAGING | Age: 50
Discharge: HOME | End: 2020-11-30
Attending: NURSE PRACTITIONER
Payer: COMMERCIAL

## 2020-11-30 ENCOUNTER — TELEPHONE (OUTPATIENT)
Dept: SURGERY | Facility: CLINIC | Age: 50
End: 2020-11-30

## 2020-11-30 DIAGNOSIS — I60.9 SAH (SUBARACHNOID HEMORRHAGE) (HCC): ICD-10-CM

## 2020-11-30 PROCEDURE — 70544 MR ANGIOGRAPHY HEAD W/O DYE: CPT | Performed by: NURSE PRACTITIONER

## 2020-12-10 ENCOUNTER — OFFICE VISIT (OUTPATIENT)
Dept: SURGERY | Facility: CLINIC | Age: 50
End: 2020-12-10
Payer: COMMERCIAL

## 2020-12-10 VITALS — DIASTOLIC BLOOD PRESSURE: 66 MMHG | HEART RATE: 66 BPM | SYSTOLIC BLOOD PRESSURE: 110 MMHG

## 2020-12-10 DIAGNOSIS — I60.9 SAH (SUBARACHNOID HEMORRHAGE) (HCC): Primary | ICD-10-CM

## 2020-12-10 PROCEDURE — 99212 OFFICE O/P EST SF 10 MIN: CPT | Performed by: RADIOLOGY

## 2020-12-10 PROCEDURE — 3074F SYST BP LT 130 MM HG: CPT | Performed by: RADIOLOGY

## 2020-12-10 PROCEDURE — 3078F DIAST BP <80 MM HG: CPT | Performed by: RADIOLOGY

## 2020-12-10 RX ORDER — HYDROCODONE BITARTRATE AND ACETAMINOPHEN 5; 325 MG/1; MG/1
TABLET ORAL
COMMUNITY
Start: 2020-11-09 | End: 2021-02-09

## 2020-12-10 NOTE — PROGRESS NOTES
Patient here for follow-up and review of MRA    Review of Systems:    Hand Dominance: right  General: no symptoms reported  Neuro: seizures  Head: headache  Musculoskeletal: no symptoms reported  Cardiovascular: no symptoms reported  Gastrointestinal: hear

## 2020-12-10 NOTE — PROGRESS NOTES
[address] Patient Status:  No patient class for patient encounter    1970 MRN [id number]   Location ED [facility], [address] [facility] [address], [address] Attending No att. providers found   Hosp Day # 0 PCP Yasmin Dong 1-2 per week, which are sometimes alleviated by Sumitriptan but not always. She also reports feeling \"off balance,\" and \"dizzy\" at times, feeling that she may fall. She denies fall, but is unable to walk for long distances.           HISTORY:  Past Me Negative for dizziness, syncope, speech difficulty and headaches. Psychiatric/Behavioral: Negative  All other systems reviewed and are negative.         Current Outpatient Medications:   •  HYDROcodone-acetaminophen 5-325 MG Oral Tab, TK 1 T PO EVERY 6 H [address] [facility]  12/10/2020, 3:31 PM  Spectre [id number]

## 2021-01-13 NOTE — PROGRESS NOTES
[facility]  Cardiology Progress Note    Subjective:  No chest pain or shortness of breath.   Called by cardiographics department that patient had recurrent syncopal type symptoms while sitting where her head leaned back and she dropped a ball she had b Hi nursing team, can you call patient about repeating the BMP on Friday or Monday. I'll place a new order for CT urogram with contrast only. BENITA Loomis.  extracardiac: There was no pericardial effusion. Telemetry: SR/SB    Assessment:    · Syncope - presented with recurrent falls and reported syncope. Verbalized several episodes prior to presenting.   Had recurrent altered mentation with difficulty to ar

## 2021-01-25 ENCOUNTER — TELEPHONE (OUTPATIENT)
Dept: NEUROLOGY | Facility: CLINIC | Age: 51
End: 2021-01-25

## 2021-01-25 ENCOUNTER — LAB ENCOUNTER (OUTPATIENT)
Dept: LAB | Facility: HOSPITAL | Age: 51
End: 2021-01-25
Attending: Other
Payer: COMMERCIAL

## 2021-01-25 DIAGNOSIS — H02.401 PTOSIS OF RIGHT EYELID: ICD-10-CM

## 2021-01-25 DIAGNOSIS — R51.9 HEADACHE DISORDER: Primary | ICD-10-CM

## 2021-01-25 DIAGNOSIS — R29.898 WEAKNESS OF BOTH LOWER EXTREMITIES: ICD-10-CM

## 2021-01-25 PROCEDURE — 80201 ASSAY OF TOPIRAMATE: CPT

## 2021-01-25 PROCEDURE — 80177 DRUG SCRN QUAN LEVETIRACETAM: CPT

## 2021-01-25 NOTE — TELEPHONE ENCOUNTER
Called patient, instructed her to go get labs drawn for Keppra, Topamax (ordered 7/2021). Held appt slot for 2/9/2021 to see Dr for follow up. Instructed patient to go to ED if symptoms persist or condition deteriorates.

## 2021-01-25 NOTE — TELEPHONE ENCOUNTER
Pt had one seizure on this past Saturday and another on Sunday. Please call her for additional details.

## 2021-01-25 NOTE — TELEPHONE ENCOUNTER
Current Outpatient Medications:   •  HYDROcodone-acetaminophen 5-325 MG Oral Tab, TK 1 T PO EVERY 6 H PRN FOR 10 DAYS, Disp: , Rfl:   •  levETIRAcetam 500 MG Oral Tab, Take 1 tablet (500 mg total) by mouth nightly.  TAKE 1 TABLET BY MOUTH EVERY MORNING AN

## 2021-01-25 NOTE — TELEPHONE ENCOUNTER
Patient experienced two seizures 1/16/2021 and 1/24/2021. Was having neck pain for one week prior to first seizure. Was unable to eat or sleep well. Is med compliant with no missed doses. No alcohol or drug use.     Instructed patient to follow up o

## 2021-01-27 LAB
LEVETIRACETAM (KEPPRA): 24 UG/ML
TOPIRAMATE: 9.5 UG/ML

## 2021-02-09 ENCOUNTER — TELEPHONE (OUTPATIENT)
Dept: NEUROLOGY | Facility: CLINIC | Age: 51
End: 2021-02-09

## 2021-02-09 DIAGNOSIS — G43.701 CHRONIC MIGRAINE WITHOUT AURA WITH STATUS MIGRAINOSUS, NOT INTRACTABLE: Primary | ICD-10-CM

## 2021-02-09 NOTE — PROGRESS NOTES
[facility] with [address]  7/2/1970  Primary Care Provider:  Sascha Nieto MD    2/9/2021  Accompanied visit:      ( x) No.      45year old yo patient being seen for:  S Rfl:   •  topiramate 100 MG Oral Tab, Take 100 mg by mouth 2 (two) times daily.  , Disp: , Rfl:   •  SUMATRIPTAN SUCCINATE 100 MG Oral Tab, TAKE 1 TABLET BY MOUTH DAILY AS NEEDED FOR MIGRAINE., Disp: 9 tablet, Rfl: 5  •  Potassium Chloride ER 20 MEQ Oral Ta going to be obtained. The advantage of using Botox will also be to provide muscle relaxation in the neck muscles. While we are waiting for bowel we are going to put her on Medrol Dosepak and tizanidine at night.       (x) Discussed potential side effect

## 2021-02-15 ENCOUNTER — TELEPHONE (OUTPATIENT)
Dept: NEUROLOGY | Facility: CLINIC | Age: 51
End: 2021-02-15

## 2021-02-15 ENCOUNTER — HOSPITAL ENCOUNTER (OUTPATIENT)
Dept: MRI IMAGING | Age: 51
Discharge: HOME | End: 2021-02-15
Attending: Other
Payer: COMMERCIAL

## 2021-02-15 DIAGNOSIS — M75.02 ADHESIVE CAPSULITIS OF LEFT SHOULDER: ICD-10-CM

## 2021-02-15 DIAGNOSIS — M75.102 ROTATOR CUFF TEAR ARTHROPATHY OF LEFT SHOULDER: ICD-10-CM

## 2021-02-15 DIAGNOSIS — M12.812 ROTATOR CUFF TEAR ARTHROPATHY OF LEFT SHOULDER: ICD-10-CM

## 2021-02-15 PROCEDURE — 73221 MRI JOINT UPR EXTREM W/O DYE: CPT | Performed by: OTHER

## 2021-02-16 NOTE — TELEPHONE ENCOUNTER
It is all in the notes that failed ATI ptx and even corisone injection  But get the peer to peer    Dr Tr Egan

## 2021-02-22 ENCOUNTER — OFFICE VISIT (OUTPATIENT)
Dept: NEUROLOGY | Facility: CLINIC | Age: 51
End: 2021-02-22
Payer: COMMERCIAL

## 2021-02-22 VITALS
RESPIRATION RATE: 16 BRPM | DIASTOLIC BLOOD PRESSURE: 70 MMHG | WEIGHT: 170 LBS | BODY MASS INDEX: 27.32 KG/M2 | SYSTOLIC BLOOD PRESSURE: 122 MMHG | HEART RATE: 74 BPM | HEIGHT: 66 IN

## 2021-02-22 DIAGNOSIS — M79.18 MYOFASCIAL MUSCLE PAIN: Primary | ICD-10-CM

## 2021-02-22 PROCEDURE — 20553 NJX 1/MLT TRIGGER POINTS 3/>: CPT | Performed by: OTHER

## 2021-02-22 PROCEDURE — 3074F SYST BP LT 130 MM HG: CPT | Performed by: OTHER

## 2021-02-22 PROCEDURE — 3008F BODY MASS INDEX DOCD: CPT | Performed by: OTHER

## 2021-02-22 PROCEDURE — 3078F DIAST BP <80 MM HG: CPT | Performed by: OTHER

## 2021-02-22 RX ORDER — TRIAMCINOLONE ACETONIDE 40 MG/ML
40 INJECTION, SUSPENSION INTRA-ARTICULAR; INTRAMUSCULAR ONCE
Status: COMPLETED | OUTPATIENT
Start: 2021-02-22 | End: 2021-02-22

## 2021-02-22 RX ORDER — BUPIVACAINE HYDROCHLORIDE 2.5 MG/ML
8 INJECTION, SOLUTION EPIDURAL; INFILTRATION; INTRACAUDAL ONCE
Status: COMPLETED | OUTPATIENT
Start: 2021-02-22 | End: 2021-02-22

## 2021-02-22 NOTE — PROCEDURES
[facility]  2/22/2021      Procedure:  Trigger Point Injections  NOTES:   Left upper cervical, bilateral upper traps, left interscapular and subscapularis    /70 (BP Location: Right arm, Patient Position: Sitting, Cuff Size: adult

## 2021-03-12 ENCOUNTER — OFFICE VISIT (OUTPATIENT)
Dept: NEUROLOGY | Facility: CLINIC | Age: 51
End: 2021-03-12
Payer: COMMERCIAL

## 2021-03-12 VITALS
HEART RATE: 68 BPM | RESPIRATION RATE: 16 BRPM | HEIGHT: 66 IN | SYSTOLIC BLOOD PRESSURE: 120 MMHG | BODY MASS INDEX: 27.32 KG/M2 | WEIGHT: 170 LBS | DIASTOLIC BLOOD PRESSURE: 74 MMHG

## 2021-03-12 DIAGNOSIS — M79.18 MYOFASCIAL MUSCLE PAIN: Primary | ICD-10-CM

## 2021-03-12 PROCEDURE — 3008F BODY MASS INDEX DOCD: CPT | Performed by: OTHER

## 2021-03-12 PROCEDURE — 3074F SYST BP LT 130 MM HG: CPT | Performed by: OTHER

## 2021-03-12 PROCEDURE — 3078F DIAST BP <80 MM HG: CPT | Performed by: OTHER

## 2021-03-12 PROCEDURE — 20553 NJX 1/MLT TRIGGER POINTS 3/>: CPT | Performed by: OTHER

## 2021-03-12 NOTE — PROGRESS NOTES
[place] [facility]  3/12/2021      Procedure:  Trigger Point Injections  NOTES:   Bilateral upper traps, rhomboid, left infraspinatus and levator angle    /74 (BP Location: Left arm, Patient Position: Sitting, Cuff Size: adult)   Pulse 68

## 2021-03-31 ENCOUNTER — OFFICE VISIT (OUTPATIENT)
Dept: NEUROLOGY | Facility: CLINIC | Age: 51
End: 2021-03-31
Payer: COMMERCIAL

## 2021-03-31 VITALS
HEART RATE: 79 BPM | SYSTOLIC BLOOD PRESSURE: 104 MMHG | RESPIRATION RATE: 18 BRPM | OXYGEN SATURATION: 99 % | BODY MASS INDEX: 27.32 KG/M2 | HEIGHT: 66 IN | WEIGHT: 170 LBS | DIASTOLIC BLOOD PRESSURE: 68 MMHG

## 2021-03-31 DIAGNOSIS — M79.18 MYOFASCIAL MUSCLE PAIN: Primary | ICD-10-CM

## 2021-03-31 PROCEDURE — 3078F DIAST BP <80 MM HG: CPT | Performed by: OTHER

## 2021-03-31 PROCEDURE — 3074F SYST BP LT 130 MM HG: CPT | Performed by: OTHER

## 2021-03-31 PROCEDURE — 20553 NJX 1/MLT TRIGGER POINTS 3/>: CPT | Performed by: OTHER

## 2021-03-31 PROCEDURE — 3008F BODY MASS INDEX DOCD: CPT | Performed by: OTHER

## 2021-03-31 RX ORDER — TRIAMCINOLONE ACETONIDE 40 MG/ML
40 INJECTION, SUSPENSION INTRA-ARTICULAR; INTRAMUSCULAR ONCE
Status: COMPLETED | OUTPATIENT
Start: 2021-03-31 | End: 2021-03-31

## 2021-03-31 RX ORDER — BUPIVACAINE HYDROCHLORIDE 2.5 MG/ML
8 INJECTION, SOLUTION EPIDURAL; INFILTRATION; INTRACAUDAL ONCE
Status: COMPLETED | OUTPATIENT
Start: 2021-03-31 | End: 2021-03-31

## 2021-03-31 NOTE — PROGRESS NOTES
[address] [place] [address] with [facility]  3/31/2021    9:48 AM      In the beginning of Feb because of the complaint stated below     She continues to have the spells where her neck would tonically ask hyperextend.

## 2021-03-31 NOTE — PROCEDURES
[facility]  3/31/2021      Procedure:  Trigger Point Injections  NOTES:   Left upper trapezius left rhomboid and left infraspinatus were identified as the active trigger points and injected with solution below.     /68   Pulse 79

## 2021-04-08 ENCOUNTER — IMMUNIZATION (OUTPATIENT)
Dept: LAB | Age: 51
End: 2021-04-08
Attending: HOSPITALIST
Payer: COMMERCIAL

## 2021-04-08 DIAGNOSIS — Z23 NEED FOR VACCINATION: Primary | ICD-10-CM

## 2021-04-08 PROCEDURE — 0001A SARSCOV2 VAC 30MCG/0.3ML IM: CPT

## 2021-04-09 ENCOUNTER — TELEPHONE (OUTPATIENT)
Dept: SURGERY | Facility: CLINIC | Age: 51
End: 2021-04-09

## 2021-04-09 DIAGNOSIS — G43.001 MIGRAINE WITHOUT AURA AND WITH STATUS MIGRAINOSUS, NOT INTRACTABLE: Primary | ICD-10-CM

## 2021-04-09 NOTE — TELEPHONE ENCOUNTER
Please send a prescription to [place]:    Botox 200 units, 3 refills      Si units to be administered by MD into multiple sites of head, neck and scalp every 3 months for chronic migraine prophylaxis.        Approval #[id number] from 21-10

## 2021-04-12 RX ORDER — ONABOTULINUMTOXINA 200 [USP'U]/1
INJECTION, POWDER, LYOPHILIZED, FOR SOLUTION INTRADERMAL; INTRAMUSCULAR
Qty: 200 UNITS | Refills: 3 | Status: SHIPPED | OUTPATIENT
Start: 2021-04-12 | End: 2021-06-03

## 2021-04-17 ENCOUNTER — LAB ENCOUNTER (OUTPATIENT)
Dept: LAB | Facility: HOSPITAL | Age: 51
End: 2021-04-17
Attending: SPECIALIST
Payer: COMMERCIAL

## 2021-04-17 DIAGNOSIS — R53.83 FATIGUE: Primary | ICD-10-CM

## 2021-04-17 DIAGNOSIS — Z13.1 SCREENING FOR DIABETES MELLITUS: ICD-10-CM

## 2021-04-17 DIAGNOSIS — Z13.29 SCREENING FOR THYROID DISORDER: ICD-10-CM

## 2021-04-17 DIAGNOSIS — Z00.00 ROUTINE GENERAL MEDICAL EXAMINATION AT A HEALTH CARE FACILITY: ICD-10-CM

## 2021-04-17 PROCEDURE — 36415 COLL VENOUS BLD VENIPUNCTURE: CPT

## 2021-04-17 PROCEDURE — 85025 COMPLETE CBC W/AUTO DIFF WBC: CPT

## 2021-04-17 PROCEDURE — 80061 LIPID PANEL: CPT

## 2021-04-17 PROCEDURE — 84238 ASSAY NONENDOCRINE RECEPTOR: CPT

## 2021-04-17 PROCEDURE — 80053 COMPREHEN METABOLIC PANEL: CPT

## 2021-04-17 PROCEDURE — 84439 ASSAY OF FREE THYROXINE: CPT

## 2021-04-17 PROCEDURE — 83519 RIA NONANTIBODY: CPT

## 2021-04-17 PROCEDURE — 84443 ASSAY THYROID STIM HORMONE: CPT

## 2021-04-17 PROCEDURE — 83036 HEMOGLOBIN GLYCOSYLATED A1C: CPT

## 2021-04-17 PROCEDURE — 82533 TOTAL CORTISOL: CPT

## 2021-04-17 PROCEDURE — 83516 IMMUNOASSAY NONANTIBODY: CPT

## 2021-04-26 ENCOUNTER — OFFICE VISIT (OUTPATIENT)
Dept: NEUROLOGY | Facility: CLINIC | Age: 51
End: 2021-04-26
Payer: COMMERCIAL

## 2021-04-26 VITALS
HEIGHT: 66 IN | SYSTOLIC BLOOD PRESSURE: 100 MMHG | BODY MASS INDEX: 27.32 KG/M2 | WEIGHT: 170 LBS | HEART RATE: 64 BPM | RESPIRATION RATE: 16 BRPM | DIASTOLIC BLOOD PRESSURE: 68 MMHG | OXYGEN SATURATION: 98 %

## 2021-04-26 DIAGNOSIS — R53.1 WEAKNESS: ICD-10-CM

## 2021-04-26 DIAGNOSIS — M79.18 MYOFASCIAL MUSCLE PAIN: Primary | ICD-10-CM

## 2021-04-26 DIAGNOSIS — H02.403 DROOPING EYELID DISEASE, BILATERAL: ICD-10-CM

## 2021-04-26 PROCEDURE — 3008F BODY MASS INDEX DOCD: CPT | Performed by: OTHER

## 2021-04-26 PROCEDURE — 3078F DIAST BP <80 MM HG: CPT | Performed by: OTHER

## 2021-04-26 PROCEDURE — 99213 OFFICE O/P EST LOW 20 MIN: CPT | Performed by: OTHER

## 2021-04-26 PROCEDURE — 3074F SYST BP LT 130 MM HG: CPT | Performed by: OTHER

## 2021-04-26 PROCEDURE — 20552 NJX 1/MLT TRIGGER POINT 1/2: CPT | Performed by: OTHER

## 2021-04-26 RX ORDER — TRIAMCINOLONE ACETONIDE 40 MG/ML
40 INJECTION, SUSPENSION INTRA-ARTICULAR; INTRAMUSCULAR ONCE
Status: COMPLETED | OUTPATIENT
Start: 2021-04-26 | End: 2021-04-26

## 2021-04-26 RX ORDER — PYRIDOSTIGMINE BROMIDE 60 MG/1
30 TABLET ORAL 3 TIMES DAILY
Qty: 90 TABLET | Refills: 3 | Status: SHIPPED | OUTPATIENT
Start: 2021-04-26

## 2021-04-26 RX ORDER — BUPIVACAINE HYDROCHLORIDE 2.5 MG/ML
8 INJECTION, SOLUTION EPIDURAL; INFILTRATION; INTRACAUDAL ONCE
Status: COMPLETED | OUTPATIENT
Start: 2021-04-26 | End: 2021-04-26

## 2021-04-26 NOTE — PROCEDURES
[facility]  4/26/2021      Procedure:  Trigger Point Injections  NOTES:   Bilateral lower cervical paraspinal      /68   Pulse 64   Resp 16   Ht 66\"   Wt 170 lb (77.1 kg)   LMP 02/23/2012   SpO2 98%   BMI 27.44 kg/m²   PROCEDURE

## 2021-04-26 NOTE — PROGRESS NOTES
[address] [facility] with [facility]  4/26/2021    2:33 PM      Weakness neck  Also still in pain but TPI has helped    Mestinon to be started at a low dose    Zenia Dowling MD  Vascular & General Neurology  D

## 2021-04-29 ENCOUNTER — IMMUNIZATION (OUTPATIENT)
Dept: LAB | Age: 51
End: 2021-04-29
Attending: HOSPITALIST
Payer: COMMERCIAL

## 2021-04-29 DIAGNOSIS — Z23 NEED FOR VACCINATION: Primary | ICD-10-CM

## 2021-04-29 PROCEDURE — 0002A SARSCOV2 VAC 30MCG/0.3ML IM: CPT

## 2021-05-27 ENCOUNTER — TELEPHONE (OUTPATIENT)
Dept: NEUROLOGY | Facility: CLINIC | Age: 51
End: 2021-05-27

## 2021-05-27 NOTE — TELEPHONE ENCOUNTER
S: Pt has increased symptoms. B: Lumbar and cervical radiculopathy    A: Pain in the back of her neck has increased. Right eye is drooping more than normal. Pt is extremely exhausted. R: RN will route to provider for recommendation.

## 2021-06-03 ENCOUNTER — OFFICE VISIT (OUTPATIENT)
Dept: NEUROLOGY | Facility: CLINIC | Age: 51
End: 2021-06-03
Payer: COMMERCIAL

## 2021-06-03 VITALS
DIASTOLIC BLOOD PRESSURE: 70 MMHG | RESPIRATION RATE: 16 BRPM | HEART RATE: 72 BPM | HEIGHT: 66 IN | BODY MASS INDEX: 27.32 KG/M2 | WEIGHT: 170 LBS | SYSTOLIC BLOOD PRESSURE: 110 MMHG

## 2021-06-03 DIAGNOSIS — M79.18 MYOFASCIAL MUSCLE PAIN: Primary | ICD-10-CM

## 2021-06-03 PROCEDURE — 3008F BODY MASS INDEX DOCD: CPT | Performed by: OTHER

## 2021-06-03 PROCEDURE — 3074F SYST BP LT 130 MM HG: CPT | Performed by: OTHER

## 2021-06-03 PROCEDURE — 20553 NJX 1/MLT TRIGGER POINTS 3/>: CPT | Performed by: OTHER

## 2021-06-03 PROCEDURE — 3078F DIAST BP <80 MM HG: CPT | Performed by: OTHER

## 2021-06-03 RX ORDER — BUPIVACAINE HYDROCHLORIDE 2.5 MG/ML
8 INJECTION, SOLUTION EPIDURAL; INFILTRATION; INTRACAUDAL ONCE
Status: COMPLETED | OUTPATIENT
Start: 2021-06-03 | End: 2021-06-03

## 2021-06-03 RX ORDER — CELECOXIB 200 MG/1
200 CAPSULE ORAL DAILY
COMMUNITY

## 2021-06-03 RX ORDER — TRIAMCINOLONE ACETONIDE 40 MG/ML
40 INJECTION, SUSPENSION INTRA-ARTICULAR; INTRAMUSCULAR ONCE
Status: COMPLETED | OUTPATIENT
Start: 2021-06-03 | End: 2021-06-03

## 2021-06-04 NOTE — PROGRESS NOTES
[facility]  6/3/2021      Procedure:  Trigger Point Injections  NOTES:   Bilateral upper trapezius,  Bilateral levator scapula and Rhomboids    /70 (BP Location: Left arm, Patient Position: Sitting, Cuff Size: adult)   Pulse 72

## 2021-06-18 RX ORDER — LEVETIRACETAM 500 MG/1
TABLET ORAL
Qty: 90 TABLET | Refills: 0 | Status: SHIPPED | OUTPATIENT
Start: 2021-06-18 | End: 2021-07-29

## 2021-06-18 NOTE — TELEPHONE ENCOUNTER
Medication: levetiracetam 500 mg oral tab    Date of last refill: na (#90/0)  [place]    Last office visit: 4/6/2021  Due back to clinic per last office note: 4 weeks  Date next office visit scheduled:    Future Appoi

## 2021-07-29 RX ORDER — LEVETIRACETAM 500 MG/1
TABLET ORAL
Qty: 270 TABLET | Refills: 1 | Status: SHIPPED | OUTPATIENT
Start: 2021-07-29

## 2021-07-29 NOTE — TELEPHONE ENCOUNTER
Medication: levetiracetam 500 mg oral tab     Date of last refill: 06/18/21  [place]     Last office visit: 06/03/21  Due back to clinic per last office note: 4 weeks  Date next office visit scheduled:         Last OV

## 2021-09-23 ENCOUNTER — HOSPITAL ENCOUNTER (EMERGENCY)
Facility: HOSPITAL | Age: 51
Discharge: HOME | End: 2021-09-24
Attending: EMERGENCY MEDICINE
Payer: COMMERCIAL

## 2021-09-23 DIAGNOSIS — S63.501A SPRAIN OF RIGHT WRIST, INITIAL ENCOUNTER: ICD-10-CM

## 2021-09-23 DIAGNOSIS — S16.1XXA STRAIN OF NECK MUSCLE, INITIAL ENCOUNTER: Primary | ICD-10-CM

## 2021-09-23 DIAGNOSIS — S29.012A STRAIN OF THORACIC SPINE: ICD-10-CM

## 2021-09-23 PROCEDURE — 99284 EMERGENCY DEPT VISIT MOD MDM: CPT

## 2021-09-23 RX ORDER — HYDROCODONE BITARTRATE AND ACETAMINOPHEN 5; 325 MG/1; MG/1
2 TABLET ORAL ONCE
Status: COMPLETED | OUTPATIENT
Start: 2021-09-23 | End: 2021-09-24

## 2021-09-23 RX ORDER — IBUPROFEN 600 MG/1
600 TABLET ORAL ONCE
Status: COMPLETED | OUTPATIENT
Start: 2021-09-23 | End: 2021-09-24

## 2021-09-24 ENCOUNTER — APPOINTMENT (OUTPATIENT)
Dept: GENERAL RADIOLOGY | Facility: HOSPITAL | Age: 51
End: 2021-09-24
Attending: EMERGENCY MEDICINE
Payer: COMMERCIAL

## 2021-09-24 VITALS
WEIGHT: 175 LBS | DIASTOLIC BLOOD PRESSURE: 77 MMHG | OXYGEN SATURATION: 99 % | BODY MASS INDEX: 28.13 KG/M2 | TEMPERATURE: 97 F | HEART RATE: 81 BPM | RESPIRATION RATE: 19 BRPM | SYSTOLIC BLOOD PRESSURE: 120 MMHG | HEIGHT: 66 IN

## 2021-09-24 PROCEDURE — 72040 X-RAY EXAM NECK SPINE 2-3 VW: CPT | Performed by: EMERGENCY MEDICINE

## 2021-09-24 PROCEDURE — 73130 X-RAY EXAM OF HAND: CPT | Performed by: EMERGENCY MEDICINE

## 2021-09-24 PROCEDURE — 72072 X-RAY EXAM THORAC SPINE 3VWS: CPT | Performed by: EMERGENCY MEDICINE

## 2021-09-24 PROCEDURE — 71045 X-RAY EXAM CHEST 1 VIEW: CPT | Performed by: EMERGENCY MEDICINE

## 2021-09-24 RX ORDER — HYDROCODONE BITARTRATE AND ACETAMINOPHEN 5; 325 MG/1; MG/1
1-2 TABLET ORAL EVERY 6 HOURS PRN
Qty: 20 TABLET | Refills: 0 | Status: SHIPPED | OUTPATIENT
Start: 2021-09-24 | End: 2021-09-27
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
## 2021-10-06 RX ORDER — SUMATRIPTAN 100 MG/1
TABLET, FILM COATED ORAL
Qty: 9 TABLET | Refills: 5 | Status: SHIPPED | OUTPATIENT
Start: 2021-10-06

## 2021-10-06 NOTE — TELEPHONE ENCOUNTER
Medication: Sumatriptan 100 mg oral tab     Date of last refill: na:10/23/2019 with 5 addt refills  [place]     Last office visit: 06/03/21  Due back to clinic per last office note: 4 weeks  Date next office visit germain

## 2021-10-29 ENCOUNTER — IMMUNIZATION (OUTPATIENT)
Dept: LAB | Facility: HOSPITAL | Age: 51
End: 2021-10-29
Attending: EMERGENCY MEDICINE
Payer: COMMERCIAL

## 2021-10-29 DIAGNOSIS — Z23 NEED FOR VACCINATION: Primary | ICD-10-CM

## 2021-10-29 PROCEDURE — 0004A SARSCOV2 VAC 30MCG/0.3ML IM: CPT

## 2022-02-14 ENCOUNTER — OFFICE VISIT (OUTPATIENT)
Dept: NEUROLOGY | Facility: CLINIC | Age: 52
End: 2022-02-14
Payer: COMMERCIAL

## 2022-02-14 VITALS
WEIGHT: 175 LBS | HEART RATE: 87 BPM | DIASTOLIC BLOOD PRESSURE: 76 MMHG | SYSTOLIC BLOOD PRESSURE: 112 MMHG | RESPIRATION RATE: 16 BRPM | HEIGHT: 66 IN | BODY MASS INDEX: 28.13 KG/M2 | OXYGEN SATURATION: 98 %

## 2022-02-14 DIAGNOSIS — F44.5 PSEUDOSEIZURE: ICD-10-CM

## 2022-02-14 DIAGNOSIS — H02.401 PTOSIS OF EYELID, RIGHT: ICD-10-CM

## 2022-02-14 DIAGNOSIS — G43.701 CHRONIC MIGRAINE WITHOUT AURA WITH STATUS MIGRAINOSUS, NOT INTRACTABLE: Primary | ICD-10-CM

## 2022-02-14 PROCEDURE — 3078F DIAST BP <80 MM HG: CPT | Performed by: PHYSICIAN ASSISTANT

## 2022-02-14 PROCEDURE — 3008F BODY MASS INDEX DOCD: CPT | Performed by: PHYSICIAN ASSISTANT

## 2022-02-14 PROCEDURE — 99214 OFFICE O/P EST MOD 30 MIN: CPT | Performed by: PHYSICIAN ASSISTANT

## 2022-02-14 PROCEDURE — 3074F SYST BP LT 130 MM HG: CPT | Performed by: PHYSICIAN ASSISTANT

## 2022-02-21 RX ORDER — LEVETIRACETAM 500 MG/1
TABLET ORAL
Qty: 270 TABLET | Refills: 1 | Status: SHIPPED | OUTPATIENT
Start: 2022-02-21

## 2022-02-21 NOTE — TELEPHONE ENCOUNTER
Medication: LEVETIRACETAM 500 MG     Date of last refill: 7/29/21 (#270/1R)  [place]     Last office visit: 2/14/22  Due back to clinic per last office note:  no mention  Date next office visit scheduled:    Future Appointments   Date Time Provider Shelby Bang   4/1/2022 10:00 AM MD HEAVENLY Christopher [facility]           Last OV note recommendation:    Note shows no mention of A/P

## 2022-02-22 ENCOUNTER — TELEPHONE (OUTPATIENT)
Dept: NEUROLOGY | Facility: CLINIC | Age: 52
End: 2022-02-22

## 2022-04-07 ENCOUNTER — LAB ENCOUNTER (OUTPATIENT)
Dept: LAB | Facility: HOSPITAL | Age: 52
End: 2022-04-07
Attending: Other
Payer: COMMERCIAL

## 2022-04-07 DIAGNOSIS — G43.701 CHRONIC MIGRAINE WITHOUT AURA WITH STATUS MIGRAINOSUS, NOT INTRACTABLE: ICD-10-CM

## 2022-04-07 DIAGNOSIS — H02.401 PTOSIS OF EYELID, RIGHT: ICD-10-CM

## 2022-04-07 DIAGNOSIS — R53.1 WEAKNESS: ICD-10-CM

## 2022-04-07 PROCEDURE — 85652 RBC SED RATE AUTOMATED: CPT | Performed by: OTHER

## 2022-04-07 PROCEDURE — 83516 IMMUNOASSAY NONANTIBODY: CPT

## 2022-04-07 PROCEDURE — 86255 FLUORESCENT ANTIBODY SCREEN: CPT

## 2022-04-07 PROCEDURE — 82550 ASSAY OF CK (CPK): CPT | Performed by: OTHER

## 2022-04-07 PROCEDURE — 84238 ASSAY NONENDOCRINE RECEPTOR: CPT

## 2022-04-07 PROCEDURE — 83519 RIA NONANTIBODY: CPT

## 2022-04-07 PROCEDURE — 36415 COLL VENOUS BLD VENIPUNCTURE: CPT | Performed by: OTHER

## 2022-04-12 LAB
ACETYLCHOLINE BINDING AB: 0.1 NMOL/L
ACETYLCHOLINE BLOCKING AB: 2 %
TITIN ANTIBODY: <0.09 IV

## 2022-11-03 NOTE — PLAN OF CARE
Problem: Impaired Functional Mobility  Goal: Achieve highest/safest level of mobility/gait  Description  Interventions:  - Assess patient's functional ability and stability  - Promote increasing activity/tolerance for mobility and gait  - Educate and eng US shows no evident mass lesion of fluid collection as demonstrated on MRI. No aspiration ot biopsy possible

## 2022-12-28 ENCOUNTER — HOSPITAL ENCOUNTER (OUTPATIENT)
Dept: BONE DENSITY | Age: 52
Discharge: HOME | End: 2022-12-28
Attending: SPECIALIST
Payer: COMMERCIAL

## 2022-12-28 ENCOUNTER — HOSPITAL ENCOUNTER (OUTPATIENT)
Dept: MAMMOGRAPHY | Age: 52
Discharge: HOME | End: 2022-12-28
Attending: SPECIALIST
Payer: COMMERCIAL

## 2022-12-28 DIAGNOSIS — N95.9 AT RISK FOR OSTEOPOROSIS IN PREMENOPAUSAL PATIENT: ICD-10-CM

## 2022-12-28 DIAGNOSIS — Z12.31 SCREENING MAMMOGRAM FOR BREAST CANCER: ICD-10-CM

## 2022-12-28 DIAGNOSIS — Z91.89 AT RISK FOR OSTEOPOROSIS IN PREMENOPAUSAL PATIENT: ICD-10-CM

## 2022-12-28 PROCEDURE — 77067 SCR MAMMO BI INCL CAD: CPT | Performed by: SPECIALIST

## 2022-12-28 PROCEDURE — 77063 BREAST TOMOSYNTHESIS BI: CPT | Performed by: SPECIALIST

## 2022-12-28 PROCEDURE — 77080 DXA BONE DENSITY AXIAL: CPT | Performed by: SPECIALIST

## 2023-03-05 NOTE — TELEPHONE ENCOUNTER
Medication: Keppra     Date of last refill: 6/21/2015  [place]    Last office visit: 7/87/2016  Due back to clinic per last office note:  RTC in 6 months  Date next office visit scheduled:  Future Pt tolerated popsicle at this time. Denies dizziness and nausea at this time.

## 2023-04-20 ENCOUNTER — OFFICE VISIT (OUTPATIENT)
Dept: FAMILY MEDICINE CLINIC | Facility: CLINIC | Age: 53
End: 2023-04-20
Payer: COMMERCIAL

## 2023-04-20 VITALS
SYSTOLIC BLOOD PRESSURE: 131 MMHG | RESPIRATION RATE: 26 BRPM | WEIGHT: 165 LBS | OXYGEN SATURATION: 97 % | HEIGHT: 66 IN | BODY MASS INDEX: 26.52 KG/M2 | TEMPERATURE: 98 F | HEART RATE: 91 BPM | DIASTOLIC BLOOD PRESSURE: 82 MMHG

## 2023-04-20 DIAGNOSIS — R06.02 SHORTNESS OF BREATH: Primary | ICD-10-CM

## 2023-04-20 PROCEDURE — 99213 OFFICE O/P EST LOW 20 MIN: CPT | Performed by: NURSE PRACTITIONER

## 2023-04-20 PROCEDURE — 3075F SYST BP GE 130 - 139MM HG: CPT | Performed by: NURSE PRACTITIONER

## 2023-04-20 PROCEDURE — 3079F DIAST BP 80-89 MM HG: CPT | Performed by: NURSE PRACTITIONER

## 2023-04-20 PROCEDURE — 3008F BODY MASS INDEX DOCD: CPT | Performed by: NURSE PRACTITIONER

## 2023-09-02 ENCOUNTER — HOSPITAL ENCOUNTER (OUTPATIENT)
Dept: GENERAL RADIOLOGY | Facility: HOSPITAL | Age: 53
Discharge: HOME | End: 2023-09-02
Attending: SPECIALIST
Payer: COMMERCIAL

## 2023-09-02 DIAGNOSIS — R52 PAIN: ICD-10-CM

## 2023-09-02 PROCEDURE — 73523 X-RAY EXAM HIPS BI 5/> VIEWS: CPT | Performed by: SPECIALIST

## 2023-09-18 NOTE — TELEPHONE ENCOUNTER
S: Pain continues in right heel/lower back    B: Last seen 3/2/18 with notes:  Treatment:  Celebrex 200 mg BID x 5 days then use prn and see what happens to pain.   Thoracic pain probably radicular and heel is likely due to DJD     Discuss with Dr Conner trinh
Spoke with patient, OK to try MDP and let us know
Yes

## 2025-01-22 ENCOUNTER — OFFICE VISIT (OUTPATIENT)
Dept: NEUROLOGY | Facility: CLINIC | Age: 55
End: 2025-01-22
Payer: COMMERCIAL

## 2025-01-22 VITALS
DIASTOLIC BLOOD PRESSURE: 80 MMHG | HEIGHT: 66 IN | HEART RATE: 86 BPM | BODY MASS INDEX: 27 KG/M2 | OXYGEN SATURATION: 95 % | RESPIRATION RATE: 14 BRPM | SYSTOLIC BLOOD PRESSURE: 114 MMHG

## 2025-01-22 DIAGNOSIS — G44.099 OTHER TRIGEMINAL AUTONOMIC CEPHALGIA (TAC), NOT INTRACTABLE: ICD-10-CM

## 2025-01-22 DIAGNOSIS — G40.909 SEIZURE DISORDER (HCC): Primary | ICD-10-CM

## 2025-01-22 PROCEDURE — 99214 OFFICE O/P EST MOD 30 MIN: CPT | Performed by: OTHER

## 2025-01-22 PROCEDURE — 3074F SYST BP LT 130 MM HG: CPT | Performed by: OTHER

## 2025-01-22 PROCEDURE — 3079F DIAST BP 80-89 MM HG: CPT | Performed by: OTHER

## 2025-01-22 RX ORDER — PYRIDOSTIGMINE BROMIDE 180 MG/1
180 TABLET, EXTENDED RELEASE ORAL DAILY
COMMUNITY
Start: 2024-12-10

## 2025-01-22 RX ORDER — LEVETIRACETAM 750 MG/1
750 TABLET ORAL EVERY 12 HOURS
COMMUNITY
Start: 2024-12-31

## 2025-01-22 RX ORDER — LEVETIRACETAM 1000 MG/1
1000 TABLET ORAL 2 TIMES DAILY
COMMUNITY
Start: 2025-01-20

## 2025-01-22 RX ORDER — BUDESONIDE AND FORMOTEROL FUMARATE DIHYDRATE 160; 4.5 UG/1; UG/1
2 AEROSOL RESPIRATORY (INHALATION) 2 TIMES DAILY
COMMUNITY
Start: 2024-11-22

## 2025-01-22 RX ORDER — INDOMETHACIN 25 MG/1
25 CAPSULE ORAL 3 TIMES DAILY PRN
Qty: 30 CAPSULE | Refills: 3 | Status: SHIPPED | OUTPATIENT
Start: 2025-01-22

## 2025-01-22 RX ORDER — TOPIRAMATE 50 MG/1
100 TABLET, FILM COATED ORAL 2 TIMES DAILY
COMMUNITY
Start: 2025-01-02

## 2025-01-22 RX ORDER — METFORMIN HYDROCHLORIDE 500 MG/1
1000 TABLET, EXTENDED RELEASE ORAL
COMMUNITY
Start: 2025-01-05

## 2025-01-22 RX ORDER — RISEDRONATE SODIUM 35 MG/1
TABLET, FILM COATED ORAL
COMMUNITY
Start: 2024-11-22

## 2025-01-22 RX ORDER — FLUTICASONE PROPIONATE AND SALMETEROL 250; 50 UG/1; UG/1
1 POWDER RESPIRATORY (INHALATION) 2 TIMES DAILY
COMMUNITY
Start: 2024-11-23

## 2025-01-22 NOTE — PROGRESS NOTES
NEUROLOGY  [facility]       Elham Sotelo  7/2/1970  Primary Care Provider:  Phillip Hawthorne MD    1/22/2025  54 year old yo,  was last seen on:: April 2022    Seen for/plans last visit:  Problem/s Identified this visit:   Pseudoseizure  (primary encounter diagnosis)  Weakness  Chronic migraine without aura with status migrainosus, not intractable  Ptosis of eyelid, right       Previous visit and existing record notes reviewed in preparation for the face to face visit.  Relevant labs and studies reviewed and will be noted in relevant areas of this record.  Accompanied visit:     (x) No.      Present condition:  Since the last visit she had been well but in December, had a spell  Was working  and had a \"seizure\"    Dr Hawthorne put her back on Keppra because at that point was no longer taking medication  Seizure like \"loss of awareness\" but at that time was tired all the time. Jan had passed out at home. Jan 16, was at work, had aura cannot speak, then stops breathing and had to call ambulance taken of [employer], additional dose of Keppra and increased dose      Past History update/new problem(s): as noted above    Review of Systems:  Review of Systems:  Denies systemic symptoms     No CP or SOB.  No GI or  symptoms. Relevant Neuro as noted above.      Medications:      Current Outpatient Medications:     metFORMIN  MG Oral Tablet 24 Hr, Take 2 tablets (1,000 mg total) by mouth daily with dinner., Disp: , Rfl:     levetiracetam 1000 MG Oral Tab, Take 1 tablet (1,000 mg total) by mouth 2 (two) times daily., Disp: , Rfl:     pyridostigmine  MG Oral Tab CR, Take 1 tablet (180 mg total) by mouth daily., Disp: , Rfl:     topiramate 50 MG Oral Tab, Take 2 tablets (100 mg total) by mouth 2 (two) times daily., Disp: , Rfl:     indomethacin 25 MG Oral Cap, Take 1 capsule (25 mg total) by mouth 3 (three) times daily as needed., Disp: 30 capsule, Rfl: 3     Budesonide-Formoterol Fumarate 160-4.5 MCG/ACT Inhalation Aerosol, Inhale 2 puffs into the lungs 2 (two) times daily. (Patient not taking: Reported on 1/22/2025), Disp: , Rfl:     WIXELA INHUB 250-50 MCG/ACT Inhalation Aerosol Powder, Breath Activated, Inhale 1 puff into the lungs 2 (two) times daily. (Patient not taking: Reported on 1/22/2025), Disp: , Rfl:     risedronate 35 MG Oral Tab, , Disp: , Rfl:     levetiracetam 750 MG Oral Tab, Take 1 tablet (750 mg total) by mouth Q12H. (Patient not taking: Reported on 1/22/2025), Disp: , Rfl:     predniSONE 20 MG Oral Tab, Take 1 tablet (20 mg total) by mouth daily. (Patient not taking: Reported on 1/22/2025), Disp: 30 tablet, Rfl: 3    SUMATRIPTAN SUCCINATE 100 MG Oral Tab, TAKE 1 TABLET BY MOUTH DAILY AS NEEDED FOR MIGRAINE. (Patient not taking: Reported on 1/22/2025), Disp: 9 tablet, Rfl: 5  PRN:     Allergies:  Allergies[1]       EXAM:  /80   Pulse 86   Resp 14   Ht 66\"   LMP 02/23/2012 (LMP Unknown)   SpO2 95%   BMI 26.63 kg/m²   Looks stated age  General Exam:  HENT:  pink conjunctiva anicteric sclerae  Neck no adenopathy, thyroid normal  Heart and Lungs:  normal  Extremities: no cyanosis, skin changes    NEURO  NEURO  Able to relate events with fluent speech and intact comprehension  CN 2-12: pupils reactive, VF full face symmetric sensation and movement tongue midline  No motor focal findings  Sensory: no lateralizing findings  Reflexes are symmetric  UMN signs: none  Gait: narrow based          INTERPRETATION of RELEVANT LABS and other DATA:          Problem/s Identified this visit:   1. Seizure disorder (HCC)    2. Other trigeminal autonomic cephalgia (TAC), not intractable          Discussion plus Diagnostics & Treatment Orders:  Orders Placed This Encounter    Budesonide-Formoterol Fumarate 160-4.5 MCG/ACT Inhalation Aerosol     Sig: Inhale 2 puffs into the lungs 2 (two) times daily.    WIXELA INHUB 250-50 MCG/ACT Inhalation Aerosol Powder,  Breath Activated     Sig: Inhale 1 puff into the lungs 2 (two) times daily.    metFORMIN  MG Oral Tablet 24 Hr     Sig: Take 2 tablets (1,000 mg total) by mouth daily with dinner.    risedronate 35 MG Oral Tab    levetiracetam 1000 MG Oral Tab     Sig: Take 1 tablet (1,000 mg total) by mouth 2 (two) times daily.    levetiracetam 750 MG Oral Tab     Sig: Take 1 tablet (750 mg total) by mouth Q12H.    pyridostigmine  MG Oral Tab CR     Sig: Take 1 tablet (180 mg total) by mouth daily.    topiramate 50 MG Oral Tab     Sig: Take 2 tablets (100 mg total) by mouth 2 (two) times daily.    indomethacin 25 MG Oral Cap     Sig: Take 1 capsule (25 mg total) by mouth 3 (three) times daily as needed.     Dispense:  30 capsule     Refill:  3       EEG to be done  Wait for KEPPRA level        (x) Discussed potential side effects of any treatment relevant to above.  Includes explanation of tests as necessary.        Patient understands that if needed, based on condition and or test results, follow up will be readjusted      Malachi Brenner MD  Vascular & General Neurology  Director, Multiple Sclerosis Program  [facility]  1/22/2025, Time completed 1:35 PM    Decision making:  ( x ) labs reviewed/ordered - 1  (  ) new diagnosis: - 1  ( x) Images & studies independently reviewed -non F2F  (  ) Case/studies discussed with other caregivers - -non F2F  (  ) Telephone time with patiern or authorized Fam member--non F2F  ( x ) other records reviewed --non F2F including consultations  (  ) [facility] meetings - patient not present --non F2F  (  ) Independent Historian obtained    Non Face to Face CPT code 10394/41190 applies as documented above    PROCEDURE DONE     (   ) see notes        After visit, patient was escorted out and handed-off discharge process and instructions to the check out desk.  No additional issues relevant to visit were raised to staff at this time interval.        This document is to be  interpreted as my current opinion regarding the case as of the stated date of service based on the information available to me at this time and may supersedes any prior opinion expressed either orally or in writing.  Services rendered are only within the scope of direct medical care  Sometimes, reports may have been prepared partially using a speech recognition software technology.  If a word or phrase is confusing or out of context, please do not hesitate to call for clarification.              [1]   Allergies  Allergen Reactions    Pcns [Penicillins] RASH

## 2025-01-22 NOTE — PATIENT INSTRUCTIONS
Refill policies:    Allow 2-3 business days for refills; controlled substances may take longer.  Contact your pharmacy at least 5 days prior to running out of medication and have them send an electronic request or submit request through the “request refill” option in your [email or web address] account.  Refills are not addressed on weekends; covering physicians do not authorize routine medications on weekends.  No narcotics or controlled substances are refilled after noon on Fridays or by on call physicians.  By law, narcotics must be electronically prescribed.  A 30 day supply with no refills is the maximum allowed.  If your prescription is due for a refill, you may be due for a follow up appointment.  To best provide you care, patients receiving routine medications need to be seen at least once a year.  Patients receiving narcotic/controlled substance medications need to be seen at least once every 3 months.  In the event that your preferred pharmacy does not have the requested medication in stock (e.g. Backordered), it is your responsibility to find another pharmacy that has the requested medication available.  We will gladly send a new prescription to that pharmacy at your request.    Scheduling Tests:    If your physician has ordered radiology tests such as MRI or CT scans, please contact Central Scheduling at [phone] right away to schedule the test.  Once scheduled, the [facility] Centralized Referral Team will work with your insurance carrier to obtain pre-certification or prior authorization.  Depending on your insurance carrier, approval may take 3-10 days.  It is highly recommended patients assure they have received an authorization before having a test performed.  If test is done without insurance authorization, patient may be responsible for the entire amount billed.      Precertification and Prior Authorizations:  If your physician has recommended that you have a procedure or additional testing performed the [facility]  Centralized Referral Team will contact your insurance carrier to obtain pre-certification or prior authorization.    You are strongly encouraged to contact your insurance carrier to verify that your procedure/test has been approved and is a COVERED benefit.  Although the [facility] Centralized Referral Team does its due diligence, the insurance carrier gives the disclaimer that \"Although the procedure is authorized, this does not guarantee payment.\"    Ultimately the patient is responsible for payment.   Thank you for your understanding in this matter.  Paperwork Completion:  If you require FMLA or disability paperwork for your recovery, please make sure to either drop it off or have it faxed to our office at [phone]. Be sure the form has your name and date of birth on it.  The form will be faxed to our Forms Department and they will complete it for you.  There is a 25$ fee for all forms that need to be filled out.  Please be aware there is a 10-14 day turnaround time.  You will need to sign a release of information (SEKOU) form if your paperwork does not come with one.  You may call the Forms Department with any questions at [phone].  Their fax number is [phone].

## 2025-02-13 ENCOUNTER — NURSE ONLY (OUTPATIENT)
Dept: ELECTROPHYSIOLOGY | Facility: HOSPITAL | Age: 55
End: 2025-02-13
Attending: Other
Payer: COMMERCIAL

## 2025-02-13 ENCOUNTER — PROCEDURE VISIT (OUTPATIENT)
Dept: NEUROLOGY | Facility: CLINIC | Age: 55
End: 2025-02-13

## 2025-02-13 DIAGNOSIS — G44.099 OTHER TRIGEMINAL AUTONOMIC CEPHALGIA (TAC), NOT INTRACTABLE: ICD-10-CM

## 2025-02-13 DIAGNOSIS — G40.909 SEIZURE DISORDER (HCC): Primary | ICD-10-CM

## 2025-02-13 DIAGNOSIS — G40.909 SEIZURE DISORDER (HCC): ICD-10-CM

## 2025-02-13 PROCEDURE — 95819 EEG AWAKE AND ASLEEP: CPT

## 2025-02-13 NOTE — PROCEDURES
ELECTROENCEPHALOGRAM REPORT      Patient Name: Elham Sotelo  Chart ID: [id number]  Ordering Physician: Malachi Brenner MD          Date of Test: 2/13/2025  Patient Diagnosis:   Encounter Diagnosis   Name Primary?    Seizure disorder (HCC) Yes     A 54 YEAR OLD FEMALE PRESENTS FOR EVALUATION OF SEIZURE LIKE ACTIVITY. PATIENT HAD 2 EVENTS. ONE IN DEC 2024 AND ANOTHER IN JAN 2025. PATIENT DESCRIBED EVENTS AS TONIC CLONIC. SHE STATES SHE LOST AWARENESS, COULDNT SPEAK AND STOPPED BREATHING.   PAST MEDICAL HISTORY: ADRENAL ADENOMA, ASTHMA, ENDOCRINE DISORDER, FIBROMYALGIA, MIGRAINES, MYASTHENIA GRAVES, SEIZURE DISORDER, TRAUMATIC SUBDURAL HEMATOMA, SAH, ICH    MEDICATIONS: METFORMIN, RISEDRONATE, KEPPRA, PYRIDOSTIGMINE, TOPIRAMATE, INDOMETHACIN, PREDNISONE, SUMATRIPTAN        TECHNICAL SUMMARY  1. 10-20 International System.  2.  Bipolar and monopolar recording.  3.  Routine recording (awake and asleep).  4.  Portable - C.E.S.  5.  Impedance - 10 kilohms or less.    A routine EEG was obtained.  No sedation was given.    BACKGROUND:   Awake:   During wakefulness a well developed, reactive, posterior dominant rhythm consisting of 8-9 hertz potentials of medium amplitude is seen symmetrically.  Low voltage beta activity is seen with a frontal predominance.      Sleep:   Stage I and II demonstrated.   Vertex waves, POSTS and sleep spindles present.      EPILEPTIFORM ABNORMALITIES:  There is no epileptiform activity seen in this recording      ACTIVATION PROCEDURES:   Not performed      IMPRESSION:   Normal awake and asleep EEG study.  There is no epileptiform abnormalities seen. This however, does not completely exclude the possibility of seizures.  An extended EEG would be recommended.      Clinical correlation is advised.      Thank you for allowing us to participate in your patient's care.      Dipak Martinez MD  [facility]

## 2025-03-04 ENCOUNTER — TELEPHONE (OUTPATIENT)
Dept: NEUROLOGY | Facility: CLINIC | Age: 55
End: 2025-03-04

## 2025-03-04 NOTE — TELEPHONE ENCOUNTER
Spoke to  who states wife fainted today. Had warning and called for  who helped her to bed where she fainted and lost consciousness for \"a few minutes\".  Advised  to take patient for evaluation in the ER.  Verbalized understanding and intent to go to ER.

## 2025-03-04 NOTE — TELEPHONE ENCOUNTER
Patient called. She fainted and would like to speak to nurse. Please return the call at [phone]

## 2025-03-25 ENCOUNTER — HOSPITAL ENCOUNTER (INPATIENT)
Facility: HOSPITAL | Age: 55
LOS: 4 days | Discharge: HOME | End: 2025-03-29
Attending: EMERGENCY MEDICINE | Admitting: INTERNAL MEDICINE
Payer: COMMERCIAL

## 2025-03-25 ENCOUNTER — TELEPHONE (OUTPATIENT)
Dept: NEUROLOGY | Facility: CLINIC | Age: 55
End: 2025-03-25

## 2025-03-25 ENCOUNTER — APPOINTMENT (OUTPATIENT)
Dept: GENERAL RADIOLOGY | Facility: HOSPITAL | Age: 55
End: 2025-03-25
Payer: COMMERCIAL

## 2025-03-25 DIAGNOSIS — G70.01 MYASTHENIA GRAVIS IN CRISIS (HCC): Primary | ICD-10-CM

## 2025-03-25 PROBLEM — G70.00 MG (MYASTHENIA GRAVIS) (HCC): Status: ACTIVE | Noted: 2025-03-25

## 2025-03-25 PROBLEM — K21.9 GASTROESOPHAGEAL REFLUX DISEASE: Status: ACTIVE | Noted: 2025-03-25

## 2025-03-25 LAB
ALBUMIN SERPL-MCNC: 4.5 G/DL (ref 3.2–4.8)
ALBUMIN/GLOB SERPL: 1.9 {RATIO} (ref 1–2)
ALP LIVER SERPL-CCNC: 84 U/L
ALT SERPL-CCNC: 9 U/L
ANION GAP SERPL CALC-SCNC: 8 MMOL/L (ref 0–18)
AST SERPL-CCNC: 15 U/L (ref ?–34)
BASOPHILS # BLD AUTO: 0.04 X10(3) UL (ref 0–0.2)
BASOPHILS NFR BLD AUTO: 0.7 %
BILIRUB SERPL-MCNC: 0.3 MG/DL (ref 0.3–1.2)
BILIRUB UR QL STRIP.AUTO: NEGATIVE
BUN BLD-MCNC: 8 MG/DL (ref 9–23)
CALCIUM BLD-MCNC: 9.6 MG/DL (ref 8.7–10.6)
CHLORIDE SERPL-SCNC: 106 MMOL/L (ref 98–112)
CLARITY UR REFRACT.AUTO: CLEAR
CO2 SERPL-SCNC: 27 MMOL/L (ref 21–32)
COLOR UR AUTO: COLORLESS
CORTIS SERPL-MCNC: 3.9 UG/DL
CREAT BLD-MCNC: 0.75 MG/DL
EGFRCR SERPLBLD CKD-EPI 2021: 94 ML/MIN/1.73M2 (ref 60–?)
EOSINOPHIL # BLD AUTO: 0.14 X10(3) UL (ref 0–0.7)
EOSINOPHIL NFR BLD AUTO: 2.5 %
ERYTHROCYTE [DISTWIDTH] IN BLOOD BY AUTOMATED COUNT: 12.4 %
GLOBULIN PLAS-MCNC: 2.4 G/DL (ref 2–3.5)
GLUCOSE BLD-MCNC: 103 MG/DL (ref 70–99)
GLUCOSE UR STRIP.AUTO-MCNC: NORMAL MG/DL
HCT VFR BLD AUTO: 40.3 %
HGB BLD-MCNC: 13.8 G/DL
IMM GRANULOCYTES # BLD AUTO: 0.01 X10(3) UL (ref 0–1)
IMM GRANULOCYTES NFR BLD: 0.2 %
KETONES UR STRIP.AUTO-MCNC: NEGATIVE MG/DL
LEUKOCYTE ESTERASE UR QL STRIP.AUTO: NEGATIVE
LYMPHOCYTES # BLD AUTO: 2.56 X10(3) UL (ref 1–4)
LYMPHOCYTES NFR BLD AUTO: 46 %
MCH RBC QN AUTO: 29.6 PG (ref 26–34)
MCHC RBC AUTO-ENTMCNC: 34.2 G/DL (ref 31–37)
MCV RBC AUTO: 86.3 FL
MONOCYTES # BLD AUTO: 0.3 X10(3) UL (ref 0.1–1)
MONOCYTES NFR BLD AUTO: 5.4 %
MRSA DNA SPEC QL NAA+PROBE: NEGATIVE
NEUTROPHILS # BLD AUTO: 2.52 X10 (3) UL (ref 1.5–7.7)
NEUTROPHILS # BLD AUTO: 2.52 X10(3) UL (ref 1.5–7.7)
NEUTROPHILS NFR BLD AUTO: 45.2 %
NITRITE UR QL STRIP.AUTO: NEGATIVE
OSMOLALITY SERPL CALC.SUM OF ELEC: 291 MOSM/KG (ref 275–295)
PH UR STRIP.AUTO: 7 [PH] (ref 5–8)
PLATELET # BLD AUTO: 253 10(3)UL (ref 150–450)
POTASSIUM SERPL-SCNC: 3.4 MMOL/L (ref 3.5–5.1)
PROT SERPL-MCNC: 6.9 G/DL (ref 5.7–8.2)
PROT UR STRIP.AUTO-MCNC: NEGATIVE MG/DL
RBC # BLD AUTO: 4.67 X10(6)UL
RBC UR QL AUTO: NEGATIVE
SODIUM SERPL-SCNC: 141 MMOL/L (ref 136–145)
SP GR UR STRIP.AUTO: <1.005 (ref 1–1.03)
TROPONIN I SERPL HS-MCNC: <3 NG/L
UROBILINOGEN UR STRIP.AUTO-MCNC: NORMAL MG/DL
WBC # BLD AUTO: 5.6 X10(3) UL (ref 4–11)

## 2025-03-25 PROCEDURE — 99223 1ST HOSP IP/OBS HIGH 75: CPT | Performed by: INTERNAL MEDICINE

## 2025-03-25 PROCEDURE — 30233S1 TRANSFUSION OF NONAUTOLOGOUS GLOBULIN INTO PERIPHERAL VEIN, PERCUTANEOUS APPROACH: ICD-10-PCS | Performed by: EMERGENCY MEDICINE

## 2025-03-25 PROCEDURE — 71045 X-RAY EXAM CHEST 1 VIEW: CPT | Performed by: EMERGENCY MEDICINE

## 2025-03-25 PROCEDURE — 99291 CRITICAL CARE FIRST HOUR: CPT | Performed by: INTERNAL MEDICINE

## 2025-03-25 RX ORDER — ACETAMINOPHEN 500 MG
500 TABLET ORAL EVERY 4 HOURS PRN
Status: DISCONTINUED | OUTPATIENT
Start: 2025-03-25 | End: 2025-03-29

## 2025-03-25 RX ORDER — PANTOPRAZOLE SODIUM 40 MG/1
40 TABLET, DELAYED RELEASE ORAL
Status: DISCONTINUED | OUTPATIENT
Start: 2025-03-26 | End: 2025-03-27

## 2025-03-25 RX ORDER — PYRIDOSTIGMINE BROMIDE 60 MG/1
60 TABLET ORAL
COMMUNITY
End: 2025-03-29

## 2025-03-25 RX ORDER — LEVETIRACETAM 500 MG/1
1000 TABLET ORAL 2 TIMES DAILY
Status: DISCONTINUED | OUTPATIENT
Start: 2025-03-25 | End: 2025-03-26

## 2025-03-25 RX ORDER — PYRIDOSTIGMINE BROMIDE 60 MG/1
60 TABLET ORAL EVERY 6 HOURS SCHEDULED
Status: DISCONTINUED | OUTPATIENT
Start: 2025-03-26 | End: 2025-03-26

## 2025-03-25 RX ORDER — ACETAMINOPHEN 325 MG/1
650 TABLET ORAL SEE ADMIN INSTRUCTIONS
Status: DISCONTINUED | OUTPATIENT
Start: 2025-03-25 | End: 2025-03-29

## 2025-03-25 RX ORDER — SODIUM CHLORIDE 9 MG/ML
INJECTION, SOLUTION INTRAVENOUS CONTINUOUS
Status: DISCONTINUED | OUTPATIENT
Start: 2025-03-25 | End: 2025-03-26

## 2025-03-25 RX ORDER — ONDANSETRON 2 MG/ML
4 INJECTION INTRAMUSCULAR; INTRAVENOUS EVERY 6 HOURS PRN
Status: DISCONTINUED | OUTPATIENT
Start: 2025-03-25 | End: 2025-03-29

## 2025-03-25 RX ORDER — DIPHENHYDRAMINE HCL 25 MG
50 CAPSULE ORAL SEE ADMIN INSTRUCTIONS
Status: DISCONTINUED | OUTPATIENT
Start: 2025-03-25 | End: 2025-03-29

## 2025-03-25 RX ORDER — ENOXAPARIN SODIUM 100 MG/ML
40 INJECTION SUBCUTANEOUS DAILY
Status: DISCONTINUED | OUTPATIENT
Start: 2025-03-26 | End: 2025-03-29

## 2025-03-25 RX ORDER — PREDNISONE 20 MG/1
TABLET ORAL
Qty: 30 TABLET | Refills: 0 | Status: SHIPPED | OUTPATIENT
Start: 2025-03-25 | End: 2025-03-25 | Stop reason: CLARIF

## 2025-03-25 NOTE — ED INITIAL ASSESSMENT (HPI)
Patient with tingling in bilateral legs. Speech problem since yesterday, slurring words since yesterday morning. Worse as the day went on. Headache the day before, feels tired. Feels like mouth is full when talking. Not eating. Has myasthenia gravis. Patient also mentions chest pain

## 2025-03-25 NOTE — ED QUICK NOTES
Orders for admission, patient is aware of plan and ready to go upstairs. Any questions, please call ED RN Maxine at extension [phone].     Patient Covid vaccination status: Fully vaccinated     COVID Test Ordered in ED: None    COVID Suspicion at Admission: N/A    Running Infusions:      Mental Status/LOC at time of transport: x4    Other pertinent information:   CIWA score: N/A   NIH score:  N/A
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
## 2025-03-25 NOTE — TELEPHONE ENCOUNTER
Spoke to patient who reports that her condition is worsening and she's headed to ER.  She is informed that the ER is the best place for her if her symptoms are severe and worsening.  No further questions are voiced.

## 2025-03-25 NOTE — TELEPHONE ENCOUNTER
Patient saw PCP yesterday who directed her to discuss possible MG symptoms with Dr. Brenner.  Patient reports having increased slurred speech, especially when fatigued, and difficulty breathing.  She reports that when she tries to eat or walking, she feels as though she's catching her breath.  She reports losing weight and in the last 4 months she went from 198 lbs to 150 lbs.  She reports her bowel/bladder function are normal.    When asked to  front of a mirror, she is able to raise both arms equally, stick her tongue out without issues, smile without deficits and raise her eyebrows.  She reports her right eyebrow doesn't raise however it's been that way for years.  She reports feeling tired and fatigued all the time as well as cramps in legs and fingers.  She reports her PCP took labs yesterday.    Per Dr. Brenner, patient to start on Prednisone PO taper for 2 weeks.  Patient notified and instructed to call back if her symptoms persist after she completed the taper.  She verbalizes understanding and is agreeable to the plan.  No further questions are voiced.  Pharmacy verified.

## 2025-03-25 NOTE — PROGRESS NOTES
03/25/25 1747   Vent Information   $ RT Standby Charge (per 15 min) 1   Spontaneous Parameters   Negative Inspiratory Force -10   Total RSBI 1.51     Reported Values to Shireen CASTELLANO.

## 2025-03-25 NOTE — CONSULTS
CRITICAL CARE CONSULT NOTE:     Patient Name: Elham Sotelo  : 1970  MRN: [id number]  Admit Date: 3/25/2025  Length of Stay: 0 Days    HPI:  Elham Sotelo is a 54 year old female with a past medical history of seizure disorder on keppra, adrenal insufficiency, asthma, migraines, myasthenia gravis presents to the ER today as instructed by her neurologist due to ~2 day history of fatigue, weakness, BLE numbness and tingling, difficulty speaking and eating due to concern for myasthenia crisis. Labs, imaging, and EKG unremarkable.    Patient seen and examined in the ER. Vital signs stable. Spouse at bedside. She is speaking in few work sentences before catching her breath. R eye ptosis noted. Talking all her medications as prescribed. No fevers, chills, cough or other viral symptoms. Reports being diagnosed with myasthenia gravis in 2019 and has be in crisis one time since then requiring hospitalization and IVIG. She did not require intubation. Requested RT perform nif and vital capacity which were -10 and 1.5L. Neurology was consulted in the ER. IVIG was ordered. Will transfer to ICU for further care/close monitoring.     ROS as above    Past medical history as above    Allergies  Patient is allergic to pcns [penicillins].    Physical Exam  General: No acute distress  Neuro: AOx3, non focal   Lungs: Clear, mildly dyspneic   Cardio:  RRR  Abdomen: Soft, non tended, non distended   Extremities: Warm, no swelling    Hemodynamics  24h Vitals:  VitalLast Value (24 Hour)24 Hour Range  Temp No data recorded  [id number]  Min: 60  Max: 72  BP (Non-Invasive)108/76 BP  Min: 106/60  Max: 108/76  BP (A-Line) No data recorded  CVP  could not be evaluated. This SmartLink does not work with rows of the type:   [id number]  Min: 16  Max: 18  [id number] %SpO2  Min: 100 %  Max: 100 %  O2 Therapy       Assessment and Plan:  Myasthenic crisis  Hypokalemia  H/o Seizure disorder  H/o Migraine  H/o Adrenal  insufficiency/adenoma  H/o Asthma  H/o GERD  H/o Fibromyalgia    -Admit to ICU given high risk for impending respiratory failure  -Neurology consulted, IVIG ordered  -Serial Nif/VC (-10 and 1.5 L)   -NPO with sips/meds for now  -LR until able to take PO safely, electrolyte protocol   -Resume home meds once reconciled  -Hospitalist consult     ICU checklist  Feeding: NPO  Analgesia: PRN Tylenol  Sedation: NA  Thromboembolism PPX: LMWH  Stress Ulcer PPX: NA  Glucose control: <180  Bowel Regimen: PRN  Lines/drains/tubes: PIV  Deescalation of antibiotics: NA  Therapies: PT/OT/ST when appropriate    Code Status: Full Code    Discussed with critical care intensivist Dr. Leon Duong, [facility]  Critical Care Nurse Practitioner

## 2025-03-25 NOTE — TELEPHONE ENCOUNTER
Patient calling she stated she has speech problems and breathing problems and was told by the pcp to see the neurology asap.  Please ask Dr Brenner when she can see her
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
## 2025-03-26 ENCOUNTER — APPOINTMENT (OUTPATIENT)
Dept: CT IMAGING | Facility: HOSPITAL | Age: 55
End: 2025-03-26
Attending: Other
Payer: COMMERCIAL

## 2025-03-26 PROBLEM — E46 MALNUTRITION (HCC): Status: ACTIVE | Noted: 2025-03-26

## 2025-03-26 LAB
ALBUMIN SERPL-MCNC: 3.7 G/DL (ref 3.2–4.8)
ALBUMIN/GLOB SERPL: 1.4 {RATIO} (ref 1–2)
ALP LIVER SERPL-CCNC: 70 U/L
ALT SERPL-CCNC: 8 U/L
ANION GAP SERPL CALC-SCNC: 9 MMOL/L (ref 0–18)
AST SERPL-CCNC: 12 U/L (ref ?–34)
ATRIAL RATE: 68 BPM
BASOPHILS # BLD AUTO: 0.03 X10(3) UL (ref 0–0.2)
BASOPHILS NFR BLD AUTO: 0.7 %
BILIRUB SERPL-MCNC: 0.4 MG/DL (ref 0.3–1.2)
BUN BLD-MCNC: 8 MG/DL (ref 9–23)
CALCIUM BLD-MCNC: 8.9 MG/DL (ref 8.7–10.6)
CHLORIDE SERPL-SCNC: 113 MMOL/L (ref 98–112)
CO2 SERPL-SCNC: 22 MMOL/L (ref 21–32)
CREAT BLD-MCNC: 0.61 MG/DL
EGFRCR SERPLBLD CKD-EPI 2021: 106 ML/MIN/1.73M2 (ref 60–?)
EOSINOPHIL # BLD AUTO: 0.17 X10(3) UL (ref 0–0.7)
EOSINOPHIL NFR BLD AUTO: 3.8 %
ERYTHROCYTE [DISTWIDTH] IN BLOOD BY AUTOMATED COUNT: 12.7 %
GLOBULIN PLAS-MCNC: 2.7 G/DL (ref 2–3.5)
GLUCOSE BLD-MCNC: 90 MG/DL (ref 70–99)
HCT VFR BLD AUTO: 36.3 %
HGB BLD-MCNC: 12.2 G/DL
IMM GRANULOCYTES # BLD AUTO: 0.01 X10(3) UL (ref 0–1)
IMM GRANULOCYTES NFR BLD: 0.2 %
LYMPHOCYTES # BLD AUTO: 1.96 X10(3) UL (ref 1–4)
LYMPHOCYTES NFR BLD AUTO: 43.7 %
MAGNESIUM SERPL-MCNC: 2 MG/DL (ref 1.6–2.6)
MCH RBC QN AUTO: 29.2 PG (ref 26–34)
MCHC RBC AUTO-ENTMCNC: 33.6 G/DL (ref 31–37)
MCV RBC AUTO: 86.8 FL
MONOCYTES # BLD AUTO: 0.36 X10(3) UL (ref 0.1–1)
MONOCYTES NFR BLD AUTO: 8 %
NEUTROPHILS # BLD AUTO: 1.96 X10 (3) UL (ref 1.5–7.7)
NEUTROPHILS # BLD AUTO: 1.96 X10(3) UL (ref 1.5–7.7)
NEUTROPHILS NFR BLD AUTO: 43.6 %
OSMOLALITY SERPL CALC.SUM OF ELEC: 296 MOSM/KG (ref 275–295)
P AXIS: 45 DEGREES
P-R INTERVAL: 162 MS
PHOSPHATE SERPL-MCNC: 4 MG/DL (ref 2.4–5.1)
PLATELET # BLD AUTO: 220 10(3)UL (ref 150–450)
POTASSIUM SERPL-SCNC: 3.8 MMOL/L (ref 3.5–5.1)
POTASSIUM SERPL-SCNC: 3.8 MMOL/L (ref 3.5–5.1)
PROT SERPL-MCNC: 6.4 G/DL (ref 5.7–8.2)
Q-T INTERVAL: 396 MS
QRS DURATION: 90 MS
QTC CALCULATION (BEZET): 421 MS
R AXIS: 45 DEGREES
RBC # BLD AUTO: 4.18 X10(6)UL
SODIUM SERPL-SCNC: 144 MMOL/L (ref 136–145)
T AXIS: 35 DEGREES
TSI SER-ACNC: 1.1 UIU/ML (ref 0.55–4.78)
VENTRICULAR RATE: 68 BPM
WBC # BLD AUTO: 4.5 X10(3) UL (ref 4–11)

## 2025-03-26 PROCEDURE — 99223 1ST HOSP IP/OBS HIGH 75: CPT | Performed by: OTHER

## 2025-03-26 PROCEDURE — 71270 CT THORAX DX C-/C+: CPT | Performed by: OTHER

## 2025-03-26 PROCEDURE — 99232 SBSQ HOSP IP/OBS MODERATE 35: CPT | Performed by: HOSPITALIST

## 2025-03-26 PROCEDURE — 99232 SBSQ HOSP IP/OBS MODERATE 35: CPT | Performed by: INTERNAL MEDICINE

## 2025-03-26 RX ORDER — MELATONIN
100 DAILY
Status: DISCONTINUED | OUTPATIENT
Start: 2025-03-26 | End: 2025-03-29

## 2025-03-26 RX ORDER — PYRIDOSTIGMINE BROMIDE 60 MG/1
90 TABLET ORAL 3 TIMES DAILY
Status: DISCONTINUED | OUTPATIENT
Start: 2025-03-26 | End: 2025-03-29

## 2025-03-26 RX ORDER — DIPHENHYDRAMINE HCL 25 MG
50 CAPSULE ORAL EVERY 24 HOURS
Status: COMPLETED | OUTPATIENT
Start: 2025-03-26 | End: 2025-03-29

## 2025-03-26 RX ORDER — DOXEPIN HYDROCHLORIDE 50 MG/1
1 CAPSULE ORAL DAILY
Status: DISCONTINUED | OUTPATIENT
Start: 2025-03-27 | End: 2025-03-29

## 2025-03-26 RX ORDER — HYDROCORTISONE SODIUM SUCCINATE 100 MG/2ML
50 INJECTION INTRAMUSCULAR; INTRAVENOUS EVERY 6 HOURS
Status: DISCONTINUED | OUTPATIENT
Start: 2025-03-26 | End: 2025-03-28

## 2025-03-26 RX ORDER — POTASSIUM CHLORIDE 1.5 G/1.58G
40 POWDER, FOR SOLUTION ORAL ONCE
Status: COMPLETED | OUTPATIENT
Start: 2025-03-26 | End: 2025-03-26

## 2025-03-26 RX ORDER — ACETAMINOPHEN 325 MG/1
650 TABLET ORAL EVERY 24 HOURS
Status: COMPLETED | OUTPATIENT
Start: 2025-03-26 | End: 2025-03-29

## 2025-03-26 RX ORDER — LEVETIRACETAM 500 MG/1
1000 TABLET ORAL 2 TIMES DAILY
Status: DISCONTINUED | OUTPATIENT
Start: 2025-03-26 | End: 2025-03-29

## 2025-03-26 RX ORDER — DEXTROSE, SODIUM CHLORIDE, SODIUM LACTATE, POTASSIUM CHLORIDE, AND CALCIUM CHLORIDE 5; .6; .31; .03; .02 G/100ML; G/100ML; G/100ML; G/100ML; G/100ML
INJECTION, SOLUTION INTRAVENOUS CONTINUOUS
Status: DISCONTINUED | OUTPATIENT
Start: 2025-03-26 | End: 2025-03-27

## 2025-03-26 RX ORDER — PYRIDOSTIGMINE BROMIDE 60 MG/1
60 TABLET ORAL
Status: DISCONTINUED | OUTPATIENT
Start: 2025-03-26 | End: 2025-03-26

## 2025-03-26 RX ORDER — TOPIRAMATE 25 MG/1
100 TABLET, FILM COATED ORAL 2 TIMES DAILY
Status: DISCONTINUED | OUTPATIENT
Start: 2025-03-26 | End: 2025-03-29

## 2025-03-26 NOTE — PAYOR COMM NOTE
--------------  ADMISSION REVIEW     Payor: [employer]  Subscriber #:  [id number]  Authorization Number: N/A    Admit date: 3/25/25  Admit time:  6:45 PM       REVIEW DOCUMENTATION:     ED Provider Notes          Stated Complaint:     Subjective:   HPI      Patient comes to the emergency department with generalized weakness, difficulty breathing and difficulty swallowing along with slurred speech over the past 2 days.  The symptoms have been worsening throughout the day today.  The patient has previous history of myasthenia gravis.  She was diagnosed in 2019 and has had since then been admitted once for IVIG treatment for her myasthenia.          Physical Exam     ED Triage Vitals   BP 03/25/25 1606 106/60   Pulse 03/25/25 1606 72   Resp 03/25/25 1606 18   Temp 03/25/25 1900 98.2 °F (36.8 °C)   Temp src 03/25/25 1900 Temporal   SpO2 03/25/25 1606 100 %   O2 Device 03/25/25 1606 None (Room air)       Current Vitals:   Vital Signs  BP: 107/63  Pulse: 63  Resp: 24  Temp: 98.6 °F (37 °C)  Temp src: Temporal  MAP (mmHg): 74    Oxygen Therapy  SpO2: 99 %  O2 Device: None (Room air)  Pulse Oximetry Type: Continuous  Oximetry Probe Site Changed: No  Pulse Ox Probe Location: Left hand        Physical Exam  Vitals and nursing note reviewed.   Constitutional:       Appearance: She is well-developed.   HENT:      Head: Normocephalic.   Cardiovascular:      Rate and Rhythm: Normal rate and regular rhythm.      Heart sounds: Normal heart sounds. No murmur heard.  Pulmonary:      Effort: Pulmonary effort is normal. No respiratory distress.      Breath sounds: Normal breath sounds.   Abdominal:      General: Bowel sounds are normal.      Palpations: Abdomen is soft.      Tenderness: There is no abdominal tenderness. There is no rebound.   Musculoskeletal:         General: No tenderness. Normal range of motion.      Cervical back: Normal range of motion and neck supple.   Lymphadenopathy:      Cervical: No cervical  adenopathy.   Skin:     General: Skin is warm and dry.      Findings: No rash.   Neurological:      Mental Status: She is alert and oriented to person, place, and time.      Sensory: No sensory deficit.      Comments: Patient's speech is somewhat dysarthric.  No focal or lateralizing numbness or weakness is noted.  Patient is noted to have some generalized mild weakness.            ED Course     Labs Reviewed   COMP METABOLIC PANEL (14) - Abnormal; Notable for the following components:       Result Value    Glucose 103 (*)     Potassium 3.4 (*)     BUN 8 (*)     ALT 9 (*)     All other components within normal limits   URINALYSIS, ROUTINE - Abnormal; Notable for the following components:    Urine Color Colorless (*)     Spec Gravity <1.005 (*)     All other components within normal limits   TROPONIN I HIGH SENSITIVITY - Normal   ED/MRSA SCREEN BY PCR-CC - Normal   CBC WITH DIFFERENTIAL WITH PLATELET   CORTISOL   RAINBOW DRAW LAVENDER   RAINBOW DRAW LIGHT GREEN   RAINBOW DRAW BLUE     EKG    Rate, intervals and axes as noted on EKG Report.  Rate: 68  Rhythm: Sinus Rhythm  Reading: Normal           ED Course as of 03/25/25 2229  ------------------------------------------------------------  Time: 03/25 1700  Value: XR CHEST AP PORTABLE  (CPT=71045)  Comment: Images were independently viewed by me and no infiltrate noted.  Mediastinal and cardiac silhouettes were normal.    ------------------------------------------------------------  Time: 03/25 1716  Value: CBC With Differential With Platelet  Comment: Unremarkable    ------------------------------------------------------------  Time: 03/25 1725  Value: Comp Metabolic Panel (14)(!)  Comment: Unremarkable    ------------------------------------------------------------  Time: 03/25 1809  Value: Urinalysis, Routine(!)  Comment: Unremarkable    ------------------------------------------------------------  Time: 03/25 1812  Comment: Patient's NIF was suboptimal at 10.              MDM      Patient comes to the emergency department with previous history of myasthenia gravis with increasing difficulty breathing, generalized weakness, difficulty swallowing and dysarthria.  Differential diagnosis includes significant myasthenia gravis exacerbation.  Patient was evaluated in the emergency department by critical care and NIF was assessed.  It was noted to be quite low and because of this, the patient was admitted to the intensive care unit.  While patient was in the emergency department I also consulted with neurology and IVIG protocols were initiated.  Patient had no further acute deterioration while in the emergency department.  No hypoxia was noted.    Admission disposition: 3/25/2025  6:12 PM           Medical Decision Making      Disposition and Plan     Clinical Impression:  1. Myasthenia gravis in crisis (HCC)         Disposition:  Admit  3/25/2025  6:12 pm       [facility]                                                               History & Physical                  Elham Sotelo Patient Status:  Emergency    1970 MRN [id number]   Location [facility] EMERGENCY DEPARTMENT Attending Andi Thompson MD   Hosp Day # 0 PCP Phillip Hawthorne MD      Chief Complaint: Shortness of breath, generalized weakness, difficulty speaking and difficulty swallowing, myasthenia gravis exacerbation     History of Present Illness:  Elham Sotelo is a 54 year old female admitted with Shortness of breath, generalized weakness, difficulty speaking and difficulty swallowing, myasthenia gravis exacerbation.  Current symptoms started 2 days back and progressively worsening according to patient.  Seen with patient's  at bedside who is also helping with history.  Complains of tingling in bilateral legs.  Speech problems since yesterday with slurring words since yesterday morning according to patient.  Worse as the day went on according to patient.  Patient states she had  mild headache the day before.  Feels extremely tired.  Patient states she feels her mouth is full when talking.  Has shortness of breath and generalized weakness which is worse with minimal exertion and with deep breathing or conversation.  Denies any associated fever or chills.  Denies any cold or cough symptoms.  Denies any recent change of medications.  Denies any recent long trips.  Denies any nausea vomiting or diarrhea.  Patient and  states patient had similar episode of myasthenia gravis exacerbation before which improved with IVIG treatment, patient states she called her regular outpatient primary Dr. Hawthorne and Neurologist Dr. Brenner and was directed to ER.  Patient seen and admitted while still in ER        ASSESSMENT / PLAN:      #Myasthenia gravis exacerbation with generalized weakness, dyspnea with minimal exertion and even conversation, difficulty swallowing, slurred speech, tingling in bilateral legs, dysarthria  Neurology consult, plans on IVIG per neurology, first dose being given in ER  NIF testing done in ER showed NIF of -10 and patient lethargic and markedly dyspneic after.  Will need to watch in ICU as patient has severe potential for deterioration.  Follow periodic NIF testing per pulmonary and neurology directions  Speech and swallow eval  PT OT eval  Aspiration precautions  Fall precautions  Will plan to continue home pyridostigmine once tolerates oral diet, await speech and swallow eval  #History of adrenal insufficiency, history of adrenal adenoma  Check cortisol level  # Mild persistent asthma  Continue home inhalers  Pulmonary consult  #GERD  PPI  #Seizure disorder  Patient takes Keppra at home  #Fibromyalgia     Quality:  DVT Prophylaxis: SCD, subcutaneous Lovenox  CODE status:   Code Status: Full Code  Castillo: No urinary catheter in place        Plan of care discussed with patient, patient's  at bedside.        Discussed with ER Physician.  Discussed with ICU APN and  intensivist Dr. Quintero  Patient has a severe potential for deterioration and will monitor in critical care unit  35 minutes of critical care time spent on admission.  Upon my evaluation, this patient had a high probability of imminent or life-threatening deterioration due to central nervous system failure, which required my direct attention, intervention, and personal management.     I have personally provided 35 minutes of critical care time, exclusive of time spent on separately billable procedures.  Time includes review of all pertinent laboratory/radiology results, discussion with consultants, and monitoring for potential decompensation.  Performed interventions included  close monitoring for myasthenia gravis exacerbation with dysarthria, dyspnea with deep breathing and conversation, would need close monitoring including periodic NIF testing as has severe potential for deterioration with high potential for worsening or impending respiratory failure with myasthenia .     My colleague will follow from morning tomorrow        Ceci Savage MD           CRITICAL CARE CONSULT NOTE:      Patient Name: Elham Sotelo  : 1970  MRN: [id number]  Admit Date: 3/25/2025  Length of Stay: 0 Days     HPI:  Elham Sotelo is a 54 year old female with a past medical history of seizure disorder on keppra, adrenal insufficiency, asthma, migraines, myasthenia gravis presents to the ER today as instructed by her neurologist due to ~2 day history of fatigue, weakness, BLE numbness and tingling, difficulty speaking and eating due to concern for myasthenia crisis. Labs, imaging, and EKG unremarkable.     Patient seen and examined in the ER. Vital signs stable. Spouse at bedside. She is speaking in few work sentences before catching her breath. R eye ptosis noted. Talking all her medications as prescribed. No fevers, chills, cough or other viral symptoms. Reports being diagnosed with myasthenia gravis in 2019 and has be  in crisis one time since then requiring hospitalization and IVIG. She did not require intubation. Requested RT perform nif and vital capacity which were -10 and 1.5L. Neurology was consulted in the ER. IVIG was ordered. Will transfer to ICU for further care/close monitoring.        Assessment and Plan:  Myasthenic crisis  Hypokalemia  H/o Seizure disorder  H/o Migraine  H/o Adrenal insufficiency/adenoma  H/o Asthma  H/o GERD  H/o Fibromyalgia     -Admit to ICU given high risk for impending respiratory failure  -Neurology consulted, IVIG ordered  -Serial Nif/VC (-10 and 1.5 L)   -NPO with sips/meds for now  -LR until able to take PO safely, electrolyte protocol   -Resume home meds once reconciled  -Hospitalist consult      ICU checklist  Feeding: NPO  Analgesia: PRN Tylenol  Sedation: NA  Thromboembolism PPX: LMWH  Stress Ulcer PPX: NA  Glucose control: <180  Bowel Regimen: PRN  Lines/drains/tubes: PIV  Deescalation of antibiotics: NA  Therapies: PT/OT/ST when appropriate     Code Status: Full Code     Discussed with critical care intensivist Dr. Leon Duong, [facility]  Critical Care Nurse Practitioner            3/26 CRITICAL CARE PROGRESS NOTE     Patient Name: Elham Sotelo  : 1970  MRN: [id number]  Admit Date: 3/25/2025  Length of Stay: 1 Days     Subjective/24h events: No events overnight. Still complaint of fatigue, mildly short of breath, and unable to eat.      Physical Exam  General: No acute distress  Neuro: AO x3, non focal   Lungs: Clear  Cardio:  RRR  Abdomen: Soft, non tender, non distended  Extremities: Warm, no swelling      Hemodynamics  24h Vitals:  VitalLast Value (24 Hour)24 Hour Range  Temp99 °F (37.2 °C)Temp  Min: 98 °F (36.7 °C)  Max: 99 °F (37.2 °C)  [id number]  Min: 57  Max: 83  BP (Non-Invasive)95/61 BP  Min: 87/46  Max: 118/69  BP (A-Line) No data recorded  CVP  could not be evaluated. This SmartLink does not work with rows of the type:   [id number]  Min: 14  Max:  26  [id number] %SpO2  Min: 96 %  Max: 100 %  O2 Therapy          Assessment and Plan:  Myasthenic crisis  Hypokalemia  H/o Seizure disorder  H/o Migraine  H/o Adrenal insufficiency/adenoma  H/o Asthma  H/o GERD  H/o Fibromyalgia     Neuro: IVIG #2/5 this morning. Home pyridostigmine, keppra, topiramate resumed. Neurology following, appreciate input.      CV: Hemodynamically stable. Non tachycardic. Low normal BP overnight with systolic 80-90. Cortisol came back at 3. Will trial IV steroids given history- currently not on any steroids outpatient.      Pulm: On room air. Nif/VC marginally improved -18/. CT chest per neuro to eval for thymoma. Hx of asthma? On no meds outpatient.      GI: Diet as tolerated, PPI     Renal: No acute issues. Voiding freely. Electrolyte protocol. D5LR 75cc/hr until taking adequate PO.     ID: No acute issues      Heme: No acute issues     Endo: Trial of steroids per above     ICU checklist  Feeding: Regular  Analgesia: PRN Tylenol  Sedation: NA  Thromboembolism PPX: LMWH  Stress Ulcer PPX: PPI  Glucose control: <180  Bowel Regimen: PRN  Lines/drains/tubes: PIV  Deescalation of antibiotics: NA  Therapies: PT/OT/ST consulted      Code Status: Full Code     Discussed with critical care intensivist Dr. Leon Dunog, [facility]  Critical Care Nurse Practitioner            MEDICATIONS ADMINISTERED IN LAST 1 DAY:  acetaminophen (Tylenol Extra Strength) tab 500 mg       Date Action Dose Route User    3/25/2025 2114 Given 500 mg Oral Jessica Abel, MAYELA          immune globulin (Gammagard) 10% infusion 25 g       Date Action Dose Route User    3/25/2025 2035 Rate/Dose Change (none) Intravenous Jessica Abel RN    3/25/2025 2005 Rate/Dose Change (none) Intravenous Jessica Abel RN    3/25/2025 1935 Rate/Dose Change (none) Intravenous Jessica Aebl RN    3/25/2025 1900 Rate/Dose Change (none) Intravenous Maxine Rodriguez RN    3/25/2025 1800 New Bag 25 g Intravenous Maxine Rodriguez RN           levETIRAcetam (Keppra) tab 1,000 mg       Date Action Dose Route User    3/25/2025 2209 Given 1,000 mg Oral Jessica Abel RN          pantoprazole (Protonix) DR tab 40 mg       Date Action Dose Route User    3/26/2025 0537 Given 40 mg Oral Jessica Abel RN          potassium chloride (Klor-Con) 20 MEQ oral powder 40 mEq       Date Action Dose Route User    3/26/2025 0537 Given 40 mEq Oral Jessica Abel RN          potassium chloride 40 mEq in 250mL sodium chloride 0.9% IVPB premix       Date Action Dose Route User    3/25/2025 2042 New Bag 40 mEq Intravenous Jessica Abel RN          pyridostigmine (Mestinon) tab 60 mg       Date Action Dose Route User    3/25/2025 2341 Given 60 mg Oral Jessica Abel RN          sodium chloride 0.9% infusion       Date Action Dose Route User    3/25/2025 2041 New Bag (none) Intravenous Jessica Abel RN            Vitals (last day)       Date/Time Temp Pulse Resp BP SpO2 Weight O2 Device O2 Flow Rate (L/min) [facility]    03/26/25 0500 -- 83 23 101/55 100 % 150 lb 5.7 oz (68.2 kg) -- --     03/26/25 0430 -- 65 19 103/61 100 % -- -- -- ZS    03/26/25 0400 99 °F (37.2 °C) 61 15 87/46 98 % -- None (Room air) -- ZS    03/26/25 0300 -- 62 16 93/57 98 % -- -- -- ZS    03/26/25 0200 -- 60 18 92/56 99 % -- -- -- ZS    03/26/25 0100 -- 64 18 95/58 96 % -- -- -- ZS    03/26/25 0000 98 °F (36.7 °C) 68 23 105/63 97 % -- None (Room air) -- ZS    03/25/25 2300 -- 66 20 99/58 97 % -- -- -- ZS    03/25/25 2200 -- 76 26 101/56 99 % -- -- -- ZS    03/25/25 2100 -- 63 24 107/63 99 % -- -- -- ZS    03/25/25 2000 98.6 °F (37 °C) 66 20 107/91 100 % -- None (Room air) -- ZS    03/25/25 1901 -- -- -- -- -- 149 lb 14.6 oz (68 kg) -- -- BK    03/25/25 1900 98.2 °F (36.8 °C) 63 14 118/69 100 % -- None (Room air) -- BK    03/25/25 1800 -- 60 22 103/65 100 % -- None (Room air) -- AW    03/25/25 1656 -- -- -- -- -- -- None (Room air) -- AW    03/25/25 1630 -- 60 16 108/76 100 % -- None (Room  air) -- AW    03/25/25 1606 -- 72 18 106/60 100 % 150 lb (68 kg) None (Room air) -- JR

## 2025-03-26 NOTE — DIETARY NOTE
[facility]   part of [facility]    NUTRITION ASSESSMENT    Pt meets severe malnutrition criteria at this time.    CRITERIA FOR MALNUTRITION DIAGNOSIS:  Criteria for severe malnutrition diagnosis: acute illness/injury related to wt loss greater than 7.5% in 3 months and energy intake less than 50% for greater than 5 days      NUTRITION INTERVENTION:    RD nutrition Care Plan- See RD nutrition assessment for additional recommendations  Meal and Snacks - Monitor and encourage adequate PO intake.   Medical Food Supplements - Strawberry and Vanilla Ensure Plus High Protein Daily. Rationale/use for oral supplements discussed.  Vitamin and Mineral Supplements - adding Multivitamin with minerals and Thiamine      PATIENT STATUS: 03/26/25 Pt is a 55 y/o F admitted for tingling legs, SOB, lack of eating, headache, and slurred speech. Reviewing pt's chart d/t +MST w/ eval consult. Pt was lethargic at time of visit but able to answer all questions. She says that she does have an appetite and feels hungry, but d/t swallowing and breathing difficulties, she tends to get tired and stops eating before she is done w the meal. Because of this she reports losing 45 lbs over the span of 3.5 months. Pt says that she is happy and knows she will be okay but is just very tired. Very solid foods tend to bother her and so do thicker liquids. No choking or aspiration but just uncomfortable. Pt is vegetarian and avoids chocolate d/t it giving her headaches. No BM since PTA - pt arrived yesterday. Agreeable to try either vanilla or strawberry Ensure to get nutrients more easily. I told her both could be sent for her to determine which she prefers. Will continue to follow closely and monitor PO + ONS intake. All questions answered at this time.       ANTHROPOMETRICS:  Ht: 167.6 cm (5' 6\")  Wt: 68.2 kg (150 lb 5.7 oz).   BMI: Body mass index is 24.27 kg/m².  IBW: 59.09 kg 130      WEIGHT HISTORY:   Weight loss: Yes, Severe Wt loss of 45  lbs, 23%, over 3.5 months     Wt Readings from Last 10 Encounters:   03/26/25 68.2 kg (150 lb 5.7 oz)   04/20/23 74.8 kg (165 lb)   04/01/22 79.4 kg (175 lb)   02/14/22 79.4 kg (175 lb)   09/23/21 79.4 kg (175 lb)   06/03/21 77.1 kg (170 lb)   04/26/21 77.1 kg (170 lb)   03/31/21 77.1 kg (170 lb)   03/12/21 77.1 kg (170 lb)   02/22/21 77.1 kg (170 lb)        NUTRITION:  Diet:       Procedures    Regular/General diet Is Patient on Accuchecks? No      Food Allergies:  intolerance to chocolate  Cultural/Ethnic/Religion Preferences Addressed: Yes    Percent Meals Eaten (last 3 days)       None            GI system review: swallowing dysfunction    Skin and wounds: WNL    NUTRITION RELATED PHYSICAL FINDINGS:     1. Body Fat/Muscle Mass: mild depletion body fat Orbital fat pad and mild muscle depletion Temple region     2. Fluid Accumulation: none    NUTRITION PRESCRIPTION:  68.2 kg 150 lbs Actual Body Weight  Calories: 0503-3359 calories/day (30-40 kcal/kg)  Protein: 102-136 grams protein/day (1.5-2.0 grams protein per kg)  Fluid: ~1 ml/kcal or per MD discretion    NUTRITION DIAGNOSIS/PROBLEM:  Malnutrition related to decreased intake and inability to take or tolerate as evidenced by documented/reported insufficient oral intake and documented/reported unintentional weight loss      MONITOR AND EVALUATE/NUTRITION GOALS:  PO intake of 75% of meals TID - New  PO intake of 75% of oral nutrition supplement/s - New      MEDICATIONS:  Hydrocortisone, Protonix, KCl 40 mEq    GTT: dextrose 75 ml/hr    LABS:  Cl 113, BUN 8    Pt is at High nutrition risk    Tamara Kidd  Dietetic Intern

## 2025-03-26 NOTE — PROGRESS NOTES
Received patient at dye of shift, IVIG infusing and completed. Alert and oriented x4, on RA. No complaints of difficulty breathing. Tingling present to BLE, unchanged over night. Bedside swallow eval done - pt passed. PRN tylenol given for headache.

## 2025-03-26 NOTE — PROGRESS NOTES
[facility]   part of [facility]     Hospitalist Progress Note     Elham Sotelo Patient Status:  Inpatient    1970 MRN [id number]   Location [facility] 4SW-A Attending Madhav Dudley DO   Hosp Day # 1 PCP Phillip Hawthorne MD     Chief Complaint: Weakness/SOB    Subjective:     Patient seen and examined. Feels generally weak. Breathing shallow.     Objective:    Review of Systems:   A comprehensive review of systems was completed; pertinent positive and negatives stated in subjective.    Vital signs:  Temp:  [98 °F (36.7 °C)-99 °F (37.2 °C)] 98.2 °F (36.8 °C)  Pulse:  [56-83] 56  Resp:  [14-26] 16  BP: ()/(46-91) 104/57  SpO2:  [96 %-100 %] 100 %    Physical Exam:    General: No acute distress  Respiratory: No wheezes, no rhonchi  Cardiovascular: S1, S2, regular rate and rhythm  Abdomen: Soft, Non-tender, non-distended, positive bowel sounds  Neuro: No new focal deficits.   Extremities: No edema    Diagnostic Data:    Labs:  Recent Labs   Lab 256 25  0404   WBC 5.6 4.5   HGB 13.8 12.2   MCV 86.3 86.8   .0 220.0       Recent Labs   Lab 25  1646 25  0404   * 90   BUN 8* 8*   CREATSERUM 0.75 0.61   CA 9.6 8.9   ALB 4.5 3.7    144   K 3.4* 3.8  3.8    113*   CO2 27.0 22.0   ALKPHO 84 70   AST 15 12   ALT 9* 8*   BILT 0.3 0.4   TP 6.9 6.4       Estimated Glomerular Filtration Rate: 106 mL/min/1.73m2 (result from lab).    Recent Labs   Lab 25   TROPHS <3       No results for input(s): \"PTP\", \"INR\" in the last 168 hours.    Lab Results   Component Value Date    TSH 1.105 2025             Microbiology    No results found for this visit on 25.      Imaging: Reviewed in Epic.    Medications:    levetiracetam  1,000 mg Oral BID    topiramate  100 mg Oral BID    immune globulin  0.4 g/kg (Ideal) Intravenous Q24H    hydrocortisone Na succinate PF  50 mg Intravenous Q6H    acetaminophen  650 mg Oral Q24H     diphenhydrAMINE  50 mg Oral Q24H    pyridostigmine  90 mg Oral TID    acetaminophen  650 mg Oral See Admin Instructions    diphenhydrAMINE  50 mg Oral See Admin Instructions    enoxaparin  40 mg Subcutaneous Daily    pantoprazole  40 mg Intravenous QAM AC    Or    pantoprazole  40 mg Oral QAM AC       Assessment & Plan:      #Myasthenia gravis flare  -Mestinon, steroids, IVIG per neuro  -Monitor respiratory status  -Ok for regular diet per SLP    #H/o adrenal insufficiency   -Currently on IV hydrocortisone    #Mild persistent asthma    #GERD  -PPI    #Seizure disorder  -Esthela Dudley,     Supplementary Documentation:     Quality:  DVT Mechanical Prophylaxis:   SCDs,    DVT Pharmacologic Prophylaxis   Medication    enoxaparin (Lovenox) 40 MG/0.4ML SUBQ injection 40 mg                Code Status: Full Code  Castillo: No urinary catheter in place  Castillo Duration (in days):   Central line:    ANDRÉS:     Discharge is dependent on: clinical progress  At this point Ms. Sotelo is expected to be discharge to: home     The 21st Century Cures Act makes medical notes like these available to patients in the interest of transparency. Please be advised this is a medical document. Medical documents are intended to carry relevant information, facts as evident, and the clinical opinion of the practitioner. The medical note is intended as peer to peer communication and may appear blunt or direct. It is written in medical language and may contain abbreviations or verbiage that are unfamiliar.

## 2025-03-26 NOTE — PHYSICAL THERAPY NOTE
PHYSICAL THERAPY EVALUATION - INPATIENT     Room Number: 455/455-A  Evaluation Date: 3/26/2025  Type of Evaluation: Initial  Physician Order: PT Eval and Treat    Presenting Problem: weakness  Co-Morbidities : ICH 2020, MG, seizure, tremor, asthma, fibromyalgia  Reason for Therapy: Mobility Dysfunction and Discharge Planning    PHYSICAL THERAPY ASSESSMENT   Patient is a 54 year old female admitted 3/25/2025 for MG crisis.  Prior to admission, patient's baseline is ind.  Patient is currently functioning near baseline with bed mobility, transfers, and gait.  Patient is requiring contact guard assist as a result of the following impairments: decreased functional strength.  Physical Therapy will continue to follow for duration of hospitalization.    Patient will benefit from continued skilled PT Services with no additional skilled services but increased support at home.    PLAN DURING HOSPITALIZATION        PT Treatment Plan: Gait training, Range of motion, Neuromuscular re-educate, Strengthening, Transfer training, Balance training, Bed mobility, Body mechanics, Endurance, Energy conservation  Rehab Potential : Good  Frequency (Obs):  (2-3x/week)     CURRENT GOALS    Goal #1 Patient is able to demonstrate supine - sit EOB @ level: supervision     Goal #2 Patient is able to demonstrate transfers Sit to/from Stand at assistance level: supervision     Goal #3 Patient is able to ambulate 200 feet with assist device:  LRAD  at assistance level: supervision     Goal #4    Goal #5    Goal #6    Goal Comments: Goals established on 3/26/2025      PHYSICAL THERAPY MEDICAL/SOCIAL HISTORY  History related to current admission: Patient is a 54 year old female admitted on 3/25/2025 from home for weakness.  Pt diagnosed with MG crisis.    HOME SITUATION  Type of Home: House  Home Layout: Two level                     Lives With: Spouse    Drives: No   Patient Regularly Uses: Reading glasses      Prior Level of [place]: Pt  reports ind PTA.  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    SUBJECTIVE  \"I feel okay.\"      OBJECTIVE  Precautions: Bed/chair alarm  Fall Risk: Standard fall risk    WEIGHT BEARING RESTRICTION     PAIN ASSESSMENT  Ratin          COGNITION  Overall Cognitive Status:  WFL - within functional limits    RANGE OF MOTION AND STRENGTH ASSESSMENT  Upper extremity ROM and strength are within functional limits     Lower extremity ROM is within functional limits     Lower extremity strength is within functional limits     BALANCE  Static Sitting: Good  Dynamic Sitting: Good  Static Standing: Fair -  Dynamic Standing: Fair -    ADDITIONAL TESTS                                    ACTIVITY TOLERANCE                         O2 WALK       NEUROLOGICAL FINDINGS                        AM-PAC '6-Clicks' INPATIENT SHORT FORM - BASIC MOBILITY  How much difficulty does the patient currently have...  Patient Difficulty: Turning over in bed (including adjusting bedclothes, sheets and blankets)?: None   Patient Difficulty: Sitting down on and standing up from a chair with arms (e.g., wheelchair, bedside commode, etc.): A Little   Patient Difficulty: Moving from lying on back to sitting on the side of the bed?: A Little   How much help from another person does the patient currently need...   Help from Another: Moving to and from a bed to a chair (including a wheelchair)?: A Little   Help from Another: Need to walk in hospital room?: A Little   Help from Another: Climbing 3-5 steps with a railing?: A Little     AM-PAC Score:  Raw Score: 19   Approx Degree of Impairment: 41.77%   Standardized Score (AM-PAC Scale): 45.44   CMS Modifier (G-Code): CK    FUNCTIONAL ABILITY STATUS  Gait Assessment   Functional Mobility/Gait Assessment  Gait Assistance: Contact guard assist  Distance (ft): [id number]; 10ftx1  Assistive Device: Rolling walker  Pattern: Shuffle    Skilled Therapy Provided     Bed Mobility:  Rolling: ind  Supine to sit: ind   Sit to supine:  NT     Transfer Mobility:  Sit to stand: CGA   Stand to sit: CGA  Gait = Pt gait trained with RW to assist with energy conservation.  Noted slight SOB at times, however then dissipates.      Therapist's Comments: Pt completed toileting with OT.  Pt left up in chair at end of session.    Exercise/Education Provided:  Bed mobility  Body mechanics  Functional activity tolerated  Gait training  Transfer training    Patient End of Session: Up in chair, Needs met, Call light within reach, RN aware of session/findings, All patient questions and concerns addressed, Alarm set, Discussed recommendations with /      Patient Evaluation Complexity Level:  History Moderate - 1 or 2 personal factors and/or co-morbidities   Examination of body systems Low -  addressing 1-2 elements   Clinical Presentation Low- Stable   Clinical Decision Making Low Complexity       PT Session Time: 20 minutes  Gait Trainin minutes

## 2025-03-26 NOTE — OCCUPATIONAL THERAPY NOTE
OCCUPATIONAL THERAPY EVALUATION - INPATIENT     Room Number: 455/455-A  Evaluation Date: 3/26/2025  Type of Evaluation: Initial  Presenting Problem: myasthenia gravis exacerbation    Physician Order: IP Consult to Occupational Therapy  Reason for Therapy: ADL/IADL Dysfunction and Discharge Planning    OCCUPATIONAL THERAPY ASSESSMENT   Patient is currently functioning near baseline with toileting, bathing, upper body dressing, lower body dressing, grooming, transfers, dynamic standing balance, and functional standing tolerance. Prior to admission, patient's baseline is independent without  a device, drives, works in retail.  Patient is requiring supervision to CGA as a result of the following impairments: decreased functional strength, impaired standing balance, and decreased muscular endurance. Occupational Therapy will continue to follow for duration of hospitalization.    Patient will benefit from continued skilled OT Services for duration of hospitalization, however, given the patient is functioning near baseline level do not anticipate skilled therapy needs at discharge     History Related to Current Admission: Patient is a 54 year old female admitted on 3/25/2025 with Presenting Problem: myasthenia gravis exacerbation. Co-Morbidities : ICH 2020, MG, seizure, tremor, asthma, fibromyalgia    WEIGHT BEARING RESTRICTION       Recommendations for nursing staff:   Transfers: 1 person , RW  Toileting location: bathroom     EVALUATION SESSION:  Patient Start of Session: supine    FUNCTIONAL TRANSFER ASSESSMENT  Sit to Stand: Edge of Bed  Edge of Bed: Supervision  Toilet Transfer: Supervision    BED MOBILITY  Supine to Sit : Supervision  Scooting: supervision    BALANCE ASSESSMENT     FUNCTIONAL ADL ASSESSMENT  Grooming Standing: Stand-by Assist  LB Dressing Seated: Supervision  Toileting Seated: Supervision  Toileting Standing: Stand-by Assist    ACTIVITY TOLERANCE: O2 saturations were stable on room air with activity,  however noted to have rapid breathing; RR remained in 20s                         O2 SATURATIONS  Oxygen Therapy  SPO2% on Room Air at Rest: 98  SPO2% Ambulation on Oxygen: 96    COGNITION  Overall Cognitive Status:  WFL - within functional limits    Upper Extremity   ROM: within functional limits   Strength: within functional limits elbow flexion and extension 5/5,  4/5  Coordination  Gross motor: wfl  Fine motor: wfl  Sensation: denied UE deficits    EDUCATION PROVIDED  Patient Education : Role of Occupational Therapy; Plan of Care; Energy Conservation  Patient's Response to Education: Verbalized Understanding    Equipment used: rw, gait belt  Demonstrates functional use, Would benefit from additional trial yes     Therapist comments:   Patient completed functional mobility with RW    Patient End of Session: Hospital anti-slip socks, All patient questions and concerns addressed, RN aware of session/findings, Call light within reach, Needs met, Up in chair, Alarm set    OCCUPATIONAL PROFILE    HOME SITUATION  Type of Home: House  Home Layout: Two level  Lives With: Spouse    Toilet and Equipment: Standard height toilet  Shower/Tub and Equipment: Walk-in shower       Occupation/Status: retail     Drives: No  Patient Regularly Uses: Reading glasses    Prior Level of Function: Independent, lives with spouse in a 2 level house. Has a jar opener but otherwise no specialized ADL equipment at home. Paces self for ADLs as needed depending on the day. Works retail in a store. Spouse able to assist with IADLs as needed    SUBJECTIVE   Pleasant and participatory    PAIN ASSESSMENT  Ratin          OBJECTIVE  Precautions: Bed/chair alarm  Fall Risk: Standard fall risk    ASSESSMENTS    AM-PAC ‘6-Clicks’ Inpatient Daily Activity Short Form  -   Putting on and taking off regular lower body clothing?: A Little  -   Bathing (including washing, rinsing, drying)?: A Little  -   Toileting, which includes using toilet, bedpan  or urinal? : A Little  -   Putting on and taking off regular upper body clothing?: A Little  -   Taking care of personal grooming such as brushing teeth?: None  -   Eating meals?: None    AM-PAC Score:  Score: 20  Approx Degree of Impairment: 38.32%  Standardized Score (AM-PAC Scale): 42.03    ADDITIONAL TESTS     NEUROLOGICAL FINDINGS      COGNITION ASSESSMENTS     PLAN     OT Treatment Plan: UE strengthening/ROM, ADL training, Energy conservation/work simplification techniques, Patient/Family education, Equipment eval/education, Patient/Family training, Compensatory technique education  Rehab Potential : Good  Frequency: 3-5x/week  Number of Visits to Meet Established Goals: 2    ADL Goals   Patient will perform grooming: with supervision and while standing at sink  Patient will perform lower body dressing:  with supervision  Patient will perform toileting: with supervision    Functional Transfer Goals  Patient will transfer to toilet:  with supervision    UE Exercise Program Goal  Patient will be supervision with bilateral AROM HEP (home exercise program).    Additional Goals    Patient Evaluation Complexity Level:   Occupational Profile/Medical History MODERATE - Expanded review of history including review of medical or therapy record   Specific performance deficits impacting engagement in ADL/IADL MODERATE  3 - 5 performance deficits   Client Assessment/Performance Deficits MODERATE - Comorbidities and min to mod modifications of tasks    Clinical Decision Making LOW - Analysis of occupational profile, problem-focused assessments, limited treatment options    Overall Complexity LOW     OT Session Time: 24 minutes  Self-Care Home Management: 8 minutes  Therapeutic Activity: 12 minutes
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
## 2025-03-26 NOTE — PLAN OF CARE
Patient to ICU from ED at approximately 1850. Alert and oriented on room air. See MAR and flowsheets for more information.

## 2025-03-26 NOTE — PLAN OF CARE
Assumed care of pt after RN report. Alert and oriented. RA. BP borderline. NSR/SB. Minimal PO intake. Ambulated in halls x2, extremely fatigued following. Voiding. BM x1. See flowsheets for full assessments. POC discussed with pt.

## 2025-03-26 NOTE — PROGRESS NOTES
CRITICAL CARE PROGRESS NOTE    Patient Name: Elham Sotelo  : 1970  MRN: [id number]  Admit Date: 3/25/2025  Length of Stay: 1 Days    Subjective/24h events: No events overnight. Still complaint of fatigue, mildly short of breath, and unable to eat.     Physical Exam  General: No acute distress  Neuro: AO x3, non focal   Lungs: Clear  Cardio:  RRR  Abdomen: Soft, non tender, non distended  Extremities: Warm, no swelling     Hemodynamics  24h Vitals:  VitalLast Value (24 Hour)24 Hour Range  Temp99 °F (37.2 °C)Temp  Min: 98 °F (36.7 °C)  Max: 99 °F (37.2 °C)  [id number]  Min: 57  Max: 83  BP (Non-Invasive)95/61 BP  Min: 87/46  Max: 118/69  BP (A-Line) No data recorded  CVP  could not be evaluated. This SmartLink does not work with rows of the type:   [id number]  Min: 14  Max: 26  [id number] %SpO2  Min: 96 %  Max: 100 %  O2 Therapy         Assessment and Plan:  Myasthenic crisis  Hypokalemia  H/o Seizure disorder  H/o Migraine  H/o Adrenal insufficiency/adenoma  H/o Asthma  H/o GERD  H/o Fibromyalgia    Neuro: IVIG #2/5 this morning. Home pyridostigmine, keppra, topiramate resumed. Neurology following, appreciate input.     CV: Hemodynamically stable. Non tachycardic. Low normal BP overnight with systolic 80-90. Cortisol came back at 3. Will trial IV steroids given history- currently not on any steroids outpatient.     Pulm: On room air. Nif/VC marginally improved -18/. CT chest per neuro to eval for thymoma. Hx of asthma? On no meds outpatient.     GI: Diet as tolerated, PPI    Renal: No acute issues. Voiding freely. Electrolyte protocol. D5LR 75cc/hr until taking adequate PO.    ID: No acute issues     Heme: No acute issues    Endo: Trial of steroids per above    ICU checklist  Feeding: Regular  Analgesia: PRN Tylenol  Sedation: NA  Thromboembolism PPX: LMWH  Stress Ulcer PPX: PPI  Glucose control: <180  Bowel Regimen: PRN  Lines/drains/tubes: PIV  Deescalation of antibiotics: NA  Therapies: PT/OT/ST consulted      Code Status: Full Code     Discussed with critical care intensivist Dr. Leon Duong, [facility]  Critical Care Nurse Practitioner

## 2025-03-27 ENCOUNTER — APPOINTMENT (OUTPATIENT)
Dept: GENERAL RADIOLOGY | Facility: HOSPITAL | Age: 55
End: 2025-03-27
Payer: COMMERCIAL

## 2025-03-27 LAB
ALBUMIN SERPL-MCNC: 3.7 G/DL (ref 3.2–4.8)
ALBUMIN/GLOB SERPL: 1.1 {RATIO} (ref 1–2)
ALP LIVER SERPL-CCNC: 72 U/L
ALT SERPL-CCNC: 8 U/L
ANION GAP SERPL CALC-SCNC: 7 MMOL/L (ref 0–18)
AST SERPL-CCNC: 10 U/L (ref ?–34)
BASOPHILS # BLD AUTO: 0.02 X10(3) UL (ref 0–0.2)
BASOPHILS NFR BLD AUTO: 0.3 %
BILIRUB SERPL-MCNC: 0.4 MG/DL (ref 0.3–1.2)
BUN BLD-MCNC: 8 MG/DL (ref 9–23)
CALCIUM BLD-MCNC: 9.3 MG/DL (ref 8.7–10.6)
CHLORIDE SERPL-SCNC: 113 MMOL/L (ref 98–112)
CO2 SERPL-SCNC: 23 MMOL/L (ref 21–32)
CREAT BLD-MCNC: 0.63 MG/DL
EGFRCR SERPLBLD CKD-EPI 2021: 105 ML/MIN/1.73M2 (ref 60–?)
EOSINOPHIL # BLD AUTO: 0 X10(3) UL (ref 0–0.7)
EOSINOPHIL NFR BLD AUTO: 0 %
ERYTHROCYTE [DISTWIDTH] IN BLOOD BY AUTOMATED COUNT: 12.3 %
GLOBULIN PLAS-MCNC: 3.5 G/DL (ref 2–3.5)
GLUCOSE BLD-MCNC: 152 MG/DL (ref 70–99)
GLUCOSE BLD-MCNC: 152 MG/DL (ref 70–99)
HCT VFR BLD AUTO: 37.7 %
HGB BLD-MCNC: 12.7 G/DL
IMM GRANULOCYTES # BLD AUTO: 0.02 X10(3) UL (ref 0–1)
IMM GRANULOCYTES NFR BLD: 0.3 %
LYMPHOCYTES # BLD AUTO: 1.12 X10(3) UL (ref 1–4)
LYMPHOCYTES NFR BLD AUTO: 18.5 %
MAGNESIUM SERPL-MCNC: 2 MG/DL (ref 1.6–2.6)
MCH RBC QN AUTO: 29.1 PG (ref 26–34)
MCHC RBC AUTO-ENTMCNC: 33.7 G/DL (ref 31–37)
MCV RBC AUTO: 86.5 FL
MONOCYTES # BLD AUTO: 0.23 X10(3) UL (ref 0.1–1)
MONOCYTES NFR BLD AUTO: 3.8 %
NEUTROPHILS # BLD AUTO: 4.65 X10 (3) UL (ref 1.5–7.7)
NEUTROPHILS # BLD AUTO: 4.65 X10(3) UL (ref 1.5–7.7)
NEUTROPHILS NFR BLD AUTO: 77.1 %
OSMOLALITY SERPL CALC.SUM OF ELEC: 297 MOSM/KG (ref 275–295)
PHOSPHATE SERPL-MCNC: 2.3 MG/DL (ref 2.4–5.1)
PLATELET # BLD AUTO: 235 10(3)UL (ref 150–450)
POTASSIUM SERPL-SCNC: 3.3 MMOL/L (ref 3.5–5.1)
POTASSIUM SERPL-SCNC: 3.3 MMOL/L (ref 3.5–5.1)
PROT SERPL-MCNC: 7.2 G/DL (ref 5.7–8.2)
RBC # BLD AUTO: 4.36 X10(6)UL
SODIUM SERPL-SCNC: 143 MMOL/L (ref 136–145)
WBC # BLD AUTO: 6 X10(3) UL (ref 4–11)

## 2025-03-27 PROCEDURE — 99233 SBSQ HOSP IP/OBS HIGH 50: CPT | Performed by: OTHER

## 2025-03-27 PROCEDURE — 99232 SBSQ HOSP IP/OBS MODERATE 35: CPT | Performed by: HOSPITALIST

## 2025-03-27 PROCEDURE — 71045 X-RAY EXAM CHEST 1 VIEW: CPT

## 2025-03-27 PROCEDURE — 99232 SBSQ HOSP IP/OBS MODERATE 35: CPT | Performed by: INTERNAL MEDICINE

## 2025-03-27 RX ORDER — POTASSIUM CHLORIDE 14.9 MG/ML
20 INJECTION INTRAVENOUS ONCE
Status: COMPLETED | OUTPATIENT
Start: 2025-03-27 | End: 2025-03-27

## 2025-03-27 NOTE — PAYOR COMM NOTE
--------------  3/27 CONTINUED STAY REVIEW    Payor: [employer] OUT OF STATE P[employer]  Subscriber #:  [id number]  Authorization Number: [id number]      CRITICAL CARE PROGRESS NOTE      Subjective/24h events: No events overnight - fatigues easily with exertion. Nif and VC improved this morning. Plan for IVIG #3/5 today.      Physical Exam  General: No acute distress  Neuro: AO x3, non focal   Lungs: Clear  Cardio:  RRR  Abdomen: Soft, non tender, non distended  Extremities: Warm, no swelling      Hemodynamics  24h Vitals:  VitalLast Value (24 Hour)24 Hour Range  Temp97.7 °F (36.5 °C)Temp  Min: 97 °F (36.1 °C)  Max: 98.3 °F (36.8 °C)  [id number]  Min: 52  Max: 91  BP (Non-Invasive)95/55 BP  Min: 89/50  Max: 114/65  BP (A-Line) No data recorded  CVP  could not be evaluated. This SmartLink does not work with rows of the type:   [id number]  Min: 13  Max: 36  [id number] %SpO2  Min: 95 %  Max: 100 %  O2 Therapy        Assessment and Plan:  Myasthenic crisis  Hypokalemia  Hyperglycemia - steroid induced  H/o Seizure disorder  H/o Migraine  H/o Adrenal insufficiency/adenoma  H/o Asthma  H/o GERD  H/o Fibromyalgia     Neuro: IVIG #3/5 this morning. Home pyridostigmine - dose increased, keppra, topiramate resumed. Neurology following, appreciate input.      CV: Hemodynamically stable. Non tachycardic. Blood pressure appears to have improved with steroids.      Pulm: On room air. Nif/VC improved this morning- 40/2L. CT chest per neuro to eval for thymoma yesterday with nothing acute. Hx of asthma? On no meds outpatient. Pulm consulted by hospitalist      GI: Diet as tolerated, PPI -DC?     Renal: No acute issues. Voiding freely. Electrolyte protocol. Stop IVF today     ID: No acute issues      Heme: No acute issues     Endo: Continue hydrocortisone, mildly hyperglycemic - monitor      ICU checklist  Feeding: Regular  Analgesia: PRN Tylenol  Sedation: NA  Thromboembolism PPX: LMWH  Stress Ulcer PPX: PPI  Glucose control: <180  Bowel Regimen:  PRN  Lines/drains/tubes: PIV  Deescalation of antibiotics: NA  Therapies: PT/OT/ST consulted      Code Status: Full Code     Dispo: Can transfer to medical floor today                 HOSPITALIST:     Feels like breathing and strength a bit better today.       Vital signs:  Temp:  [97 °F (36.1 °C)-98 °F (36.7 °C)] 97.8 °F (36.6 °C)  Pulse:  [52-91] 70  Resp:  [12-36] 33  BP: ()/(48-79) 125/61  SpO2:  [95 %-100 %] 100 %     Physical Exam:    General: No acute distress  Respiratory: No wheezes, no rhonchi  Cardiovascular: S1, S2, regular rate and rhythm  Abdomen: Soft, Non-tender, non-distended, positive bowel sounds  Neuro: No new focal deficits.   Extremities: No edema     Lab 03/25/25  1646 03/26/25  0404 03/27/25  0335   WBC 5.6 4.5 6.0   HGB 13.8 12.2 12.7   MCV 86.3 86.8 86.5   .0 220.0 235.0      * 90 152*   BUN 8* 8* 8*   CREATSERUM 0.75 0.61 0.63   CA 9.6 8.9 9.3   ALB 4.5 3.7 3.7    144 143   K 3.4* 3.8  3.8 3.3*  3.3*    113* 113*   CO2 27.0 22.0 23.0   ALKPHO 84 70 72   AST 15 12 10   ALT 9* 8* 8*   BILT 0.3 0.4 0.4   TP 6.9 6.4 7.2       Medications:   Scheduled Medications    potassium chloride  20 mEq Intravenous Once    levetiracetam  1,000 mg Oral BID    topiramate  100 mg Oral BID    immune globulin  0.4 g/kg (Ideal) Intravenous Q24H    hydrocortisone Na succinate PF  50 mg Intravenous Q6H    acetaminophen  650 mg Oral Q24H    diphenhydrAMINE  50 mg Oral Q24H    pyridostigmine  90 mg Oral TID    multivitamin  1 tablet Oral Daily    thiamine  100 mg Oral Daily    acetaminophen  650 mg Oral See Admin Instructions    diphenhydrAMINE  50 mg Oral See Admin Instructions    enoxaparin  40 mg Subcutaneous Daily             Assessment & Plan:  #Myasthenia gravis flare  -Mestinon, steroids, IVIG per neuro  -Monitor respiratory status  -Ok for regular diet per SLP     #Hypokalemia  -Replace PRN     #Steroid induced hyperglycemia      #H/o adrenal insufficiency      #Mild  persistent asthma     #GERD  -PPI     #Seizure disorder  -Keppra          MEDICATIONS ADMINISTERED IN LAST 1 DAY:  acetaminophen (Tylenol) tab 650 mg       Date Action Dose Route User    3/27/2025 1410 Given 650 mg Oral Ibeth Montaño RN          dextrose in lactated ringers 5% infusion       Date Action Dose Route User    3/27/2025 0439 New Bag (none) Intravenous Sarah Eduardo RN          diphenhydrAMINE (Benadryl) cap/tab 50 mg       Date Action Dose Route User    3/27/2025 1411 Given 50 mg Oral Ibeth Montaño RN          enoxaparin (Lovenox) 40 MG/0.4ML SUBQ injection 40 mg       Date Action Dose Route User    3/27/2025 0910 Given 40 mg Subcutaneous (Right Lower Abdomen) Ibeth Montaño RN          hydrocortisone Na succinate PF (Solu-CORTEF) injection 50 mg       Date Action Dose Route User    3/27/2025 0907 Given 50 mg Intravenous Ibeth Montaño RN    3/27/2025 0430 Given 50 mg Intravenous Sarah Eduardo RN    3/26/2025 2030 Given 50 mg Intravenous Sarah Eduardo RN    3/26/2025 1551 Given 50 mg Intravenous Yana Espinal RN          immune globulin (Gammagard) 10% infusion 25 g       Date Action Dose Route User    3/27/2025 1442 New Bag 25 g Intravenous Ibeth Montaño RN          levETIRAcetam (Keppra) tab 1,000 mg       Date Action Dose Route User    3/27/2025 0907 Given 1,000 mg Oral Ibeth Montaño RN    3/26/2025 2021 Given 1,000 mg Oral Sarah Eduardo RN          pantoprazole (Protonix) DR tab 40 mg       Date Action Dose Route User    3/27/2025 0728 Given 40 mg Oral Sarah Eduardo RN          potassium chloride 20 mEq/100mL IVPB premix 20 mEq       Date Action Dose Route User    3/27/2025 1326 New Bag 20 mEq Intravenous Ibeth Montaño RN          potassium phosphate dibasic 15 mmol in sodium chloride 0.9% 250 mL IVPB       Date Action Dose Route User    3/27/2025 0827 New Bag 15 mmol Intravenous Ibeth Montaño RN          pyridostigmine (Mestinon) tab 90 mg       Date Action Dose Route User    3/27/2025  1442 Given 90 mg Oral Ibeth Montaño RN    3/27/2025 0907 Given 90 mg Oral Ibeth Montaño RN    3/26/2025 2023 Given 90 mg Oral Sarah Eduardo RN    3/26/2025 1551 Given 90 mg Oral Yana Espinal RN          multivitamin (Tab-A-Tuyet/Beta Carotene) tab 1 tablet       Date Action Dose Route User    3/27/2025 0907 Given 1 tablet Oral Ibeth Montaño RN          thiamine (Vitamin B1) tab 100 mg       Date Action Dose Route User    3/27/2025 0907 Given 100 mg Oral Ibeth Montaño RN    3/26/2025 1551 Given 100 mg Oral Yana Espinal RN          topiramate (TopaMAX) tab 100 mg       Date Action Dose Route User    3/27/2025 0907 Given 100 mg Oral Ibeth Montaño RN    3/26/2025 2023 Given 100 mg Oral Sarah Eduardo RN            Vitals (last day)       Date/Time Temp Pulse Resp BP SpO2 Weight O2 Device O2 Flow Rate (L/min) [facility]    03/27/25 1200 97.8 °F (36.6 °C) 70 33 125/61 100 % -- None (Room air) --     03/27/25 1000 -- 69 12 108/64 100 % -- -- --     03/27/25 0900 -- 83 23 96/79 100 % -- -- --     03/27/25 0845 98 °F (36.7 °C) -- -- -- 100 % -- None (Room air) --     03/27/25 0800 -- 61 15 100/63 100 % -- -- --     03/27/25 0700 -- 69 19 95/55 100 % -- -- -- SB    03/27/25 0600 -- 65 17 95/51 96 % -- -- -- SB    03/27/25 0500 -- 64 21 94/58 98 % -- -- -- SB    03/27/25 0400 97.7 °F (36.5 °C) 67 16 98/58 99 % 150 lb 12.7 oz (68.4 kg) None (Room air) -- SB    03/27/25 0300 -- 69 15 96/62 98 % -- -- -- SB    03/27/25 0200 -- 60 17 114/65 99 % -- -- -- SB    03/27/25 0100 -- 57 13 111/62 99 % -- -- -- SB    03/27/25 0000 97 °F (36.1 °C) 69 17 100/57 95 % -- None (Room air) -- SB    03/26/25 2300 -- 68 19 90/60 97 % -- -- -- SB    03/26/25 2200 -- 78 19 94/51 96 % -- -- -- SB    03/26/25 2100 -- 91 26 104/58 100 % -- -- -- SB 03/26/25 2000 97.6 °F (36.4 °C) 79 18 96/62 97 % -- None (Room air) -- SB    03/26/25 1900 -- 72 18 100/63 99 % -- -- -- SB    03/26/25 1800 -- 69 13 103/67 99 % -- -- -- LISA    03/26/25 1700  -- 90 36 105/60 -- -- -- -- LISA    03/26/25 1600 -- 59 20 101/58 98 % -- None (Room air) -- LISA

## 2025-03-27 NOTE — PROGRESS NOTES
03/26/25 2003   Spontaneous Parameters   $ Spontaneous Vital Capacity 2.89   Negative Inspiratory Force -15     Good effort., best of 3

## 2025-03-27 NOTE — PROGRESS NOTES
[facility]   part of [facility]     Hospitalist Progress Note     Elham Sotelo Patient Status:  Inpatient    1970 MRN [id number]   Location [facility] 4SW-A Attending Madhav Dudley,    Hosp Day # 2 PCP Phillip Hawthorne MD     Chief Complaint: Weakness/SOB    Subjective:     Patient seen and examined. Feels like breathing and strength a bit better today.     Objective:    Review of Systems:   A comprehensive review of systems was completed; pertinent positive and negatives stated in subjective.    Vital signs:  Temp:  [97 °F (36.1 °C)-98 °F (36.7 °C)] 97.8 °F (36.6 °C)  Pulse:  [52-91] 70  Resp:  [12-36] 33  BP: ()/(48-79) 125/61  SpO2:  [95 %-100 %] 100 %    Physical Exam:    General: No acute distress  Respiratory: No wheezes, no rhonchi  Cardiovascular: S1, S2, regular rate and rhythm  Abdomen: Soft, Non-tender, non-distended, positive bowel sounds  Neuro: No new focal deficits.   Extremities: No edema    Diagnostic Data:    Labs:  Recent Labs   Lab 25  1646 25  0404 25  0335   WBC 5.6 4.5 6.0   HGB 13.8 12.2 12.7   MCV 86.3 86.8 86.5   .0 220.0 235.0       Recent Labs   Lab 25  1646 25  0404 25  0335   * 90 152*   BUN 8* 8* 8*   CREATSERUM 0.75 0.61 0.63   CA 9.6 8.9 9.3   ALB 4.5 3.7 3.7    144 143   K 3.4* 3.8  3.8 3.3*  3.3*    113* 113*   CO2 27.0 22.0 23.0   ALKPHO 84 70 72   AST 15 12 10   ALT 9* 8* 8*   BILT 0.3 0.4 0.4   TP 6.9 6.4 7.2       Estimated Glomerular Filtration Rate: 105 mL/min/1.73m2 (result from lab).    Recent Labs   Lab 25   TROPHS <3       No results for input(s): \"PTP\", \"INR\" in the last 168 hours.                 Microbiology    No results found for this visit on 25.      Imaging: Reviewed in Epic.    Medications:    potassium chloride  20 mEq Intravenous Once    levetiracetam  1,000 mg Oral BID    topiramate  100 mg Oral BID    immune globulin  0.4 g/kg (Ideal)  Intravenous Q24H    hydrocortisone Na succinate PF  50 mg Intravenous Q6H    acetaminophen  650 mg Oral Q24H    diphenhydrAMINE  50 mg Oral Q24H    pyridostigmine  90 mg Oral TID    multivitamin  1 tablet Oral Daily    thiamine  100 mg Oral Daily    acetaminophen  650 mg Oral See Admin Instructions    diphenhydrAMINE  50 mg Oral See Admin Instructions    enoxaparin  40 mg Subcutaneous Daily       Assessment & Plan:      #Myasthenia gravis flare  -Mestinon, steroids, IVIG per neuro  -Monitor respiratory status  -Ok for regular diet per SLP    #Hypokalemia  -Replace PRN    #Steroid induced hyperglycemia     #H/o adrenal insufficiency     #Mild persistent asthma    #GERD  -PPI    #Seizure disorder  -Esthela Dudley,     Supplementary Documentation:     Quality:  DVT Mechanical Prophylaxis:   SCDs,    DVT Pharmacologic Prophylaxis   Medication    enoxaparin (Lovenox) 40 MG/0.4ML SUBQ injection 40 mg                Code Status: Full Code  Castillo: No urinary catheter in place  Castillo Duration (in days):   Central line:    ANDRÉS:     Discharge is dependent on: clinical progress  At this point Ms. Sotelo is expected to be discharge to: home     The 21st Century Cures Act makes medical notes like these available to patients in the interest of transparency. Please be advised this is a medical document. Medical documents are intended to carry relevant information, facts as evident, and the clinical opinion of the practitioner. The medical note is intended as peer to peer communication and may appear blunt or direct. It is written in medical language and may contain abbreviations or verbiage that are unfamiliar.     Dietitian Malnutrition Assessment    Evaluation for Malnutrition: Criteria for severe malnutrition diagnosis- acute illness/injury related to Wt loss greater than 7.5% in 3 months., Energy intake less than 50% for greater than 5 days.                 RD Malnutrition Care Plan: See RD nutrition assessment  for additional recommendations.    Body Fat/Muscle Mass:          Physician Assessment     Patient has a diagnosis of severe malnutrition

## 2025-03-27 NOTE — PROGRESS NOTES
CRITICAL CARE PROGRESS NOTE    Patient Name: Elham Sotelo  : 1970  MRN: [id number]  Admit Date: 3/25/2025  Length of Stay: 2 Days    Subjective/24h events: No events overnight - fatigues easily with exertion. Nif and VC improved this morning. Plan for IVIG #3/5 today.     Physical Exam  General: No acute distress  Neuro: AO x3, non focal   Lungs: Clear  Cardio:  RRR  Abdomen: Soft, non tender, non distended  Extremities: Warm, no swelling     Hemodynamics  24h Vitals:  VitalLast Value (24 Hour)24 Hour Range  Temp97.7 °F (36.5 °C)Temp  Min: 97 °F (36.1 °C)  Max: 98.3 °F (36.8 °C)  [id number]  Min: 52  Max: 91  BP (Non-Invasive)95/55 BP  Min: 89/50  Max: 114/65  BP (A-Line) No data recorded  CVP  could not be evaluated. This SmartLink does not work with rows of the type:   [id number]  Min: 13  Max: 36  [id number] %SpO2  Min: 95 %  Max: 100 %  O2 Therapy         Assessment and Plan:  Myasthenic crisis  Hypokalemia  Hyperglycemia - steroid induced  H/o Seizure disorder  H/o Migraine  H/o Adrenal insufficiency/adenoma  H/o Asthma  H/o GERD  H/o Fibromyalgia     Neuro: IVIG #3/5 this morning. Home pyridostigmine - dose increased, keppra, topiramate resumed. Neurology following, appreciate input.      CV: Hemodynamically stable. Non tachycardic. Blood pressure appears to have improved with steroids.      Pulm: On room air. Nif/VC improved this morning- 40/2L. CT chest per neuro to eval for thymoma yesterday with nothing acute. Hx of asthma? On no meds outpatient. Pulm consulted by hospitalist      GI: Diet as tolerated, PPI -DC?     Renal: No acute issues. Voiding freely. Electrolyte protocol. Stop IVF today     ID: No acute issues      Heme: No acute issues     Endo: Continue hydrocortisone, mildly hyperglycemic - monitor      ICU checklist  Feeding: Regular  Analgesia: PRN Tylenol  Sedation: NA  Thromboembolism PPX: LMWH  Stress Ulcer PPX: PPI  Glucose control: <180  Bowel Regimen: PRN  Lines/drains/tubes:  PIV  Deescalation of antibiotics: NA  Therapies: PT/OT/ST consulted      Code Status: Full Code     Dispo: Can transfer to medical floor today     Discussed with critical care intensivist Dr. Solomon Duong, [facility]  Critical Care Nurse Practitioner

## 2025-03-27 NOTE — PLAN OF CARE
Assumed care of patient at shift change. Patient alert and orientated x4 with mild complaint of pain in L side, declined pain medication. Patient on room air, complaints of shortness of breath with exertion. Patient with on episode of shortness of breath and dizziness with tachypnea and drowsiness. MD notified and at bedside, see new orders. Complaints of generalized weakness, up with assistance. See flowsheets and see MAR.

## 2025-03-27 NOTE — PROGRESS NOTES
03/27/25 1715   Spontaneous Parameters   $ Spontaneous Vital Capacity 1.56   Negative Inspiratory Force -30     NIF and VC this evening listed above.     Will continue to follow RT protocol.    Phill Garcia, RRT

## 2025-03-27 NOTE — PROGRESS NOTES
[facility]  [facility] Neurology Progress Note    Elham Sotelo Patient Status:  Inpatient    1970 MRN [id number]   Location [facility] 4SW-A Attending Madhav Dudley,    Hosp Day # 2 PCP Phillip Hawthorne MD     SUBJECTIVE:  She feels modestly better, able to eat more than the last several weeks to months.    MEDICATIONS:  No current outpatient medications on file.     Current Facility-Administered Medications   Medication Dose Route Frequency    potassium phosphate dibasic 15 mmol in sodium chloride 0.9% 250 mL IVPB  15 mmol Intravenous Once    Followed by    potassium chloride 20 mEq/100mL IVPB premix 20 mEq  20 mEq Intravenous Once    levETIRAcetam (Keppra) tab 1,000 mg  1,000 mg Oral BID    topiramate (TopaMAX) tab 100 mg  100 mg Oral BID    immune globulin (Gammagard) 10% infusion 25 g  0.4 g/kg (Ideal) Intravenous Q24H    hydrocortisone Na succinate PF (Solu-CORTEF) injection 50 mg  50 mg Intravenous Q6H    acetaminophen (Tylenol) tab 650 mg  650 mg Oral Q24H    diphenhydrAMINE (Benadryl) cap/tab 50 mg  50 mg Oral Q24H    pyridostigmine (Mestinon) tab 90 mg  90 mg Oral TID    multivitamin (Tab-A-Tuyet/Beta Carotene) tab 1 tablet  1 tablet Oral Daily    thiamine (Vitamin B1) tab 100 mg  100 mg Oral Daily    acetaminophen (Tylenol) tab 650 mg  650 mg Oral See Admin Instructions    diphenhydrAMINE (Benadryl) cap/tab 50 mg  50 mg Oral See Admin Instructions    enoxaparin (Lovenox) 40 MG/0.4ML SUBQ injection 40 mg  40 mg Subcutaneous Daily    acetaminophen (Tylenol Extra Strength) tab 500 mg  500 mg Oral Q4H PRN    ondansetron (Zofran) 4 MG/2ML injection 4 mg  4 mg Intravenous Q6H PRN    pantoprazole (Protonix) 40 mg in sodium chloride 0.9% PF 10 mL IV push  40 mg Intravenous QAM AC    Or    pantoprazole (Protonix) DR tab 40 mg  40 mg Oral QAM AC       PHYSICAL EXAMINATION:  VITAL SIGNS: BP 96/79   Pulse 83   Temp 98 °F (36.7 °C) (Temporal)   Resp 23   Ht 66\"   Wt 150 lb 12.7 oz (68.4 kg)   LMP  02/23/2012 (LMP Unknown)   SpO2 100%   BMI 24.34 kg/m²   GENERAL:  Patient is a 54 year old female in no acute distress.    NEUROLOGICAL:   Mental status: Oriented to person, place, and time  Speech & Language: Fluent, no dysarthria  Comprehension: Intact  Cranial Nerves: VFF, PERRL, EOMI, no nystagmus, facial sensation intact, face symmetric, tongue midline, shoulder shrug equal  Motor: tone, bulk, strength are normal in all flexors and extensors   Sensory: Intact to light touch in all limbs  Coordination: FTN intact  Tendon Reflexes: normal for habitus, no asymmetry  Gait: Deferred    DIAGNOSTIC DATA:  Labs:  Recent Labs   Lab 03/25/25 1646 03/26/25  0404 03/27/25  0335   RBC 4.67 4.18 4.36   HGB 13.8 12.2 12.7   HCT 40.3 36.3 37.7   MCV 86.3 86.8 86.5   MCH 29.6 29.2 29.1   MCHC 34.2 33.6 33.7   RDW 12.4 12.7 12.3   NEPRELIM 2.52 1.96 4.65   WBC 5.6 4.5 6.0   .0 220.0 235.0         Recent Labs   Lab 03/25/25 1646 03/26/25 0404 03/27/25  0335   * 90 152*   BUN 8* 8* 8*   CREATSERUM 0.75 0.61 0.63   EGFRCR 94 106 105   CA 9.6 8.9 9.3    144 143   K 3.4* 3.8  3.8 3.3*  3.3*    113* 113*   CO2 27.0 22.0 23.0         IMAGING:  XR CHEST AP PORTABLE  (CPT=71045)    Result Date: 3/27/2025  CONCLUSION:  Stable cardiac and mediastinal contours.  Subsegmental left basilar atelectasis without pulmonary edema or acute airspace disease.  The pleural spaces are clear.   LOCATION:  [facility]      Dictated by (CST): Shine Springer MD on 3/27/2025 at 6:28 AM     Finalized by (CST): Shine Springer MD on 3/27/2025 at 6:29 AM       CT CHEST (W+WO) (HIW=73973)    Result Date: 3/26/2025  CONCLUSION:   1. No suspicious nodule, mass or consolidation.  2. Minimal infiltration the anterior mediastinal fat is stable.  No evidence of significant residual thymus.  No significant changes since prior exam.    LOCATION:  [facility]   Dictated by (CST): Phillip Park MD on 3/26/2025 at 3:03 PM     Finalized by (CST):  Phillip Park MD on 3/26/2025 at 3:06 PM       XR CHEST AP PORTABLE  (CPT=71045)    Result Date: 3/25/2025  CONCLUSION:  No active disease seen.   LOCATION:  [facility]      Dictated by (CST): Luis Carlos Li MD on 3/25/2025 at 4:45 PM     Finalized by (CST): Luis Carlos Li MD on 3/25/2025 at 4:45 PM             ASSESSMENT:  Ms. Sotelo is a 54-year-old woman with myasthenia gravis and adrenal insufficiency who likely has multifactorial weakness in the setting of one or both conditions being essentially undertreated.     PLAN:  Principal Problem:    Myasthenia gravis in crisis (HCC)  Active Problems:    Seizure disorder (HCC)    Myasthenia gravis (HCC)    Gastroesophageal reflux disease    MG (myasthenia gravis) (HCC)    Malnutrition (HCC)  She is feeling better and making demonstrable progress.  She was encouraged to continue to work hard with therapies, including physiotherapy and respiratory therapy.  She needs an incentive spirometer to work on her inspiratory effort during the day.  She was encouraged that it was not a problem to be fatigued by the this, and to just rest afterward but this will help her get stronger.    Continue IVIG and pyridostigmine as prescribed.    German Granado MD

## 2025-03-27 NOTE — PROGRESS NOTES
03/27/25 0944   Vent Information   $ RT Standby Charge (per 15 min) 1   Spontaneous Parameters   $ Spontaneous Vital Capacity 1.78   Negative Inspiratory Force -22

## 2025-03-27 NOTE — SLP NOTE
SPEECH DAILY NOTE - INPATIENT    ASSESSMENT & PLAN   ASSESSMENT  Pt seen for dysphagia tx to assess tolerance with recommended diet, ensure proper utilization of aspiration precautions and provide pt/family education.  Patient alert and oriented in bedside chair. She reported good tolerance of PO intake, but prolonged meal times d/t fatigue. No overt s/s of aspiration observed during my visit. Patient continued to report food sticking while pointing towards sternum. Patient appeared safe to continue a regular diet and thin liquids with general safe swallowing precautions. No further SLP services recommended at this time. Education provided re: recommendations.    Diet Recommendations - Solids: Regular  Diet Recommendations - Liquids: Thin Liquids          Medication Administration Recommendations: One pill at a time    Patient Experiencing Pain: No                Treatment Plan  Treatment Plan/Recommendations: No further inpatient SLP service warranted    Interdisciplinary Communication: Discussed with RN            GOALS  Goal #1 The patient will tolerate regular consistency and thin liquids without overt signs or symptoms of aspiration with 90 % accuracy over 1-2 session(s).  Met   Goal #2 The patient/family/caregiver will demonstrate understanding and implementation of aspiration precautions and swallow strategies independently over 1-2 session(s).     Met   Goal #3       Goal #4       Goal #5       Goal #6       Goal #7       Goal #8            FOLLOW UP  Follow Up Needed (Documentation Required): No  SLP Follow-up Date: 03/27/25  Duration: 1 week    Session: 1    If you have any questions, please contact ABUNDIO Bryan

## 2025-03-27 NOTE — PROGRESS NOTES
03/27/25 0042   Spontaneous Parameters   $ Spontaneous Vital Capacity 2   Negative Inspiratory Force -40

## 2025-03-27 NOTE — PLAN OF CARE
Assumed care of pt after shift report. Alert and oriented x4. Afebrile. NSR. SB w/ sleep. Normotensive. RA. Mild dyspnea w/ exertion. NIF/VC Q6. IVIG #3, premedicated per order. Denies pain. Ambulating in diaz. Transfer orders. Pt updated and agreeable to pain of care. See MAR and flowsheets for additional information.

## 2025-03-28 LAB
ALBUMIN SERPL-MCNC: 3.7 G/DL (ref 3.2–4.8)
ALBUMIN/GLOB SERPL: 0.9 {RATIO} (ref 1–2)
ALP LIVER SERPL-CCNC: 69 U/L
ALT SERPL-CCNC: 9 U/L
ANION GAP SERPL CALC-SCNC: 11 MMOL/L (ref 0–18)
AST SERPL-CCNC: 12 U/L (ref ?–34)
BASOPHILS # BLD AUTO: 0.03 X10(3) UL (ref 0–0.2)
BASOPHILS NFR BLD AUTO: 0.5 %
BILIRUB SERPL-MCNC: 0.4 MG/DL (ref 0.3–1.2)
BUN BLD-MCNC: 10 MG/DL (ref 9–23)
CALCIUM BLD-MCNC: 9.3 MG/DL (ref 8.7–10.6)
CHLORIDE SERPL-SCNC: 111 MMOL/L (ref 98–112)
CO2 SERPL-SCNC: 23 MMOL/L (ref 21–32)
CREAT BLD-MCNC: 0.69 MG/DL
EGFRCR SERPLBLD CKD-EPI 2021: 102 ML/MIN/1.73M2 (ref 60–?)
EOSINOPHIL # BLD AUTO: 0 X10(3) UL (ref 0–0.7)
EOSINOPHIL NFR BLD AUTO: 0 %
ERYTHROCYTE [DISTWIDTH] IN BLOOD BY AUTOMATED COUNT: 12.9 %
GLOBULIN PLAS-MCNC: 3.9 G/DL (ref 2–3.5)
GLUCOSE BLD-MCNC: 120 MG/DL (ref 70–99)
HCT VFR BLD AUTO: 37.4 %
HGB BLD-MCNC: 12.6 G/DL
IMM GRANULOCYTES # BLD AUTO: 0.03 X10(3) UL (ref 0–1)
IMM GRANULOCYTES NFR BLD: 0.5 %
LYMPHOCYTES # BLD AUTO: 1.23 X10(3) UL (ref 1–4)
LYMPHOCYTES NFR BLD AUTO: 21.2 %
MAGNESIUM SERPL-MCNC: 2 MG/DL (ref 1.6–2.6)
MCH RBC QN AUTO: 29.5 PG (ref 26–34)
MCHC RBC AUTO-ENTMCNC: 33.7 G/DL (ref 31–37)
MCV RBC AUTO: 87.6 FL
MONOCYTES # BLD AUTO: 0.22 X10(3) UL (ref 0.1–1)
MONOCYTES NFR BLD AUTO: 3.8 %
NEUTROPHILS # BLD AUTO: 4.3 X10 (3) UL (ref 1.5–7.7)
NEUTROPHILS # BLD AUTO: 4.3 X10(3) UL (ref 1.5–7.7)
NEUTROPHILS NFR BLD AUTO: 74 %
OSMOLALITY SERPL CALC.SUM OF ELEC: 300 MOSM/KG (ref 275–295)
PHOSPHATE SERPL-MCNC: 3.2 MG/DL (ref 2.4–5.1)
PLATELET # BLD AUTO: 240 10(3)UL (ref 150–450)
POTASSIUM SERPL-SCNC: 3.4 MMOL/L (ref 3.5–5.1)
POTASSIUM SERPL-SCNC: 3.4 MMOL/L (ref 3.5–5.1)
PROT SERPL-MCNC: 7.6 G/DL (ref 5.7–8.2)
RBC # BLD AUTO: 4.27 X10(6)UL
SODIUM SERPL-SCNC: 145 MMOL/L (ref 136–145)
WBC # BLD AUTO: 5.8 X10(3) UL (ref 4–11)

## 2025-03-28 PROCEDURE — 99232 SBSQ HOSP IP/OBS MODERATE 35: CPT | Performed by: HOSPITALIST

## 2025-03-28 RX ORDER — POTASSIUM CHLORIDE 1500 MG/1
40 TABLET, EXTENDED RELEASE ORAL EVERY 4 HOURS
Status: DISCONTINUED | OUTPATIENT
Start: 2025-03-28 | End: 2025-03-28

## 2025-03-28 RX ORDER — POTASSIUM CHLORIDE 1.5 G/1.58G
40 POWDER, FOR SOLUTION ORAL EVERY 4 HOURS
Status: DISCONTINUED | OUTPATIENT
Start: 2025-03-28 | End: 2025-03-28

## 2025-03-28 RX ORDER — PREDNISONE 20 MG/1
20 TABLET ORAL
Qty: 30 TABLET | Refills: 2 | Status: SHIPPED | OUTPATIENT
Start: 2025-03-29

## 2025-03-28 RX ORDER — PREDNISONE 20 MG/1
20 TABLET ORAL
Status: DISCONTINUED | OUTPATIENT
Start: 2025-03-29 | End: 2025-03-29

## 2025-03-28 RX ORDER — PYRIDOSTIGMINE BROMIDE 30 MG/1
90 TABLET ORAL 3 TIMES DAILY
Qty: 30 TABLET | Refills: 2 | Status: SHIPPED | OUTPATIENT
Start: 2025-03-28

## 2025-03-28 RX ORDER — POTASSIUM CHLORIDE 1.5 G/1.58G
40 POWDER, FOR SOLUTION ORAL ONCE
Status: DISCONTINUED | OUTPATIENT
Start: 2025-03-28 | End: 2025-03-29

## 2025-03-28 NOTE — PROGRESS NOTES
Pt is A&Ox4. VSS, afebrile. SPO2 maintained on RA. IV steroids. Tele, NSR. Last BM 3/27. General/vegetarian diet. Voids. Up ad rico. Denies pain. PIV, SL. No further needs at this time, continue POC. Safety precautions in place.

## 2025-03-28 NOTE — PROGRESS NOTES
[facility]   part of [facility]     Hospitalist Progress Note     Elham Sotelo Patient Status:  Inpatient    1970 MRN [id number]   Location [facility] 4SW-A Attending Madhav Dudley,    Hosp Day # 3 PCP Phillip Hawthorne MD     Chief Complaint: Weakness/SOB    Subjective:     Patient seen and examined. Feeling better overall.     Objective:    Review of Systems:   A comprehensive review of systems was completed; pertinent positive and negatives stated in subjective.    Vital signs:  Temp:  [97.5 °F (36.4 °C)-99 °F (37.2 °C)] 97.7 °F (36.5 °C)  Pulse:  [56-87] 56  Resp:  [18-22] 18  BP: ()/(52-81) 111/59  SpO2:  [92 %-98 %] 96 %    Physical Exam:    General: No acute distress  Respiratory: No wheezes, no rhonchi  Cardiovascular: S1, S2, regular rate and rhythm  Abdomen: Soft, Non-tender, non-distended, positive bowel sounds  Neuro: No new focal deficits.   Extremities: No edema    Diagnostic Data:    Labs:  Recent Labs   Lab 25  1646 25  0404 25  0335 25  0741   WBC 5.6 4.5 6.0 5.8   HGB 13.8 12.2 12.7 12.6   MCV 86.3 86.8 86.5 87.6   .0 220.0 235.0 240.0       Recent Labs   Lab 25  0404 25  0335 25  0741   GLU 90 152* 120*   BUN 8* 8* 10   CREATSERUM 0.61 0.63 0.69   CA 8.9 9.3 9.3   ALB 3.7 3.7 3.7    143 145   K 3.8  3.8 3.3*  3.3* 3.4*  3.4*   * 113* 111   CO2 22.0 23.0 23.0   ALKPHO 70 72 69   AST 12 10 12   ALT 8* 8* 9*   BILT 0.4 0.4 0.4   TP 6.4 7.2 7.6       Estimated Glomerular Filtration Rate: 102 mL/min/1.73m2 (result from lab).    Recent Labs   Lab 25  1646   TROPHS <3       No results for input(s): \"PTP\", \"INR\" in the last 168 hours.                 Microbiology    No results found for this visit on 25.      Imaging: Reviewed in Epic.    Medications:    [START ON 3/29/2025] predniSONE  20 mg Oral Daily with breakfast    levetiracetam  1,000 mg Oral BID    topiramate  100 mg Oral BID    immune  globulin  0.4 g/kg (Ideal) Intravenous Q24H    acetaminophen  650 mg Oral Q24H    diphenhydrAMINE  50 mg Oral Q24H    pyridostigmine  90 mg Oral TID    multivitamin  1 tablet Oral Daily    thiamine  100 mg Oral Daily    acetaminophen  650 mg Oral See Admin Instructions    diphenhydrAMINE  50 mg Oral See Admin Instructions    enoxaparin  40 mg Subcutaneous Daily       Assessment & Plan:      #Myasthenia gravis flare  -Mestinon, steroids, IVIG per neuro  -Monitor respiratory status  -Ok for regular diet per SLP    #Hypokalemia  -Replace PRN    #Steroid induced hyperglycemia     #H/o adrenal insufficiency     #Mild persistent asthma    #GERD  -PPI    #Seizure disorder  -Esthela Dudley,     Supplementary Documentation:     Quality:  DVT Mechanical Prophylaxis:   SCDs,    DVT Pharmacologic Prophylaxis   Medication    enoxaparin (Lovenox) 40 MG/0.4ML SUBQ injection 40 mg                Code Status: Full Code  Castillo: No urinary catheter in place  Castillo Duration (in days):   Central line:    ANDRÉS:     Discharge is dependent on: clinical progress  At this point Ms. Sotelo is expected to be discharge to: home     The 21st Century Cures Act makes medical notes like these available to patients in the interest of transparency. Please be advised this is a medical document. Medical documents are intended to carry relevant information, facts as evident, and the clinical opinion of the practitioner. The medical note is intended as peer to peer communication and may appear blunt or direct. It is written in medical language and may contain abbreviations or verbiage that are unfamiliar.     Dietitian Malnutrition Assessment    Evaluation for Malnutrition: Criteria for severe malnutrition diagnosis- acute illness/injury related to Wt loss greater than 7.5% in 3 months., Energy intake less than 50% for greater than 5 days.                 RD Malnutrition Care Plan: See RD nutrition assessment for additional  recommendations.    Body Fat/Muscle Mass:          Physician Assessment     Patient has a diagnosis of severe malnutrition

## 2025-03-28 NOTE — PROGRESS NOTES
03/28/25 1406   Vent Information   $ RT Standby Charge (per 15 min) 1   Spontaneous Parameters   $ Spontaneous Vital Capacity 2.4   Negative Inspiratory Force -24     NIF and  VC

## 2025-03-28 NOTE — PHYSICAL THERAPY NOTE
PHYSICAL THERAPY TREATMENT NOTE - INPATIENT    Room Number: 525/525-A     Session: 1     Number of Visits to Meet Established Goals: 3    Presenting Problem: weakness  Co-Morbidities : ICH 2020, MG, seizure, tremor, asthma, fibromyalgia    PHYSICAL THERAPY ASSESSMENT   Patient demonstrates good  progress this session, goals  progressing with 3/3 met this session.      Patient is requiring modified independent and supervision. PT will sign off.   Pt with moderate sob during mobility and with minimal exertion, spo2 100% throughout session on room air.     Patient continues to benefit from continued skilled PT services: .    PLAN DURING HOSPITALIZATION        PT Treatment Plan: Gait training, Range of motion, Neuromuscular re-educate, Strengthening, Transfer training, Balance training, Bed mobility, Body mechanics, Endurance, Energy conservation  Frequency (Obs):  (2-3x/week)     CURRENT GOALS     Goal #1 Patient is able to demonstrate supine - sit EOB @ level: supervision  met   Goal #2 Patient is able to demonstrate transfers Sit to/from Stand at assistance level: supervision  met   Goal #3 Patient is able to ambulate 200 feet with assist device: LRAD at assistance level: supervision  met   Goal #4    Goal #5    Goal #6    Goal Comments: Goals established on 3/26/2025    3/28/2025 all goals  achieved     SUBJECTIVE  \" I just have difficulty catching my breath.\"    OBJECTIVE  Precautions: Bed/chair alarm    WEIGHT BEARING RESTRICTION     PAIN ASSESSMENT   Rating: Unable to rate  Location: back and shoulders       BALANCE                                                                                                                       Static Sitting: Good  Dynamic Sitting: Good           Static Standing: Fair -  Dynamic Standing: Fair -    ACTIVITY TOLERANCE                         O2 WALK       AM-PAC '6-Clicks' INPATIENT SHORT FORM - BASIC MOBILITY  How much difficulty does the patient currently have...  Patient  Difficulty: Turning over in bed (including adjusting bedclothes, sheets and blankets)?: None   Patient Difficulty: Sitting down on and standing up from a chair with arms (e.g., wheelchair, bedside commode, etc.): None   Patient Difficulty: Moving from lying on back to sitting on the side of the bed?: None   How much help from another person does the patient currently need...   Help from Another: Moving to and from a bed to a chair (including a wheelchair)?: None   Help from Another: Need to walk in hospital room?: None   Help from Another: Climbing 3-5 steps with a railing?: A Little     AM-PAC Score:  Raw Score: 23   Approx Degree of Impairment: 11.2%   Standardized Score (AM-PAC Scale): 56.93   CMS Modifier (G-Code): CI    FUNCTIONAL ABILITY STATUS  Gait Assessment   Functional Mobility/Gait Assessment  Gait Assistance: Supervision  Distance (ft): 500  Assistive Device: None  Pattern: Within Functional Limits  Stairs: Stairs  How Many Stairs: 11  Device: 1 Rail  Assist: Supervision    Skilled Therapy Provided    Bed Mobility:  Rolling: mod ind   Supine<>Sit: mod ind   Sit<>Supine: mod ind     Transfer Mobility:  Sit<>Stand: mod ind   Stand<>Sit: mod ind   Gait: sup to mod ind.     Therapist's Comments: patient was received in supine. Performed seated exercises. Patient required 8 min at EOB because of extensive dry cough with use of IS.   Education provided on  Benefits of upright position  Promotion of walking   IS reinforcement  Exercises   Body mechanics         THERAPEUTIC EXERCISES  Lower Extremity Alternating marching  Ankle pumps  Hip AB/AD  Heel raises  LAQ     Upper Extremity Scapular Retraction     Position Sitting     Repetitions   15   Sets   1     Patient End of Session: Up in chair, Needs met, Call light within reach, RN aware of session/findings, All patient questions and concerns addressed, Alarm set, Discussed recommendations with /    PT Session Time: 24 minutes  Gait  Trainin minutes  Therapeutic Activity: 10 minutes  Therapeutic Exercise: 0 minutes   Neuromuscular Re-education: 0 minutes

## 2025-03-28 NOTE — PROGRESS NOTES
03/28/25 0950   Vent Information   $ RT Standby Charge (per 15 min) 1   Spontaneous Parameters   $ Spontaneous Vital Capacity 2.16   Negative Inspiratory Force -22     Patient NIF and VC.

## 2025-03-28 NOTE — PLAN OF CARE
Received patient awake and alert x 4. Claimed she felt much better, stronger than when she came in.   O2 protocol: On room air, clear lungs, sats 98%. Denies shortness of breath.   Non-Cardiac electrolyte protocol, K 3.3, replaced.    SCD and Lovenox for VTE prophylaxis.  Offered pain meds with good pain relief.  Saline locked.   Had bowel movement 3/27  Voiding adequately, clear yellow urine.  Call light and bedside table within reach.    Up ad rico, steady.   2345: Informed patient that she will be transferred out tonight to room 525.  Report given to RN,TC.     Problem: RESPIRATORY - ADULT  Goal: Achieves optimal ventilation and oxygenation  Description: INTERVENTIONS:- Assess for changes in respiratory status- Assess for changes in mentation and behavior- Position to facilitate oxygenation and minimize respiratory effort- Oxygen supplementation based on oxygen saturation or ABGs- Provide Smoking Cessation handout, if applicable- Encourage broncho-pulmonary hygiene including cough, deep breathe, Incentive Spirometry- Assess the need for suctioning and perform as needed- Assess and instruct to report SOB or any respiratory difficulty- Respiratory Therapy support as indicated- Manage/alleviate anxiety- Monitor for signs/symptoms of CO2 retention  Outcome: Progressing     Problem: CARDIOVASCULAR - ADULT  Goal: Absence of cardiac arrhythmias or at baseline  Description: INTERVENTIONS:- Continuous cardiac monitoring, monitor vital signs, obtain 12 lead EKG if indicated- Evaluate effectiveness of antiarrhythmic and heart rate control medications as ordered- Initiate emergency measures for life threatening arrhythmias- Monitor electrolytes and administer replacement therapy as ordered  Outcome: Progressing     Problem: PAIN - ADULT  Goal: Verbalizes/displays adequate comfort level or patient's stated pain goal  Description: INTERVENTIONS:- Encourage pt to monitor pain and request assistance- Assess pain using appropriate  pain scale- Administer analgesics based on type and severity of pain and evaluate response- Implement non-pharmacological measures as appropriate and evaluate response- Consider cultural and social influences on pain and pain management- Manage/alleviate anxiety- Utilize distraction and/or relaxation techniques- Monitor for opioid side effects- Notify MD/LIP if interventions unsuccessful or patient reports new pain- Anticipate increased pain with activity and pre-medicate as appropriate  Outcome: Progressing     Problem: SAFETY ADULT - FALL  Goal: Free from fall injury  Description: INTERVENTIONS:- Assess pt frequently for physical needs- Identify cognitive and physical deficits and behaviors that affect risk of falls.- [place] fall precautions as indicated by assessment.- Educate pt/family on patient safety including physical limitations- Instruct pt to call for assistance with activity based on assessment- Modify environment to reduce risk of injury- Provide assistive devices as appropriate- Consider OT/PT consult to assist with strengthening/mobility- Encourage toileting schedule  Outcome: Progressing     Problem: DISCHARGE PLANNING  Goal: Discharge to home or other facility with appropriate resources  Description: INTERVENTIONS:- Identify barriers to discharge w/pt and caregiver- Include patient/family/discharge partner in discharge planning- Arrange for needed discharge resources and transportation as appropriate- Identify discharge learning needs (meds, wound care, etc)- Arrange for interpreters to assist at discharge as needed- Consider post-discharge preferences of patient/family/discharge partner- Complete POLST form as appropriate- Assess patient's ability to be responsible for managing their own health- Refer to Case Management Department for coordinating discharge planning if the patient needs post-hospital services based on physician/LIP order or complex needs related to functional status, cognitive  ability or social support system  Outcome: Progressing

## 2025-03-28 NOTE — PROGRESS NOTES
[facility] Neurology Progress Note    Elham Sotelo Patient Status:  Inpatient    1970 MRN [id number]   Location [facility] 5NW-A Attending Madhav Dudley,    Hosp Day # 3 PCP Phillip Hawthorne MD     CC: weakness, MS exacerbation    Subjective: Patient seen for a follow up visit today, sitting up in bed, reports feeling better than yesterday. Pt reports significant improvement in generalized strength overall, able to eat better. No acute events over night. Denies any headaches, vision changes.        MEDICATIONS:  No current outpatient medications on file.     Current Facility-Administered Medications   Medication Dose Route Frequency    [START ON 3/29/2025] predniSONE (Deltasone) tab 20 mg  20 mg Oral Daily with breakfast    levETIRAcetam (Keppra) tab 1,000 mg  1,000 mg Oral BID    topiramate (TopaMAX) tab 100 mg  100 mg Oral BID    immune globulin (Gammagard) 10% infusion 25 g  0.4 g/kg (Ideal) Intravenous Q24H    acetaminophen (Tylenol) tab 650 mg  650 mg Oral Q24H    diphenhydrAMINE (Benadryl) cap/tab 50 mg  50 mg Oral Q24H    pyridostigmine (Mestinon) tab 90 mg  90 mg Oral TID    multivitamin (Tab-A-Tuyet/Beta Carotene) tab 1 tablet  1 tablet Oral Daily    thiamine (Vitamin B1) tab 100 mg  100 mg Oral Daily    acetaminophen (Tylenol) tab 650 mg  650 mg Oral See Admin Instructions    diphenhydrAMINE (Benadryl) cap/tab 50 mg  50 mg Oral See Admin Instructions    enoxaparin (Lovenox) 40 MG/0.4ML SUBQ injection 40 mg  40 mg Subcutaneous Daily    acetaminophen (Tylenol Extra Strength) tab 500 mg  500 mg Oral Q4H PRN    ondansetron (Zofran) 4 MG/2ML injection 4 mg  4 mg Intravenous Q6H PRN       REVIEW OF SYSTEMS:  A 10-point system was reviewed.  Pertinent positives and negatives are noted in HPI.      PHYSICAL EXAMINATION:  VITAL SIGNS: /59 (BP Location: Right arm)   Pulse 56   Temp 97.7 °F (36.5 °C) (Oral)   Resp 18   Ht 66\"   Wt 150 lb 12.7 oz (68.4 kg)   LMP 2012  (LMP Unknown)   SpO2 96%   BMI 24.34 kg/m²   GENERAL:  Patient is a 54 year old female in no acute distress.  HEENT:  Normocephalic, atraumatic  ABD: Soft, non tender  SKIN: Warm, dry, no rashes    NEUROLOGICAL:   Mental status: Oriented to person, place, situation and time. Regards and follows commands.  Speech: Fluent, no obvious aphasia or dysarthria  Memory and comprehension: Intact   Cranial Nerves: VFF, PERRL 3mm brisk, EOMI, no nystagmus, facial sensation intact, face symmetric, tongue midline, shoulder shrug equal, remainder CN intact  Motor: No drift, no focal arm or leg weakness bilaterally   Sensory: Intact to light touch bilaterally  Coordination: FTN intact bilaterally  Gait: Deferred      Imaging/Diagnostics:  XR CHEST AP PORTABLE  (CPT=71045)    Result Date: 3/27/2025  CONCLUSION:  Stable cardiac and mediastinal contours.  Subsegmental left basilar atelectasis without pulmonary edema or acute airspace disease.  The pleural spaces are clear.   LOCATION:  [facility]      Dictated by (CST): Shine Springer MD on 3/27/2025 at 6:28 AM     Finalized by (CST): Shine Springer MD on 3/27/2025 at 6:29 AM       CT CHEST (W+WO) (MYS=94923)    Result Date: 3/26/2025  CONCLUSION:   1. No suspicious nodule, mass or consolidation.  2. Minimal infiltration the anterior mediastinal fat is stable.  No evidence of significant residual thymus.  No significant changes since prior exam.    LOCATION:  [facility]   Dictated by (CST): Phillip Park MD on 3/26/2025 at 3:03 PM     Finalized by (CST): Phillip Park MD on 3/26/2025 at 3:06 PM       XR CHEST AP PORTABLE  (CPT=71045)    Result Date: 3/25/2025  CONCLUSION:  No active disease seen.   LOCATION:  [id number]      Dictated by (CST): Luis Carlos Li MD on 3/25/2025 at 4:45 PM     Finalized by (CST): Luis Carlos Li MD on 3/25/2025 at 4:45 PM          Labs:  Recent Labs   Lab 03/26/25  0404 03/27/25  0335 03/28/25  0741   RBC 4.18 4.36 4.27   HGB 12.2 12.7 12.6   HCT 36.3 37.7  37.4   MCV 86.8 86.5 87.6   MCH 29.2 29.1 29.5   MCHC 33.6 33.7 33.7   RDW 12.7 12.3 12.9   NEPRELIM 1.96 4.65 4.30   WBC 4.5 6.0 5.8   .0 235.0 240.0         Recent Labs   Lab 03/26/25  0404 03/27/25  0335 03/28/25  0741   GLU 90 152* 120*   BUN 8* 8* 10   CREATSERUM 0.61 0.63 0.69   EGFRCR 106 105 102   CA 8.9 9.3 9.3    143 145   K 3.8  3.8 3.3*  3.3* 3.4*  3.4*   * 113* 111   CO2 22.0 23.0 23.0       Pre-morbid mRS 1      Assessment and Plan:    A 54 year old female with:    Myasthenia gravis crisis  - CT chest didn't report any  evidence of significant residual thymus, suspicious nodule, mass or consolidation.   - Pt was started on IVIG treatment 5 days course. Dose # 4 scheduled for today.  - Pt currently on Mestinon 90 mg three times daily and will start Prednisone 20 mg daily on 3/29. Pt has been demonstrating significant progress in her weakness. Pt to continue Mestinon and Prednisone on discharge.  - NIF - 30, SVC 1.56, continue daily monitoring and recording  - Encouraged pt to use an incentive spirometer to work on her inspiratory effort during the day.   - Encouraged to take frequent rests breaks and stay well hydrated.  - PT/OT evals and recs     2. History of seizure disorder   - Pt continues home dose Keppra.   - Seizure/safety precautions    Plan of care reviewed with pt at bed side, all questions answered. Pt to follow up with primary neurologist in 4 weeks. Case discussed with nursing and Dr. Granado, further recommendations, if indicated, to follow.      Is this a shared or split note between Advanced Practice Provider and Physician? Yes     Jes Sharp APRTYRONE  [facility]  3/28/2025, 9:49 AM  [place] # [id number]

## 2025-03-28 NOTE — DIETARY NOTE
[facility]   part of [facility]    NUTRITION ASSESSMENT    Pt meets severe malnutrition criteria at this time.    CRITERIA FOR MALNUTRITION DIAGNOSIS:  Criteria for severe malnutrition diagnosis: acute illness/injury related to wt loss greater than 7.5% in 3 months and energy intake less than 50% for greater than 5 days      NUTRITION INTERVENTION:    RD nutrition Care Plan- See RD nutrition assessment for additional recommendations  Meal and Snacks - Monitor and encourage adequate PO intake.   Medical Food Supplements - Vanilla [employer] 1.0 BID. Rationale/use for oral supplements discussed.  Vitamin and Mineral Supplements - continue Multivitamin with minerals and Thiamine      PATIENT STATUS:     3/28/25- When I followed up w pt today, she said her appetite was not doing as well as it was a couple of days ago. She has been drinking the Ensures but says they have been causing gas. I mentioned [employer] as an alternative to see if this would be less bothersome and pt was agreeable to vanilla. As for meals pt said she has been ordering about one per day, usually orders bread and cucumbers. Pt is vegetarian but also avoids eggs. I suggested some of the vegan options on the menu such as the tofu scramble but pt said she does not eat tofu either. She said that she had protein powder at home and plans on mixing that with almond milk to get extra nutrients once back home. Pt says that her BMs have been normal. She remains slightly lethargic but was sitting in her chair at time of visit and was still able to adequately engage in conversation. Will continue to follow and monitor PO and ONS intake. All questions answered at this time.     03/26/25 Pt is a 53 y/o F admitted for tingling legs, SOB, lack of eating, headache, and slurred speech. Reviewing pt's chart d/t +MST w/ eval consult. Pt was lethargic at time of visit but able to answer all questions. She says that she does have an appetite and feels hungry,  but d/t swallowing and breathing difficulties, she tends to get tired and stops eating before she is done w the meal. Because of this she reports losing 45 lbs over the span of 3.5 months. Pt says that she is happy and knows she will be okay but is just very tired. Very solid foods tend to bother her and so do thicker liquids. No choking or aspiration but just uncomfortable. Pt is vegetarian and avoids chocolate d/t it giving her headaches. No BM since PTA - pt arrived yesterday. Agreeable to try either vanilla or strawberry Ensure to get nutrients more easily. I told her both could be sent for her to determine which she prefers. Will continue to follow closely and monitor PO + ONS intake. All questions answered at this time.       ANTHROPOMETRICS:  Ht: 167.6 cm (5' 6\")  Wt: 68.4 kg (150 lb 12.7 oz).   BMI: Body mass index is 24.34 kg/m².  IBW: 59.09 kg 130      WEIGHT HISTORY:   Weight loss: Yes, Severe Wt loss of 45 lbs, 23%, over 3.5 months     Wt Readings from Last 10 Encounters:   03/27/25 68.4 kg (150 lb 12.7 oz)   04/20/23 74.8 kg (165 lb)   04/01/22 79.4 kg (175 lb)   02/14/22 79.4 kg (175 lb)   09/23/21 79.4 kg (175 lb)   06/03/21 77.1 kg (170 lb)   04/26/21 77.1 kg (170 lb)   03/31/21 77.1 kg (170 lb)   03/12/21 77.1 kg (170 lb)   02/22/21 77.1 kg (170 lb)        NUTRITION:  Diet:       Procedures    Regular/General diet Is Patient on Accuchecks? No; Misc Restriction: Vegetarian      Food Allergies:  intolerance to chocolate , does not eat meat or eggs  Cultural/Ethnic/Holiness Preferences Addressed: Yes    Percent Meals Eaten (last 3 days)       Date/Time Percent Meals Eaten (%)    03/27/25 1442 100 %            GI system review: swallowing dysfunction Last BM Date: 03/27/25  Skin and wounds: WNL    NUTRITION RELATED PHYSICAL FINDINGS:     1. Body Fat/Muscle Mass: mild depletion body fat Orbital fat pad and mild muscle depletion Temple region     2. Fluid Accumulation: none    NUTRITION PRESCRIPTION:   68.2 kg 150 lbs Actual Body Weight  Calories: 1692-2291 calories/day (30-40 kcal/kg)  Protein: 102-136 grams protein/day (1.5-2.0 grams protein per kg)  Fluid: ~1 ml/kcal or per MD discretion    NUTRITION DIAGNOSIS/PROBLEM:  Malnutrition related to decreased intake and inability to take or tolerate as evidenced by documented/reported insufficient oral intake and documented/reported unintentional weight loss      MONITOR AND EVALUATE/NUTRITION GOALS:  PO intake of 75% of meals TID - Not met, Continues  PO intake of 75% of oral nutrition supplement/s - Met, continues      MEDICATIONS:  MVI, Thiamine    LABS:  K 3.4     Pt is at High nutrition risk    Tamara Kidd  Dietetic Intern

## 2025-03-29 VITALS
SYSTOLIC BLOOD PRESSURE: 102 MMHG | BODY MASS INDEX: 24.24 KG/M2 | HEIGHT: 66 IN | TEMPERATURE: 98 F | RESPIRATION RATE: 17 BRPM | DIASTOLIC BLOOD PRESSURE: 55 MMHG | OXYGEN SATURATION: 100 % | WEIGHT: 150.81 LBS | HEART RATE: 61 BPM

## 2025-03-29 LAB
ALBUMIN SERPL-MCNC: 3.4 G/DL (ref 3.2–4.8)
ALBUMIN/GLOB SERPL: 0.8 {RATIO} (ref 1–2)
ALP LIVER SERPL-CCNC: 62 U/L
ALT SERPL-CCNC: 15 U/L
ANION GAP SERPL CALC-SCNC: 7 MMOL/L (ref 0–18)
AST SERPL-CCNC: 21 U/L (ref ?–34)
BASOPHILS # BLD AUTO: 0.05 X10(3) UL (ref 0–0.2)
BASOPHILS NFR BLD AUTO: 1.1 %
BILIRUB SERPL-MCNC: 0.4 MG/DL (ref 0.3–1.2)
BUN BLD-MCNC: 9 MG/DL (ref 9–23)
CALCIUM BLD-MCNC: 9 MG/DL (ref 8.7–10.6)
CHLORIDE SERPL-SCNC: 113 MMOL/L (ref 98–112)
CO2 SERPL-SCNC: 24 MMOL/L (ref 21–32)
CREAT BLD-MCNC: 0.65 MG/DL
EGFRCR SERPLBLD CKD-EPI 2021: 104 ML/MIN/1.73M2 (ref 60–?)
EOSINOPHIL # BLD AUTO: 0.04 X10(3) UL (ref 0–0.7)
EOSINOPHIL NFR BLD AUTO: 0.9 %
ERYTHROCYTE [DISTWIDTH] IN BLOOD BY AUTOMATED COUNT: 13.2 %
GLOBULIN PLAS-MCNC: 4.1 G/DL (ref 2–3.5)
GLUCOSE BLD-MCNC: 90 MG/DL (ref 70–99)
HCT VFR BLD AUTO: 36.9 %
HGB BLD-MCNC: 12.5 G/DL
IMM GRANULOCYTES # BLD AUTO: 0.01 X10(3) UL (ref 0–1)
IMM GRANULOCYTES NFR BLD: 0.2 %
LYMPHOCYTES # BLD AUTO: 2.64 X10(3) UL (ref 1–4)
LYMPHOCYTES NFR BLD AUTO: 57.8 %
MAGNESIUM SERPL-MCNC: 2 MG/DL (ref 1.6–2.6)
MCH RBC QN AUTO: 29.5 PG (ref 26–34)
MCHC RBC AUTO-ENTMCNC: 33.9 G/DL (ref 31–37)
MCV RBC AUTO: 87 FL
MONOCYTES # BLD AUTO: 0.32 X10(3) UL (ref 0.1–1)
MONOCYTES NFR BLD AUTO: 7 %
NEUTROPHILS # BLD AUTO: 1.51 X10 (3) UL (ref 1.5–7.7)
NEUTROPHILS # BLD AUTO: 1.51 X10(3) UL (ref 1.5–7.7)
NEUTROPHILS NFR BLD AUTO: 33 %
OSMOLALITY SERPL CALC.SUM OF ELEC: 296 MOSM/KG (ref 275–295)
PHOSPHATE SERPL-MCNC: 3.6 MG/DL (ref 2.4–5.1)
PLATELET # BLD AUTO: 216 10(3)UL (ref 150–450)
POTASSIUM SERPL-SCNC: 3 MMOL/L (ref 3.5–5.1)
POTASSIUM SERPL-SCNC: 3.8 MMOL/L (ref 3.5–5.1)
POTASSIUM SERPL-SCNC: 3.9 MMOL/L (ref 3.5–5.1)
PROT SERPL-MCNC: 7.5 G/DL (ref 5.7–8.2)
RBC # BLD AUTO: 4.24 X10(6)UL
SODIUM SERPL-SCNC: 144 MMOL/L (ref 136–145)
WBC # BLD AUTO: 4.6 X10(3) UL (ref 4–11)

## 2025-03-29 PROCEDURE — 99239 HOSP IP/OBS DSCHRG MGMT >30: CPT | Performed by: HOSPITALIST

## 2025-03-29 RX ORDER — POTASSIUM CHLORIDE 1500 MG/1
40 TABLET, EXTENDED RELEASE ORAL EVERY 4 HOURS
Status: COMPLETED | OUTPATIENT
Start: 2025-03-29 | End: 2025-03-29

## 2025-03-29 NOTE — PLAN OF CARE
Pt is A&Ox4. VSS, afebrile. SPO2 maintained on RA. IV steroids. Tele, NSR. Last BM 3/28. General/vegetarian diet. Voids. Up ad rico. Denies pain. PIV, SL. No further needs at this time, continue POC. Safety precautions in place.    Problem: RESPIRATORY - ADULT  Goal: Achieves optimal ventilation and oxygenation  Description: INTERVENTIONS:- Assess for changes in respiratory status- Assess for changes in mentation and behavior- Position to facilitate oxygenation and minimize respiratory effort- Oxygen supplementation based on oxygen saturation or ABGs- Provide Smoking Cessation handout, if applicable- Encourage broncho-pulmonary hygiene including cough, deep breathe, Incentive Spirometry- Assess the need for suctioning and perform as needed- Assess and instruct to report SOB or any respiratory difficulty- Respiratory Therapy support as indicated- Manage/alleviate anxiety- Monitor for signs/symptoms of CO2 retention  Outcome: Progressing     Problem: PAIN - ADULT  Goal: Verbalizes/displays adequate comfort level or patient's stated pain goal  Description: INTERVENTIONS:- Encourage pt to monitor pain and request assistance- Assess pain using appropriate pain scale- Administer analgesics based on type and severity of pain and evaluate response- Implement non-pharmacological measures as appropriate and evaluate response- Consider cultural and social influences on pain and pain management- Manage/alleviate anxiety- Utilize distraction and/or relaxation techniques- Monitor for opioid side effects- Notify MD/LIP if interventions unsuccessful or patient reports new pain- Anticipate increased pain with activity and pre-medicate as appropriate  Outcome: Progressing     Problem: SAFETY ADULT - FALL  Goal: Free from fall injury  Description: INTERVENTIONS:- Assess pt frequently for physical needs- Identify cognitive and physical deficits and behaviors that affect risk of falls.- [place] fall precautions as indicated by  assessment.- Educate pt/family on patient safety including physical limitations- Instruct pt to call for assistance with activity based on assessment- Modify environment to reduce risk of injury- Provide assistive devices as appropriate- Consider OT/PT consult to assist with strengthening/mobility- Encourage toileting schedule  Outcome: Progressing     Problem: DISCHARGE PLANNING  Goal: Discharge to home or other facility with appropriate resources  Description: INTERVENTIONS:- Identify barriers to discharge w/pt and caregiver- Include patient/family/discharge partner in discharge planning- Arrange for needed discharge resources and transportation as appropriate- Identify discharge learning needs (meds, wound care, etc)- Arrange for interpreters to assist at discharge as needed- Consider post-discharge preferences of patient/family/discharge partner- Complete POLST form as appropriate- Assess patient's ability to be responsible for managing their own health- Refer to Case Management Department for coordinating discharge planning if the patient needs post-hospital services based on physician/LIP order or complex needs related to functional status, cognitive ability or social support system  Outcome: Progressing     Problem: CARDIOVASCULAR - ADULT  Goal: Absence of cardiac arrhythmias or at baseline  Description: INTERVENTIONS:- Continuous cardiac monitoring, monitor vital signs, obtain 12 lead EKG if indicated- Evaluate effectiveness of antiarrhythmic and heart rate control medications as ordered- Initiate emergency measures for life threatening arrhythmias- Monitor electrolytes and administer replacement therapy as ordered  Outcome: Progressing

## 2025-03-29 NOTE — PROGRESS NOTES
03/28/25 2039   Spontaneous Parameters   $ Spontaneous Vital Capacity 2.3   Negative Inspiratory Force -20

## 2025-03-29 NOTE — PLAN OF CARE
Patient is A&O x4. SR/SB on tele. Up at rico. RA. Regular/vegetarian diet. Lovenox subcutaneous. PO steroids. IVIG administered earlier than scheduled per neurology. Seizure precautions in place. Call light within reach. Patient updated on POC. No further needs at this time.   Problem: RESPIRATORY - ADULT  Goal: Achieves optimal ventilation and oxygenation  Description: INTERVENTIONS:- Assess for changes in respiratory status- Assess for changes in mentation and behavior- Position to facilitate oxygenation and minimize respiratory effort- Oxygen supplementation based on oxygen saturation or ABGs- Provide Smoking Cessation handout, if applicable- Encourage broncho-pulmonary hygiene including cough, deep breathe, Incentive Spirometry- Assess the need for suctioning and perform as needed- Assess and instruct to report SOB or any respiratory difficulty- Respiratory Therapy support as indicated- Manage/alleviate anxiety- Monitor for signs/symptoms of CO2 retention  Outcome: Progressing     Problem: PAIN - ADULT  Goal: Verbalizes/displays adequate comfort level or patient's stated pain goal  Description: INTERVENTIONS:- Encourage pt to monitor pain and request assistance- Assess pain using appropriate pain scale- Administer analgesics based on type and severity of pain and evaluate response- Implement non-pharmacological measures as appropriate and evaluate response- Consider cultural and social influences on pain and pain management- Manage/alleviate anxiety- Utilize distraction and/or relaxation techniques- Monitor for opioid side effects- Notify MD/LIP if interventions unsuccessful or patient reports new pain- Anticipate increased pain with activity and pre-medicate as appropriate  Outcome: Progressing     Problem: SAFETY ADULT - FALL  Goal: Free from fall injury  Description: INTERVENTIONS:- Assess pt frequently for physical needs- Identify cognitive and physical deficits and behaviors that affect risk of falls.-  [place] fall precautions as indicated by assessment.- Educate pt/family on patient safety including physical limitations- Instruct pt to call for assistance with activity based on assessment- Modify environment to reduce risk of injury- Provide assistive devices as appropriate- Consider OT/PT consult to assist with strengthening/mobility- Encourage toileting schedule  Outcome: Progressing     Problem: DISCHARGE PLANNING  Goal: Discharge to home or other facility with appropriate resources  Description: INTERVENTIONS:- Identify barriers to discharge w/pt and caregiver- Include patient/family/discharge partner in discharge planning- Arrange for needed discharge resources and transportation as appropriate- Identify discharge learning needs (meds, wound care, etc)- Arrange for interpreters to assist at discharge as needed- Consider post-discharge preferences of patient/family/discharge partner- Complete POLST form as appropriate- Assess patient's ability to be responsible for managing their own health- Refer to Case Management Department for coordinating discharge planning if the patient needs post-hospital services based on physician/LIP order or complex needs related to functional status, cognitive ability or social support system  Outcome: Progressing     Problem: CARDIOVASCULAR - ADULT  Goal: Absence of cardiac arrhythmias or at baseline  Description: INTERVENTIONS:- Continuous cardiac monitoring, monitor vital signs, obtain 12 lead EKG if indicated- Evaluate effectiveness of antiarrhythmic and heart rate control medications as ordered- Initiate emergency measures for life threatening arrhythmias- Monitor electrolytes and administer replacement therapy as ordered  Outcome: Progressing

## 2025-03-29 NOTE — PROGRESS NOTES
[facility] Neurology Progress Note    Elham Sotelo Patient Status:  Inpatient    1970 MRN [id number]   Location [facility] 5NW-A Attending Madhav Dudley,    Hosp Day # 4 PCP Phillip Hawthorne MD     SUBJECTIVE:  She continues to feel better each day.    MEDICATIONS:  Prior to Admission Medications   Medication Sig    pyridostigmine 30 MG Oral Tab Take 3 tablets (90 mg total) by mouth in the morning, at noon, and at bedtime.    predniSONE 20 MG Oral Tab Take 1 tablet (20 mg total) by mouth daily with breakfast.     Current Facility-Administered Medications   Medication Dose Route Frequency    predniSONE (Deltasone) tab 20 mg  20 mg Oral Daily with breakfast    potassium chloride (Klor-Con) 20 MEQ oral powder 40 mEq  40 mEq Oral Once    levETIRAcetam (Keppra) tab 1,000 mg  1,000 mg Oral BID    topiramate (TopaMAX) tab 100 mg  100 mg Oral BID    pyridostigmine (Mestinon) tab 90 mg  90 mg Oral TID    multivitamin (Tab-A-Tuyet/Beta Carotene) tab 1 tablet  1 tablet Oral Daily    thiamine (Vitamin B1) tab 100 mg  100 mg Oral Daily    acetaminophen (Tylenol) tab 650 mg  650 mg Oral See Admin Instructions    diphenhydrAMINE (Benadryl) cap/tab 50 mg  50 mg Oral See Admin Instructions    enoxaparin (Lovenox) 40 MG/0.4ML SUBQ injection 40 mg  40 mg Subcutaneous Daily    acetaminophen (Tylenol Extra Strength) tab 500 mg  500 mg Oral Q4H PRN    ondansetron (Zofran) 4 MG/2ML injection 4 mg  4 mg Intravenous Q6H PRN       PHYSICAL EXAMINATION:  VITAL SIGNS: /61 (BP Location: Right arm)   Pulse 58   Temp 97.4 °F (36.3 °C) (Oral)   Resp 18   Ht 66\"   Wt 150 lb 12.7 oz (68.4 kg)   LMP 2012 (LMP Unknown)   SpO2 99%   BMI 24.34 kg/m²   GENERAL:  Patient is a 54 year old female in no acute distress.    NEUROLOGICAL:   Mental status: Oriented to person, place, and time  Speech & Language: Fluent, no dysarthria  Comprehension: Intact  Cranial Nerves: VFF, PERRL, EOMI, no nystagmus, facial  sensation intact, face symmetric, tongue midline, shoulder shrug equal  Motor: tone, bulk, strength are normal in all flexors and extensors   Sensory: Intact to light touch in all limbs  Coordination: FTN intact  Tendon Reflexes: normal for habitus, no asymmetry  Gait: Deferred    DIAGNOSTIC DATA:  Labs:  Recent Labs   Lab 03/27/25 0335 03/28/25  0741 03/29/25  0635   RBC 4.36 4.27 4.24   HGB 12.7 12.6 12.5   HCT 37.7 37.4 36.9   MCV 86.5 87.6 87.0   MCH 29.1 29.5 29.5   MCHC 33.7 33.7 33.9   RDW 12.3 12.9 13.2   NEPRELIM 4.65 4.30 1.51   WBC 6.0 5.8 4.6   .0 240.0 216.0         Recent Labs   Lab 03/27/25 0335 03/28/25  0741 03/28/25  2336 03/29/25  0635   * 120*  --  90   BUN 8* 10  --  9   CREATSERUM 0.63 0.69  --  0.65   EGFRCR 105 102  --  104   CA 9.3 9.3  --  9.0    145  --  144   K 3.3*  3.3* 3.4*  3.4* 3.0* 3.9   * 111  --  113*   CO2 23.0 23.0  --  24.0         IMAGING:  XR CHEST AP PORTABLE  (CPT=71045)    Result Date: 3/27/2025  CONCLUSION:  Stable cardiac and mediastinal contours.  Subsegmental left basilar atelectasis without pulmonary edema or acute airspace disease.  The pleural spaces are clear.   LOCATION:  [facility]      Dictated by (CST): Shine Springer MD on 3/27/2025 at 6:28 AM     Finalized by (CST): Shine Springer MD on 3/27/2025 at 6:29 AM       CT CHEST (W+WO) (HXT=77547)    Result Date: 3/26/2025  CONCLUSION:   1. No suspicious nodule, mass or consolidation.  2. Minimal infiltration the anterior mediastinal fat is stable.  No evidence of significant residual thymus.  No significant changes since prior exam.    LOCATION:  [facility]   Dictated by (CST): Phillip Park MD on 3/26/2025 at 3:03 PM     Finalized by (CST): Phillip Park MD on 3/26/2025 at 3:06 PM       XR CHEST AP PORTABLE  (CPT=71045)    Result Date: 3/25/2025  CONCLUSION:  No active disease seen.   LOCATION:  [id number]      Dictated by (CST): Luis Carlos Li MD on 3/25/2025 at 4:45 PM     Finalized by  (CST): Luis Carlos Li MD on 3/25/2025 at 4:45 PM             ASSESSMENT:  Ms. Sotelo is a 54-year-old woman with myasthenia gravis and adrenal insufficiency who likely has multifactorial weakness in the setting of one or both conditions being essentially undertreated.     PLAN:  Principal Problem:    Myasthenia gravis in crisis (HCC)  Active Problems:    Seizure disorder (HCC)    Myasthenia gravis (HCC)    Gastroesophageal reflux disease    MG (myasthenia gravis) ([facility])    Malnutrition ([facility])  Today is her last day of IVIG.  She continues to make improvement and she was encouraged to use the incentive spirometer, including at home, to build strength and stamina with breathing.    She was encouraged to continue to take pyridostigmine 90 mg 3 times a day and prednisone 20 mg daily for now at least until she visits with Dr. Brenner.    She can dismiss from the hospital at the discretion of her attending physician.  We will plan to see her in clinic.    German Granado MD

## 2025-03-29 NOTE — DISCHARGE SUMMARY
[facility]     Elham Sotelo Patient Status:  Inpatient    1970 MRN [id number]   Location [facility] 5NW-A Attending No att. providers found   Hosp Day # 4 PCP Phillip Hawthorne MD     Date of Admission: 3/25/2025  Date of Discharge:  3/29/2025     Discharge Disposition: Home or Self Care    Discharge Diagnosis:  Myasthenia gravis flare  Hypokalemia  Steroid induced hyperglycemia  H/o adrenal insufficiency  Mild persistent asthma  GERD  Seizure disorder     History of Present Illness: Elham Sotelo is a 54 year old female admitted with Shortness of breath, generalized weakness, difficulty speaking and difficulty swallowing, myasthenia gravis exacerbation.  Current symptoms started 2 days back and progressively worsening according to patient.  Seen with patient's  at bedside who is also helping with history.  Complains of tingling in bilateral legs.  Speech problems since yesterday with slurring words since yesterday morning according to patient.  Worse as the day went on according to patient.  Patient states she had mild headache the day before.  Feels extremely tired.  Patient states she feels her mouth is full when talking.  Has shortness of breath and generalized weakness which is worse with minimal exertion and with deep breathing or conversation.  Denies any associated fever or chills.  Denies any cold or cough symptoms.  Denies any recent change of medications.  Denies any recent long trips.  Denies any nausea vomiting or diarrhea.  Patient and  states patient had similar episode of myasthenia gravis exacerbation before which improved with IVIG treatment, patient states she called her regular outpatient primary Dr. Hawthorne and Neurologist Dr. Brenner and was directed to ER.  Patient seen and admitted while still in ER    Brief Synopsis:     #Myasthenia gravis flare  -Mestinon, steroids per neuro  -Completed 5 doses of IVIG  -Monitor respiratory  status  -Ok for regular diet per SLP     #Hypokalemia  -Replace PRN     #Steroid induced hyperglycemia      #H/o adrenal insufficiency      #Mild persistent asthma     #GERD  -PPI     #Seizure disorder  -Keppra     #Disposition  -Stable for DC home    Lace+ Score: 53  59-90 High Risk  29-58 Medium Risk  0-28   Low Risk       TCM Follow-Up Recommendation:  LACE 29-58: Moderate Risk of readmission after discharge from the hospital.      Procedures during hospitalization:   None    Incidental or significant findings and recommendations (brief descriptions):  None    Lab/Test results pending at Discharge:   None    Consultants:  Neurology    Discharge Medication List:     Discharge Medications        START taking these medications        Instructions Prescription details   predniSONE 20 MG Tabs  Commonly known as: Deltasone      Take 1 tablet (20 mg total) by mouth daily with breakfast.   Quantity: 30 tablet  Refills: 2            CHANGE how you take these medications        Instructions Prescription details   pyridostigmine Bromide 30 MG Tabs  Commonly known as: Mestinon  What changed:   medication strength  how much to take  when to take this      Take 3 tablets (90 mg total) by mouth in the morning, at noon, and at bedtime.   Quantity: 30 tablet  Refills: 2            CONTINUE taking these medications        Instructions Prescription details   indomethacin 25 MG Caps  Commonly known as: Indocin      Take 1 capsule (25 mg total) by mouth 3 (three) times daily as needed.   Quantity: 30 capsule  Refills: 3     levetiracetam 1000 MG Tabs  Commonly known as: KEPPRA      Take 1 tablet (1,000 mg total) by mouth 2 (two) times daily.   Refills: 0     topiramate 100 MG Tabs  Commonly known as: TopaMAX      Take 1 tablet (100 mg total) by mouth 2 (two) times daily.   Refills: 0               Where to Get Your Medications        These medications were sent to [id number] - [place], [place] - [address], [phone], [phone]  [address] [place] [address]      Phone: [phone]   predniSONE 20 MG Tabs  pyridostigmine Bromide 30 MG Tabs         [place]    Follow-up appointment:   Malachi Brenner MD  [address] [facility] [address]  [facility] [address] [phone]    Schedule an appointment as soon as possible for a visit in 1 month(s)  f/u appt    Appointments for Next 30 Days 3/29/2025 - 2025      None            Vital signs:  Temp:  [97.4 °F (36.3 °C)-98 °F (36.7 °C)] 98 °F (36.7 °C)  Pulse:  [58-75] 61  Resp:  [17-20] 17  BP: ()/(46-71) 102/55  SpO2:  [97 %-100 %] 100 %    Physical Exam:    General: No acute distress   Lungs: clear to auscultation  Cardiovascular: S1, S2  Abdomen: Soft    -----------------------------------------------------------------------------------------------  PATIENT DISCHARGE INSTRUCTIONS: See electronic chart    Madhav Dudley DO    Total time spent on discharge plannin minutes     The  [place] C[employer] Act makes medical notes like these available to patients in the interest of transparency. Please be advised this is a medical document. Medical documents are intended to carry relevant information, facts as evident, and the clinical opinion of the practitioner. The medical note is intended as peer to peer communication and may appear blunt or direct. It is written in medical language and may contain abbreviations or verbiage that are unfamiliar.

## 2025-03-31 ENCOUNTER — PATIENT OUTREACH (OUTPATIENT)
Dept: CASE MANAGEMENT | Age: 55
End: 2025-03-31

## 2025-03-31 ENCOUNTER — TELEPHONE (OUTPATIENT)
Dept: NEUROLOGY | Facility: CLINIC | Age: 55
End: 2025-03-31

## 2025-03-31 ENCOUNTER — LAB ENCOUNTER (OUTPATIENT)
Dept: LAB | Age: 55
End: 2025-03-31
Attending: Other
Payer: COMMERCIAL

## 2025-03-31 DIAGNOSIS — G70.00 MYASTHENIA GRAVIS (HCC): ICD-10-CM

## 2025-03-31 DIAGNOSIS — G70.00 MYASTHENIA GRAVIS (HCC): Primary | ICD-10-CM

## 2025-03-31 PROCEDURE — 86381 MITOCHONDRIAL ANTIBODY EACH: CPT

## 2025-03-31 PROCEDURE — 36415 COLL VENOUS BLD VENIPUNCTURE: CPT

## 2025-03-31 PROCEDURE — 83519 RIA NONANTIBODY: CPT

## 2025-03-31 NOTE — TELEPHONE ENCOUNTER
Patient had recent MG flare-up with admission to [facility] for 4 nights and IVIG treatment.    Spoke to patient who has improved but still has breathing issue which comes with each MG flare. Was advised that it may take a couple of weeks for that to resolve.  Patient does have office visit here with Dr Brenner on 6/25/2025.  At this time, we do not have any openings, but will attempt to find a slot for a follow up.  Advised that for now, we can place her on a wait list in the event of a cancellation. Patient agreed to this.  Informed  staff.    Per Dr Brenner, ordered Myasthenia Gravis Reflex Panel.  Informed patient to complete.  Verbalized understanding.

## 2025-03-31 NOTE — PROGRESS NOTES
Hospital follow up.    Malachi Brenner M.D.  Neurology  [facility]  [address] [place] [address]  [phone]  Wednesday 6/25 @10:40  Existing appointment.    Phillip Hawthorne MD  Internal Medicine  [facility] [place], [place] [address] [phone]  Patient does not wish to follow up.    Confirmed with patient.    Closing encounter.

## 2025-03-31 NOTE — TELEPHONE ENCOUNTER
Jayden from [facility] calling for clarification on quantity of pyridostigmine 30mg tab ordered at time of hospital discharge by Jes PARSON.    Current Rx is :  pyridostigmine 30 MG Oral Tab 30 tablet 2 3/28/2025 --    Sig - Route: Take 3 tablets (90 mg total) by mouth in the morning, at noon, and at bedtime.      The above is only a 3 day supply with 2 refills.    Advised Jayedn that I would clarify qty with ordering provider and call back with update.    Message sent to provider for clarification.

## 2025-03-31 NOTE — TELEPHONE ENCOUNTER
Patient called was in the [facility] and had Myasthenia Gravis crisis. Saw Dr. German Granado, per Dr. Granado she needs to be seen within the month. We scheduled 8/20/25. (She also has a regular follow up in June). Please discuss with Dr. Brenner and move up the 8/20/25 appointment to be seen within this month. Please return her call at [phone].

## 2025-03-31 NOTE — TELEPHONE ENCOUNTER
Per Jes, Rx should be for a full 30-day supply plus 2 additional refills (#270/2).  Relayed this clarification to pharmacist Jayden at [facility].

## 2025-04-08 LAB — 3A4 METABOLIC ACTIVITY: NORMAL

## 2025-04-14 LAB
ACHR BINDING ABS: <0.07 NMOL/L
ACHR BLOCKING ABS: 11 %
ACHR-MODULATING AB: 0 %
MUSK ABS, SERUM: <1 U/ML
STRIATIONAL ABS, SERUM: NEGATIVE

## 2025-04-16 ENCOUNTER — TELEPHONE (OUTPATIENT)
Dept: NEUROLOGY | Facility: CLINIC | Age: 55
End: 2025-04-16

## 2025-04-16 DIAGNOSIS — G70.00 MYASTHENIA (HCC): Primary | ICD-10-CM

## 2025-04-16 DIAGNOSIS — R06.02 SHORTNESS OF BREATH: ICD-10-CM

## 2025-04-16 NOTE — TELEPHONE ENCOUNTER
Patient stated she is having increased fatigue and breathing issues.    Please call patient to discuss and advise.

## 2025-04-16 NOTE — TELEPHONE ENCOUNTER
Dx Myasthenia Gravis, Pseudoseizures,Trigeminal Autonomic Cephalgia  Rx pyridostigmine 90 mg TID, Prednisone 20 mg daily, IVIG, finished 5 day treatment on 3/24/3035.    Patient is tired and has SOB for 2 days.  Happens mostly when bending over.  Patient is in retail.  Denies fever, chills, cough,  Also noticed yesterday when taking pills, had difficulty swallowing. Must concentrate on swallowing. Never had to think about swallowing.  Last IVIG 3/25/2025.  Could not eat after last infusion for some time.  Was having trouble talking prior to last IVIG, also had blue hands.    Routed to provider.

## 2025-04-16 NOTE — PROGRESS NOTES
Patient was hospitalized because of shortness of breath and actually underwent IVIG treatment but she continues to be symptomatic.  I reviewed the records that indicated normal chest CT without any thymus.    MG panel was negative or normal.  She was taking 60 mg every 4 hours while awake of the Mestinon and was increased to 90 mg 3 times a day.  She denies any abdominal cramping or diarrhea.    She sounded not hypophonic at all during the whole telephone conversation that I was with her.    I will send for full pulmonary function test  I will schedule her for EMG upper extremities and perhaps facial nerve study with repetitive stimulation.        Procedures    MISCELLANEOUS LAB TESTING [[facility] ONLY]   LRP4 and Contactin antibodies    Malachi Brenner MD  Vascular & General Neurology  Multiple Sclerosis & related disorders  [facility] [place]  4/16/2025, Time completed 4:17 PM

## 2025-04-16 NOTE — TELEPHONE ENCOUNTER
Per Dr Brenner, he would like to see patient next week. Scheduled patient for 4/24/2025 @ 240 PM.  Called patient with no answer.  Sent Ajalinet message to patient.    Patient is currently speaking with provider on another line.  Change of plan, patient to schedule EMG and we will not see patient on 4/24 after all.     Routed to  to schedule EMG.

## 2025-04-25 NOTE — PATIENT INSTRUCTIONS
Refill policies:    • Allow 2-3 business days for refills; controlled substances may take longer.   • Contact your pharmacy at least 5 days prior to running out of medication and have them send an electronic request or submit request through the “request re Depending on your insurance carrier, approval may take 3-10 days. It is highly recommended patients contact their insurance carrier directly to determine coverage.   If test is done without insurance authorization, patient may be responsible for the entire 4 = No assist / stand by assistance

## 2025-05-01 ENCOUNTER — PROCEDURE VISIT (OUTPATIENT)
Dept: NEUROLOGY | Facility: CLINIC | Age: 55
End: 2025-05-01
Payer: COMMERCIAL

## 2025-05-01 DIAGNOSIS — R20.2 NUMBNESS AND TINGLING IN BOTH HANDS: ICD-10-CM

## 2025-05-01 DIAGNOSIS — R20.0 NUMBNESS AND TINGLING IN BOTH HANDS: ICD-10-CM

## 2025-05-01 DIAGNOSIS — G70.00 MYASTHENIA GRAVIS (HCC): Primary | ICD-10-CM

## 2025-05-01 PROCEDURE — 95886 MUSC TEST DONE W/N TEST COMP: CPT | Performed by: OTHER

## 2025-05-01 PROCEDURE — 95910 NRV CNDJ TEST 7-8 STUDIES: CPT | Performed by: OTHER

## 2025-05-01 NOTE — PROGRESS NOTES
[facility]   [address] [place] [address]  [phone]    Fax  [phone]    Electrophysiologic Consultation      Patient: Elham Sotelo  5/1/2025  YOB: 1970    Referred by:  Dr Hawthorne and Hospitalist    Reason for testing/History:   54 year oldfemale, she was admitted for \"WEAKNESS\" in March    I saw her in January and she had taken a job working at [employer] and found it difficult to take her MESTINON every 4 hours so she was put on MESTINON timespan and took it 180 once a day    She had IVIG and got \"stronger\" and sent home on prednisone and she is now on MESTINON 90 mg TID (7AM, 3PM and 10 PM).  She is again weak.  Last night, she was eating dinner at 8PM and could barely finish her food.    Her hands and feet gets tingly at times, They turn blue often      RESULTS:   Right superficial peroneal sensory conduction study was normal.  As carpal median to ulnar studies on the right upper extremities were also normal.  Right median distal motor latency, amplitude and velocity was normal.  Right ulnar distal motor latency amplitude and velocities were normal.  Repetitive stimulation on the right ulnar nerve showed no decremental responses  Right peroneal and tibial nerve motor conduction studies were normal.  Monopolar needle electrode studies of the right upper extremity and right lower extremities failed to reveal any acute    IMPRESSION:    Normal electrophysiologic studies without any large fiber neuropathic or radicular process seen.  No decremental responses noted on repetitive stimulation.        Malachi Brenner MD  Neurology    Data:    Sensory NCS      Nerve / Sites Rec. Site Onset Lat Peak Lat NP Amp PP Amp Segments Distance Peak Diff Velocity Comment     ms ms µV µV  cm ms m/s    R Superficial peroneal - (Antidromic)      Lat leg Ankle 3.13 3.81 4.6 6.0 Lat leg - Ankle 14  45    R Median, Ulnar - Transcarpal comparison      Median Palm Wrist 1.56  2.08 79.8 104.9 Median Palm - Wrist 8  51       Ulnar Palm Wrist 1.46 1.98 29.5 46.0 Ulnar Palm - Wrist 8  55          Median Palm - Ulnar Palm  0.10         Motor NCS      Nerve / Sites Muscle Latency Amplitude Segments Dist. Lat Diff Velocity Comments     ms mV  cm ms m/s    R Median - APB      Wrist APB 3.13 10.7 Wrist - APB 8         Elbow APB 7.15 10.1 Elbow - Wrist 23 4.02 57.2    R Ulnar - ADM      Wrist ADM 2.73 12.8 Wrist - ADM 8         B.Elbow ADM 6.31 13.2 B.Elbow - Wrist 21.5 3.58 60.0       A.Elbow ADM 8.46 9.8 A.Elbow - B.Elbow 11.5 2.15 53.6    R Peroneal - EDB      Ankle EDB 4.23 4.5 Ankle - EDB 8         B. Fib Head EDB 10.88 4.2 B. Fib Head - Ankle 32 6.65 48.2       A. Fib Head EDB 12.71 4.0 A. Fib Head - B. Fib Head 10 1.83 54.5    R Tibial - AH      Ankle AH 4.23 6.8 Ankle - AH 8         Knee AH 11.98 3.7 Knee - Ankle 38 7.75 49.0        Rep Stim      Anatomy / Train Rate Amplitude ? Amp 4-1 Fac Ampl Area ?Area 4-1 Fac Area    Hz mV % % mVms % %   R Ulnar - ADM   Baseline 3 12.1 1.5 100 33.2 -5.3 100   Post Exercise @ 0:00 3 11.6 5.9 95.8 27.9 7.7 84.1       EMG Summary Table     Spontaneous MUAP Recruitment   Muscle IA Fib PSW Fasc H.F. Amp Dur. PPP Pattern   R. Gastrocnemius N None None None None N N N N   R. Quadriceps N None None None None N N N N   R. Tibialis anterior N None None None None N N N N   R. Extensor hallucis longus N None None None None N N N N   R. Peroneus longus N None None None None N N N N   R. Deltoid N None None None None N N N N   R. Biceps brachii N None None None None N N N N   R. Triceps brachii N None None None None N N N N   R. First dorsal interosseous N None None None None N N N N   R. Abductor pollicis brevis N None None None None N N N N

## 2025-05-01 NOTE — PATIENT INSTRUCTIONS
Pyridostigmine every 4hours while awake      Get LAB test done before leaving for [facility]

## 2025-06-23 ENCOUNTER — LAB ENCOUNTER (OUTPATIENT)
Dept: LAB | Age: 55
End: 2025-06-23
Attending: Other
Payer: COMMERCIAL

## 2025-06-23 DIAGNOSIS — G70.00 MYASTHENIA (HCC): ICD-10-CM

## 2025-06-23 DIAGNOSIS — R06.02 SHORTNESS OF BREATH: ICD-10-CM

## 2025-06-23 PROCEDURE — 86255 FLUORESCENT ANTIBODY SCREEN: CPT

## 2025-06-23 PROCEDURE — 86256 FLUORESCENT ANTIBODY TITER: CPT

## 2025-06-23 PROCEDURE — 36415 COLL VENOUS BLD VENIPUNCTURE: CPT

## 2025-06-25 ENCOUNTER — LAB ENCOUNTER (OUTPATIENT)
Dept: LAB | Facility: HOSPITAL | Age: 55
End: 2025-06-25
Attending: Other
Payer: COMMERCIAL

## 2025-06-25 ENCOUNTER — OFFICE VISIT (OUTPATIENT)
Dept: NEUROLOGY | Facility: CLINIC | Age: 55
End: 2025-06-25
Payer: COMMERCIAL

## 2025-06-25 ENCOUNTER — RT VISIT (OUTPATIENT)
Dept: RESPIRATORY THERAPY | Facility: HOSPITAL | Age: 55
End: 2025-06-25
Attending: Other
Payer: COMMERCIAL

## 2025-06-25 VITALS
HEIGHT: 66 IN | RESPIRATION RATE: 16 BRPM | DIASTOLIC BLOOD PRESSURE: 57 MMHG | HEART RATE: 72 BPM | WEIGHT: 150 LBS | BODY MASS INDEX: 24.11 KG/M2 | SYSTOLIC BLOOD PRESSURE: 101 MMHG

## 2025-06-25 DIAGNOSIS — R06.02 SHORTNESS OF BREATH: ICD-10-CM

## 2025-06-25 DIAGNOSIS — G44.099 TRIGEMINAL AUTONOMIC CEPHALGIAS: ICD-10-CM

## 2025-06-25 DIAGNOSIS — G40.909 SEIZURE DISORDER (HCC): Primary | ICD-10-CM

## 2025-06-25 DIAGNOSIS — G70.00 MYASTHENIA GRAVIS (HCC): ICD-10-CM

## 2025-06-25 DIAGNOSIS — R20.0 NUMBNESS AND TINGLING IN BOTH HANDS: ICD-10-CM

## 2025-06-25 DIAGNOSIS — G40.909 SEIZURE DISORDER (HCC): ICD-10-CM

## 2025-06-25 DIAGNOSIS — R20.2 NUMBNESS AND TINGLING IN BOTH HANDS: ICD-10-CM

## 2025-06-25 DIAGNOSIS — G70.00 MYASTHENIA (HCC): ICD-10-CM

## 2025-06-25 PROCEDURE — 3078F DIAST BP <80 MM HG: CPT | Performed by: OTHER

## 2025-06-25 PROCEDURE — 80177 DRUG SCRN QUAN LEVETIRACETAM: CPT

## 2025-06-25 PROCEDURE — 99214 OFFICE O/P EST MOD 30 MIN: CPT | Performed by: OTHER

## 2025-06-25 PROCEDURE — 80201 ASSAY OF TOPIRAMATE: CPT

## 2025-06-25 PROCEDURE — 3074F SYST BP LT 130 MM HG: CPT | Performed by: OTHER

## 2025-06-25 PROCEDURE — 36415 COLL VENOUS BLD VENIPUNCTURE: CPT

## 2025-06-25 PROCEDURE — 3008F BODY MASS INDEX DOCD: CPT | Performed by: OTHER

## 2025-06-25 NOTE — PATIENT INSTRUCTIONS
Refill policies:    Allow 2-3 business days for refills; controlled substances may take longer.  Contact your pharmacy at least 5 days prior to running out of medication and have them send an electronic request or submit request through the “request refill” option in your [employer] account.  Refills are not addressed on weekends; covering physicians do not authorize routine medications on weekends.  No narcotics or controlled substances are refilled after noon on Fridays or by on call physicians.  By law, narcotics must be electronically prescribed.  A 30 day supply with no refills is the maximum allowed.  If your prescription is due for a refill, you may be due for a follow up appointment.  To best provide you care, patients receiving routine medications need to be seen at least once a year.  Patients receiving narcotic/controlled substance medications need to be seen at least once every 3 months.  In the event that your preferred pharmacy does not have the requested medication in stock (e.g. Backordered), it is your responsibility to find another pharmacy that has the requested medication available.  We will gladly send a new prescription to that pharmacy at your request.    Scheduling Tests:    If your physician has ordered radiology tests such as MRI or CT scans, please contact Central Scheduling at [phone] right away to schedule the test.  Once scheduled, the [facility] Centralized Referral Team will work with your insurance carrier to obtain pre-certification or prior authorization.  Depending on your insurance carrier, approval may take 3-10 days.  It is highly recommended patients assure they have received an authorization before having a test performed.  If test is done without insurance authorization, patient may be responsible for the entire amount billed.      Precertification and Prior Authorizations:  If your physician has recommended that you have a procedure or additional testing performed the [facility]  Centralized Referral Team will contact your insurance carrier to obtain pre-certification or prior authorization.    You are strongly encouraged to contact your insurance carrier to verify that your procedure/test has been approved and is a COVERED benefit.  Although the [facility] Centralized Referral Team does its due diligence, the insurance carrier gives the disclaimer that \"Although the procedure is authorized, this does not guarantee payment.\"    Ultimately the patient is responsible for payment.   Thank you for your understanding in this matter.  Paperwork Completion:  If you require FMLA or disability paperwork for your recovery, please make sure to either drop it off or have it faxed to our office at [phone]. Be sure the form has your name and date of birth on it.  The form will be faxed to our Forms Department and they will complete it for you.  There is a 25$ fee for all forms that need to be filled out.  Please be aware there is a 10-14 day turnaround time.  You will need to sign a release of information (SEKOU) form if your paperwork does not come with one.  You may call the Forms Department with any questions at [phone].  Their fax number is [phone].

## 2025-06-25 NOTE — PROGRESS NOTES
NEUROLOGY  [facility]       Elham Sotelo  7/2/1970  Primary Care Provider:  Phillip Hawthorne MD    6/25/2025  54 year old yo,     Last visit:  Jan 2025  Assessment & plans last visit:  MG and recurrent \"spells\"  Trigeminal autonomic cephalgia pain    Previous visit and existing record notes reviewed in preparation for the face to face visit.  Relevant labs and studies reviewed and will be noted in relevant areas of this record.  Chaperoned visit:     (x) No.      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     She has the sharp pains in the right eye and was put on Indomethacin and she does not remember it.    Past History Reviewed & update/new problem(s): as above    Review of Systems:  Review of Systems:  Denies systemic symptoms  She has tingling in legs and EMG in May was normal.       No CP or SOB.  No GI or  symptoms. Relevant Neuro as noted above.      Medications:    Medications - Current[1]  PRN: PRN Medications[2]    Allergies:  Allergies[3]       EXAM:  /57 (BP Location: Left arm, Patient Position: Sitting, Cuff Size: adult)   Pulse 72   Resp 16   Ht 66\"   Wt 150 lb (68 kg)   LMP 02/23/2012 (LMP Unknown)   BMI 24.21 kg/m²   Looks stated age  General Exam:  HENT:  pink conjunctiva anicteric sclerae  Neck no adenopathy, thyroid normal  Heart and Lungs:  normal  Extremities: no cyanosis, skin changes    NEURO  NEURO  Able to relate events with fluent speech and intact comprehension  CN 2-12: pupils reactive, VF full face symmetric sensation and movement tongue midline  No motor focal findings  Sensory: no lateralizing findings  Reflexes are  symmetric  UMN signs: none  Gait: narrow based          INTERPRETATION of RELEVANT LABS and other DATA:          Problem/s Identified this visit:   1. Seizure disorder (HCC)    2. Myasthenia gravis (HCC)    3. Numbness and tingling in both hands    4. Trigeminal autonomic cephalgias          Discussion plus Diagnostics & Treatment Orders:  She is going to try indomethacin as previously prescribed for the sharp pain she experiences in the right eye and right temple.  We are going to get blood levels for the levetiracetam and Flomax to make sure they are therapeutic or more importantly they are not toxic.  Then MRI imaging of the brain just to make sure were not dealing with anything structural  Myasthenia is stable at this time    NOTE:  she has been known to have pseudoseizures    (x) Discussed potential side effects of any treatment relevant to above.  Includes explanation of tests as necessary.    3 months follow up      Patient understands that if needed, based on condition and or test results, follow up will be readjusted      Malachi Brenner MD  Vascular & General Neurology  Director, Multiple Sclerosis Program  [facility]  6/25/2025, Time completed 11:01 AM    Decision making:  ( x ) labs reviewed/ordered - 1  (  ) new diagnosis: - 1  ( x) Images & studies independently reviewed -non F2F  (  ) Case/studies discussed with other caregivers - -non F2F  (  ) Telephone time with patiern or authorized Fam member--non F2F  ( x ) other records reviewed --non F2F including consultations  (  ) [facility] meetings - patient not present --non F2F  (  ) Independent Historian obtained    Non Face to Face CPT code 21713/97182 applies as documented above    PROCEDURE DONE     (   ) see notes        After visit, patient was escorted out and handed-off discharge process and instructions to the check out desk.  No additional issues relevant to visit were raised to staff at this time interval.        This document is  to be interpreted as my current opinion regarding the case as of the stated date of service based on the information available to me at this time and may supersedes any prior opinion expressed either orally or in writing.  Services rendered are only within the scope of direct medical care  Sometimes, reports may have been prepared partially using a speech recognition software technology.  If a word or phrase is confusing or out of context, please do not hesitate to call for clarification.              [1]   Current Outpatient Medications:     pyridostigmine 30 MG Oral Tab, Take 3 tablets (90 mg total) by mouth in the morning, at noon, and at bedtime., Disp: 30 tablet, Rfl: 2    predniSONE 20 MG Oral Tab, Take 1 tablet (20 mg total) by mouth daily with breakfast., Disp: 30 tablet, Rfl: 2    levetiracetam 1000 MG Oral Tab, Take 1 tablet (1,000 mg total) by mouth 2 (two) times daily., Disp: , Rfl:     topiramate 100 MG Oral Tab, Take 1 tablet (100 mg total) by mouth 2 (two) times daily., Disp: , Rfl:   [2] [3]   Allergies  Allergen Reactions    Pcns [Penicillins] RASH

## 2025-06-26 LAB — CASPR2 ANTIBODY,CELL-BASED IFA: NEGATIVE

## 2025-06-27 LAB
LEVETIRACETAM LVL: 42.4 UG/ML
TOPIRAMATE LVL: 13.6 UG/ML

## 2025-06-29 PROBLEM — R06.02 SHORTNESS OF BREATH: Status: ACTIVE | Noted: 2025-06-29

## 2025-06-29 PROCEDURE — 94010 BREATHING CAPACITY TEST: CPT | Performed by: INTERNAL MEDICINE

## 2025-06-29 PROCEDURE — 94726 PLETHYSMOGRAPHY LUNG VOLUMES: CPT | Performed by: INTERNAL MEDICINE

## 2025-06-29 NOTE — PROCEDURES
Pulmonary Function Test:     Elham Sotelo is a 54 year old female who presents for evaluation of shortness of breath myasthenia gravis      Findings:  Spirometry: Pre-bronchodilator FEV1 is 1.63 L, 61% predicted.  With a Z-score of -2.30 FVC is 2.10 L, 63% with a Z-score of -2.31 predicted and FEV1/ FVC ratio is 0.77.l.   The flow-volume loop demonstrates a restrictive pattern.   MVV is 71 L/min or 72%of predicted  Lung Volumes:                                                                     The TLC is 4.31 L, 78% predicted.  With a Z-score of -1.95  The residual volume 2.14 L, 122% predicted.  RV/TLC ratio is50% (>40% suggests air trapping)   Diffusion Capacity:  The patient could not perform DLCO maneuver because he felt very weak and dizzy    Impression:  There is mild airway restrictive process  on spirometry and visualized on flow-volume loop.    MVV is  reduced that can be due to  upper airway obstruction, respiratory muscle weakness, obstructive lung disease, or poor maneuver performance, clinical correlation is suggested     Lung volumes show mild restriction with total lung capacity of 4.31 L,  With a Z-score of -1.95 this can be seen in heart failure, fluid overload states, interstitial lung disease, thoracic spine/chest disorders, obesity as well as other clinical scenarios.  Further clinical correlation required. ERV is reduced likely due to chest wall restriction    RV/TLC ratio is  50 % (>40% suggests air trapping)     Diffusion capacity maneuver could not be performed by the patient due to weakness    There are no previous pulmonary function tests available for comparison.     Disclaimer: This PFT has been interpreted in accordance to ATS/ERS interpretation guidelines 2022, with the use of upper and lower limits of normal as well as z-score references. Previous testing (before March 2024) was not performed at [facility] using z-scores and so comparison to previous testing should be  taken with caution.    Burton Quintero MD

## 2025-07-01 ENCOUNTER — HOSPITAL ENCOUNTER (EMERGENCY)
Facility: HOSPITAL | Age: 55
Discharge: HOME | End: 2025-07-01
Attending: EMERGENCY MEDICINE
Payer: COMMERCIAL

## 2025-07-01 ENCOUNTER — HOSPITAL ENCOUNTER (OUTPATIENT)
Dept: MRI IMAGING | Facility: HOSPITAL | Age: 55
Discharge: HOME | End: 2025-07-01
Attending: Other
Payer: COMMERCIAL

## 2025-07-01 ENCOUNTER — APPOINTMENT (OUTPATIENT)
Dept: CT IMAGING | Facility: HOSPITAL | Age: 55
End: 2025-07-01
Attending: EMERGENCY MEDICINE
Payer: COMMERCIAL

## 2025-07-01 VITALS
SYSTOLIC BLOOD PRESSURE: 112 MMHG | HEART RATE: 58 BPM | TEMPERATURE: 98 F | OXYGEN SATURATION: 99 % | DIASTOLIC BLOOD PRESSURE: 63 MMHG | RESPIRATION RATE: 16 BRPM

## 2025-07-01 DIAGNOSIS — R20.0 NUMBNESS AND TINGLING IN BOTH HANDS: ICD-10-CM

## 2025-07-01 DIAGNOSIS — G44.099 TRIGEMINAL AUTONOMIC CEPHALGIAS: ICD-10-CM

## 2025-07-01 DIAGNOSIS — G40.909 SEIZURE DISORDER (HCC): ICD-10-CM

## 2025-07-01 DIAGNOSIS — R20.2 NUMBNESS AND TINGLING IN BOTH HANDS: ICD-10-CM

## 2025-07-01 DIAGNOSIS — R07.89 CHEST PAIN, ATYPICAL: Primary | ICD-10-CM

## 2025-07-01 DIAGNOSIS — R55 SYNCOPE, NEAR: ICD-10-CM

## 2025-07-01 DIAGNOSIS — G70.00 MG (MYASTHENIA GRAVIS) (HCC): ICD-10-CM

## 2025-07-01 LAB
ALBUMIN SERPL-MCNC: 4.1 G/DL (ref 3.2–4.8)
ALBUMIN/GLOB SERPL: 1.9 {RATIO} (ref 1–2)
ALP LIVER SERPL-CCNC: 81 U/L (ref 41–108)
ALT SERPL-CCNC: 7 U/L (ref 10–49)
ANION GAP SERPL CALC-SCNC: 9 MMOL/L (ref 0–18)
AST SERPL-CCNC: 14 U/L (ref ?–34)
ATRIAL RATE: 78 BPM
BASOPHILS # BLD AUTO: 0.04 X10(3) UL (ref 0–0.2)
BASOPHILS NFR BLD AUTO: 0.8 %
BILIRUB SERPL-MCNC: 0.2 MG/DL (ref 0.3–1.2)
BUN BLD-MCNC: 5 MG/DL (ref 9–23)
BUN/CREAT SERPL: 7.8 (ref 10–20)
CALCIUM BLD-MCNC: 8.8 MG/DL (ref 8.7–10.4)
CHLORIDE SERPL-SCNC: 111 MMOL/L (ref 98–112)
CO2 SERPL-SCNC: 21 MMOL/L (ref 21–32)
CREAT BLD-MCNC: 0.64 MG/DL (ref 0.55–1.02)
DEPRECATED RDW RBC AUTO: 40 FL (ref 35.1–46.3)
EGFRCR SERPLBLD CKD-EPI 2021: 104 ML/MIN/1.73M2 (ref 60–?)
EOSINOPHIL # BLD AUTO: 0.13 X10(3) UL (ref 0–0.7)
EOSINOPHIL NFR BLD AUTO: 2.6 %
ERYTHROCYTE [DISTWIDTH] IN BLOOD BY AUTOMATED COUNT: 11.9 % (ref 11–15)
GLOBULIN PLAS-MCNC: 2.2 G/DL (ref 2–3.5)
GLUCOSE BLD-MCNC: 84 MG/DL (ref 70–99)
GLUCOSE BLDC GLUCOMTR-MCNC: 75 MG/DL (ref 70–99)
HCT VFR BLD AUTO: 41.7 % (ref 35–48)
HGB BLD-MCNC: 13.7 G/DL (ref 12–16)
IMM GRANULOCYTES # BLD AUTO: 0 X10(3) UL (ref 0–1)
IMM GRANULOCYTES NFR BLD: 0 %
LYMPHOCYTES # BLD AUTO: 2.33 X10(3) UL (ref 1–4)
LYMPHOCYTES NFR BLD AUTO: 46.6 %
MAGNESIUM SERPL-MCNC: 2.3 MG/DL (ref 1.6–2.6)
MCH RBC QN AUTO: 30.3 PG (ref 26–34)
MCHC RBC AUTO-ENTMCNC: 32.9 G/DL (ref 31–37)
MCV RBC AUTO: 92.3 FL (ref 80–100)
MONOCYTES # BLD AUTO: 0.3 X10(3) UL (ref 0.1–1)
MONOCYTES NFR BLD AUTO: 6 %
NEUTROPHILS # BLD AUTO: 2.2 X10 (3) UL (ref 1.5–7.7)
NEUTROPHILS # BLD AUTO: 2.2 X10(3) UL (ref 1.5–7.7)
NEUTROPHILS NFR BLD AUTO: 44 %
OSMOLALITY SERPL CALC.SUM OF ELEC: 288 MOSM/KG (ref 275–295)
P AXIS: 0 DEGREES
P-R INTERVAL: 156 MS
PLATELET # BLD AUTO: 250 10(3)UL (ref 150–450)
POTASSIUM SERPL-SCNC: 4 MMOL/L (ref 3.5–5.1)
PROT SERPL-MCNC: 6.3 G/DL (ref 5.7–8.2)
Q-T INTERVAL: 382 MS
QRS DURATION: 86 MS
QTC CALCULATION (BEZET): 435 MS
R AXIS: 53 DEGREES
RBC # BLD AUTO: 4.52 X10(6)UL (ref 3.8–5.3)
SODIUM SERPL-SCNC: 141 MMOL/L (ref 136–145)
T AXIS: 45 DEGREES
TROPONIN I SERPL HS-MCNC: 3 NG/L (ref ?–34)
VENTRICULAR RATE: 78 BPM
WBC # BLD AUTO: 5 X10(3) UL (ref 4–11)

## 2025-07-01 PROCEDURE — 71260 CT THORAX DX C+: CPT | Performed by: RADIOLOGY

## 2025-07-01 PROCEDURE — 70553 MRI BRAIN STEM W/O & W/DYE: CPT | Performed by: RADIOLOGY

## 2025-07-01 PROCEDURE — 99222 1ST HOSP IP/OBS MODERATE 55: CPT | Performed by: OTHER

## 2025-07-01 RX ORDER — GADOTERATE MEGLUMINE 376.9 MG/ML
15 INJECTION INTRAVENOUS
Status: COMPLETED | OUTPATIENT
Start: 2025-07-01 | End: 2025-07-01

## 2025-07-01 RX ORDER — KETOROLAC TROMETHAMINE 15 MG/ML
15 INJECTION, SOLUTION INTRAMUSCULAR; INTRAVENOUS ONCE
Status: COMPLETED | OUTPATIENT
Start: 2025-07-01 | End: 2025-07-01

## 2025-07-01 RX ADMIN — GADOTERATE MEGLUMINE 15 ML: 376.9 INJECTION INTRAVENOUS at 12:41:00

## 2025-07-01 NOTE — ED PROVIDER NOTES
Patient Seen in: [facility] Emergency Department        History  Chief Complaint   Patient presents with    Chest Pain Angina     Stated Complaint:     Subjective:   HPI            54-year-old female with a complicated show including seizure history as well as myasthenia gravis following a neurologist Alejandro who is here for an outpatient MRI today, they had completed the plain brain MRI but shortly after they injected her with gadolinium she reportedly became unresponsive or less responsive and was briefly confused.  They did not report that she had seizure-like activity.  The patient states she just feels globally very weak and tired right now.  She does have a little right facial droop which she attributes to her known myasthenia gravis history.      Objective:     No pertinent past medical history.            Past Surgical History:   Procedure Laterality Date    Ir angiogram cerebral carotid bilateral  2020           April 2001    x 1    Open rx heel fracture Left     Other surgical history      cyst removed right hand                Social History     Socioeconomic History    Marital status:     Number of children: 1   Occupational History    Occupation: Homemaker   Tobacco Use    Smoking status: Never     Passive exposure: Never    Smokeless tobacco: Never   Vaping Use    Vaping status: Never Used   Substance and Sexual Activity    Alcohol use: No     Alcohol/week: 0.0 standard drinks of alcohol    Drug use: No   Other Topics Concern    Caffeine Concern Yes     Comment: 1 a day    Exercise Yes     Comment: work walking     Social Drivers of Health     Food Insecurity: No Food Insecurity (3/25/2025)    NCSS - Food Insecurity     Worried About Running Out of Food in the Last Year: No     Ran Out of Food in the Last Year: No   Transportation Needs: No Transportation Needs (3/25/2025)    NCSS - Transportation     Lack of Transportation: No   Housing Stability: Not At Risk (3/25/2025)     NCSS - Housing/Utilities     Has Housing: Yes     Worried About Losing Housing: No     Unable to Get Utilities: No                                Physical Exam    ED Triage Vitals [07/01/25 1243]   /87   Pulse 66   Resp 18   Temp 97.9 °F (36.6 °C)   Temp src Temporal   SpO2 100 %   O2 Device None (Room air)       Current Vitals:   Vital Signs  BP: 112/75  Pulse: 63  Resp: 19  Temp: 97.9 °F (36.6 °C)  Temp src: Temporal  MAP (mmHg): 85    Oxygen Therapy  SpO2: 100 %  O2 Device: None (Room air)            Physical Exam  Constitutional: Oriented to person, place, and time.  Appears well-developed. No distress.   Head: Normocephalic and atraumatic.   Eyes: Conjunctivae are normal. Pupils are equal, round, and reactive to light.   Neck: Normal range of motion. Neck supple.   Cardiovascular: Normal rate, regular rhythm and intact distal pulses.    Pulmonary/Chest: Effort normal. No respiratory distress.   Abdominal: Soft. There is no tenderness. There is no guarding.   Musculoskeletal: Normal range of motion. No edema or tenderness.   Neurological: Alert and oriented to person, place, and time.  Slight right eyelid droop, no other gross focal deficits.  Globally weak in all extremities.  Seems very tired.  Skin: Skin is warm and dry.   Psychiatric: Normal mood and affect.  Behavior is normal.   Nursing note and vitals reviewed.    Differential diagnosis includes generalized seizure, myasthenia gravis exacerbation, dehydration, electrolyte abnormality, UTI.          ED Course  Labs Reviewed   COMP METABOLIC PANEL (14) - Abnormal; Notable for the following components:       Result Value    BUN 5 (*)     BUN/CREA Ratio 7.8 (*)     ALT 7 (*)     Bilirubin, Total 0.2 (*)     All other components within normal limits   MAGNESIUM - Normal   TROPONIN I HIGH SENSITIVITY - Normal   POCT GLUCOSE - Normal   CBC WITH DIFFERENTIAL WITH PLATELET   RAINBOW DRAW LAVENDER   RAINBOW DRAW LIGHT GREEN   RAINBOW DRAW BLUE      EKG    Rate, intervals and axes as noted on EKG Report.  Rate: 78  Rhythm: Sinus Rhythm  Reading: No acute ischemic change                                MDM             Medical Decision Making  Seen by Dr. Cain here.  She recommended dose of her usual Mestinon.  She states all of her EEGs have been normal and these are PNES likely.  She feels that the patient's improved she can go home if her cardiac workup is negative.  Patient got up and walked here.  She got her afternoon Mestinon dose.  She feels better and feels comfortable going home.  She will follow-up with her neurologist.  She did request a neurology referral here    She did like Dr. Cain very much and also her neurologist is retiring.  She should continue prescribe indications.  Tylenol for pain.  She will come back with worsening or change    Problems Addressed:  Chest pain, atypical: acute illness or injury  MG (myasthenia gravis) (HCC): chronic illness or injury  Syncope, near: acute illness or injury    Amount and/or Complexity of Data Reviewed  Labs: ordered. Decision-making details documented in ED Course.  Radiology: ordered and independent interpretation performed. Decision-making details documented in ED Course.     Details: By my review of the patient's CTA of the chest I do not appreciate obvious evidence of pneumothorax, pleural effusion infiltrate or obvious central PE  ECG/medicine tests: ordered and independent interpretation performed. Decision-making details documented in ED Course.    Risk  OTC drugs.  Prescription drug management.  Decision regarding hospitalization.        Disposition and Plan     Clinical Impression:  1. Chest pain, atypical    2. Syncope, near    3. MG (myasthenia gravis) (HCC)         Disposition:  Discharge  7/1/2025  4:53 pm    Follow-up:  Phillip Hawthorne MD  [address] [facility] [address]  [phone]    Call      Malachi Brenner MD  [address] [facility]  [address]  [phone]    Call      Rekha Cain DO  [address] [place] [id number]  [phone]    Schedule an appointment as soon as possible for a visit  As needed          Medications Prescribed:  Current Discharge Medication List                Supplementary Documentation:

## 2025-07-01 NOTE — ED QUICK NOTES
Rounding Completed    Plan of Care reviewed. Waiting for RESULTS.    Bed is locked and in lowest position. Call light within reach.
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
## 2025-07-01 NOTE — ED INITIAL ASSESSMENT (HPI)
Pt to ED from outpatient MRI, originally called in as a Code Blue. Per staff, pt completed MRI for seizure disorder and numbness and tingling in both hands, and while laying on bed, pt appears to be near syncopal. Pt arrives in room 41 complaining of chest pain, appears lethargic. Pt has drooping to left eye and blurry vision that comes and goes, pt states could be related to her myasthenia gravis.

## 2025-07-02 ENCOUNTER — TELEPHONE (OUTPATIENT)
Dept: NEUROLOGY | Facility: CLINIC | Age: 55
End: 2025-07-02

## 2025-07-02 NOTE — TELEPHONE ENCOUNTER
S: Patient experienced shortness of breath, chest pain and flare of myasthenia gravis and was briefly unresponsive after receiving contrast dye for MRI.     B: Was in ER yesterday due to reaction from MRI contrast. Was given scheduled dose of mestinon. Requested a referral to see Dr. Cain. Last seen Dr. Brenner on 6/25/25. MRI with and without of Brain was ordered.     A: Able to complete MRI of brain without contrast. Patient reports continuing to feel tired, but denies chest pain and any vision changes or Myasthenia Gravis symptoms aside from baseline.     R: is completed MRI portion sufficient. IF MRI with contrast needed, please order pre-medications, patient questioning if f/u appointment is needed?

## 2025-07-02 NOTE — TELEPHONE ENCOUNTER
Shared with patient that she is welcome to transfer care to Dr. Cain and is more likely to get an earlier appointment.

## 2025-07-02 NOTE — TELEPHONE ENCOUNTER
Patient stated had side effects from the imaging dye and went to the ED and advised to call Dr. Brenner with this update.    Please call patient to discuss details and advise.

## 2025-07-12 ENCOUNTER — HOSPITAL ENCOUNTER (OUTPATIENT)
Dept: MAMMOGRAPHY | Facility: HOSPITAL | Age: 55
Discharge: HOME | End: 2025-07-12
Attending: SPECIALIST
Payer: COMMERCIAL

## 2025-07-12 DIAGNOSIS — Z12.31 ENCOUNTER FOR SCREENING MAMMOGRAM FOR MALIGNANT NEOPLASM OF BREAST: ICD-10-CM

## 2025-07-12 PROCEDURE — 77067 SCR MAMMO BI INCL CAD: CPT | Performed by: SPECIALIST

## 2025-07-12 PROCEDURE — 77063 BREAST TOMOSYNTHESIS BI: CPT | Performed by: SPECIALIST

## 2025-07-21 ENCOUNTER — HOSPITAL ENCOUNTER (OUTPATIENT)
Dept: ULTRASOUND IMAGING | Facility: HOSPITAL | Age: 55
Discharge: HOME | End: 2025-07-21
Attending: SPECIALIST
Payer: COMMERCIAL

## 2025-07-21 DIAGNOSIS — R92.8 ABNORMAL MAMMOGRAM: ICD-10-CM

## 2025-07-21 PROCEDURE — 76642 ULTRASOUND BREAST LIMITED: CPT | Performed by: SPECIALIST

## 2025-08-05 ENCOUNTER — LAB ENCOUNTER (OUTPATIENT)
Dept: LAB | Age: 55
End: 2025-08-05
Attending: STUDENT IN AN ORGANIZED HEALTH CARE EDUCATION/TRAINING PROGRAM

## 2025-08-05 DIAGNOSIS — R06.02 SHORTNESS OF BREATH: ICD-10-CM

## 2025-08-05 DIAGNOSIS — R07.9 CHEST PAIN, UNSPECIFIED: Primary | ICD-10-CM

## 2025-08-05 LAB
BUN BLD-MCNC: 11 MG/DL (ref 9–23)
CREAT BLD-MCNC: 0.66 MG/DL (ref 0.55–1.02)
EGFRCR SERPLBLD CKD-EPI 2021: 103 ML/MIN/1.73M2 (ref 60–?)

## 2025-08-05 PROCEDURE — 36415 COLL VENOUS BLD VENIPUNCTURE: CPT

## 2025-08-05 PROCEDURE — 84520 ASSAY OF UREA NITROGEN: CPT

## 2025-08-05 PROCEDURE — 82565 ASSAY OF CREATININE: CPT

## (undated) NOTE — Clinical Note
March 29, 2025    Patient: Elham Sotelo   Date of Visit: 3/25/2025       To Whom It May Concern:    Elham Sotelo was seen and treated in our emergency department on 3/25/2025. She {Return to school/sport/work:[id number]}.    If you have any questions or concerns, please don't hesitate to call.       Encounter Provider(s):    Andi Thompson MD Joseph, Manju, MD Ghelani, Neal, DO

## (undated) NOTE — MR AVS SNAPSHOT
After Visit Summary   10/29/2019    Leti Riley    MRN: [id number]           Visit Information     Date & Time  10/29/2019  1:00 PM Provider  Amy Rico, [address] [place] [address] Neurodiagnostics Dept.  Phone  [phone] including white bread, rice and pasta   Eat plenty of protein, keep the fat content low Sugars:  sodas and sports drinks, candies and desserts   Eat plenty of low-fat dairy products High fat meats and dairy   Choose whole grain products Foods high in sodiu [address]     Treatment for mild  illness or injury that does  not require immediate attention.           Average cost  $70*       VIDEO VISITS  Visit face-to-face with a [facility] physician or [facility]  using your mobile device or computer   using [employer]      [employer]

## (undated) NOTE — ED AVS SNAPSHOT
Mrs. Aure Nicholas   MRN: [id number]    Department:  [facility] Emergency Department   Date of Visit:  12/31/2017           Disclosure     Insurance plans vary and the physician(s) referred by the ER may not be covered by your plan.  Please tell this physician (or your personal doctor if your instructions are to return to your personal doctor) about any new or lasting problems. The primary care or specialist physician will see patients referred from the [facility] Emergency Department.  Tomas Alejandro

## (undated) NOTE — LETTER
December 31, 2017    Patient: Vy Loredo   Date of Visit: 12/31/2017       To Whom It May Concern:    Vy Loredo was seen and treated in our emergency department on 12/31/2017. She should not return to work until Monday Jan 8.     If

## (undated) NOTE — LETTER
March 29, 2025    Patient: Elham Sotelo   Date of Visit: 3/25/2025       To Whom It May Concern:    Elham Sotelo was seen and treated in our emergency department on 3/25/2025. She can return to work on 04/01/25 and is encouraged to take frequent rests breaks and stay well hydrated .    If you have any questions or concerns, please don't hesitate to call.       Encounter Provider(s):    Andi Thompson MD Joseph, Manju, MD Ghelani, Neal, DO

## (undated) NOTE — ED AVS SNAPSHOT
Mrs. Mata Hearing   MRN: [id number]    Department:  [facility] Emergency Department   Date of Visit:  3/15/2019           Disclosure     Insurance plans vary and the physician(s) referred by the ER may not be covered by your plan.  Please c tell this physician (or your personal doctor if your instructions are to return to your personal doctor) about any new or lasting problems. The primary care or specialist physician will see patients referred from the [facility] Emergency Department.  Chalino White

## (undated) NOTE — LETTER
[facility] [address], [address]  Consent for Procedure/Sedation    Date:     Time:       1.  I authorize the performance upon Vik Flores the following:  CEREBRAL ANGIOGRAM    2. I authorize Dr. Lubna Brewer (and whomever is d ________________________________    ___________________    Witness: _________________________      Date: ___________________    Printed: 6/15/2020   9:33 AM  Patient Name: Royal Charles        : 1970       Medical Record #: [id number]

## (undated) NOTE — IP AVS SNAPSHOT
Patient Demographics     Address  [address] [address] Phone  [phone] [facility])  [phone] (Mobile) *Preferred* E-mail Address  rip [email or web address]      Emergency Contact(s)     Name Relation Home Work Alireza 2238 TAKE 1 TABLET BY MOUTH DAILY AS NEEDED FOR MIGRAINE. Leti Moncada MD         topiramate 50 MG Tabs  Commonly known as:  TOPAMAX      Take 100 mg by mouth 2 (two) times daily.                    [id number] - MAR ACTION REPORT  (last 24 hrs)    ** SIT LEFT LOWER ABDOMEN     Order ID Medication Name Action Time Action Reason Comments    [id number] Heparin Sodium (Porcine) 5000 UNIT/ML injection 5,000 Units 07/17/19 2117 Given      [id number] Heparin Sodium (Porcine) 5000 UNIT/ML injection 5,000 Unit PATIENT'S NAME: Braden Shine PHYSICIAN: Venice Hendrix M.D.    PATIENT ACCOUNT#:   [de-identified]    LOCATION:  [address] [place] [address]  MEDICAL RECORD #:   [id number]       YOB: 1970  ADMISSION DATE:       07/13/2019    HISTORY AN LABORATORY DATA:  Sodium 145, potassium 3.3, chloride 115, BUN 12, creatinine 0.7. WBC 5.7, hemoglobin 14, platelet 931,768. IMPRESSION:    3   A 50-year-old female, currently admitted with myasthenia gravis with possible exacerbation.   Plan: Smitha Mosley fatigued much more easily. Reports that she was out shopping with her  and had to use a wheelchair due to LE weakness this past week. Also reports worsening SOB. No falls. She states that her ptosis seems to be getting worse.  Has had a myasthenia wo • Extrinsic asthma, unspecified    • Fibromyalgia    • Migraines    • Osteoarthrosis, unspecified whether generalized or localized, unspecified site    • Personal history of other musculoskeletal disorders(V13.59)    • [facility]    • Vitamin D Cardiovascular: Regular rate and rhythm, no murmur  Pulm: CTAB  GI: non-tender, normal bowel sounds  Skin: normal, dry  Extremities: No clubbing or cyanosis      Neurologic:   MENTAL STATUS: alert, ox3, normal attention, language and fund of knowledge. - Continue Home Keppra 500/1000[NM.2]    Franklin Morgan DO  Neurology[NM.1]    Electronically signed by Lydia Wylie DO on 7/14/2019  1:26 PM   Attribution Best    NM. 1 - Lydia Wylie DO on 7/14/2019  1:10 PM  NM. 2 - Lydia Wylie DO • Osteoarthrosis, unspecified whether generalized or localized, unspecified site    • Personal history of other musculoskeletal disorders(V13.59)    • [facility] disorder ([facility]    • Vitamin D deficiency        Past Surgical History  Past Surgical History:   P Gait Assistance: Contact guard assist;Supervision  Distance (ft): 120  Assistive Device: Rolling walker  Pattern: Shuffle;R Decreased stance time;R Foot flat;L Foot flat  Stoop/Curb Assistance: Not tested       Skilled Therapy Provided: RN approved session The -PAC '6-Clicks' Inpatient Basic Mobility Short Form was completed and this patient is demonstrating a 29.97% degree of impairment in mobility. Research supports that patients with this level of impairment may benefit from home.       DISCHARGE RECOMME in February and continuing to be ind working full time, until about 2 weeks ago when pt began to feel weakness in LEs and this has progressed to the point where pt has been having difficulty getting around at home and negotiating the stairs.      Problem Deletasia Maloney How much difficulty does the patient currently have. ..  -   Turning over in bed (including adjusting bedclothes, sheets and blankets)?: None   -   Sitting down on and standing up from a chair with arms (e.g., wheelchair, bedside commode, etc.): None   - encouraged to change positions frequently 2/2 back pain. Pt educated on use of call light and fall prevention, RW in room adjusted for height. Pt left in chair, needs met. Cold pack provided for back.      THERAPEUTIC EXERCISES  Lower Extremity LAQ     Up Physical Therapy Note signed by Houston Newby PT at 7/15/2019  7:11 PM  Version 1 of 1    Author:  Houston Newby PT Service:  Ge Woodward Author Type:  Physical Therapist    Filed:  7/15/2019  7:11 PM Date of Service:  7/15/2019  6:32 PM Status:  Signed    Dieter Railing: Yes    Lives With: Spouse  Drives: Yes  Patient Owned Equipment: Cane  Patient Regularly Uses: None    Prior Level of [place]: per pt she was ind with all ADLs and functional mobility PTA.  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 MD educated and verbalized understanding. Given safety with diet and baseline abilities- discontinue speech therapy services. [LO.1]     Diet Recommendations - Solids: Regular(choose softer solids)  Diet Recommendatio

## (undated) NOTE — ED AVS SNAPSHOT
Mrs. Tinsley Service   MRN: [id number]    Department:  [facility] Emergency Department   Date of Visit:  1/11/2019           Disclosure     Insurance plans vary and the physician(s) referred by the ER may not be covered by your plan.  Please c tell this physician (or your personal doctor if your instructions are to return to your personal doctor) about any new or lasting problems. The primary care or specialist physician will see patients referred from the [facility] Emergency Department.  Nelia Howard

## (undated) NOTE — ED AVS SNAPSHOT
Mrs. Tyler Magallon   MRN: [id number]    Department:  [facility] Emergency Department   Date of Visit:  12/25/2017           Disclosure     Insurance plans vary and the physician(s) referred by the ER may not be covered by your plan.  Please tell this physician (or your personal doctor if your instructions are to return to your personal doctor) about any new or lasting problems. The primary care or specialist physician will see patients referred from the [facility] Emergency Department.  Ita Pulido

## (undated) NOTE — ED AVS SNAPSHOT
Mrs. Easton Melvin   MRN: [id number]    Department:  [facility] Emergency Department   Date of Visit:  4/8/2018           Disclosure     Insurance plans vary and the physician(s) referred by the ER may not be covered by your plan.  Please co tell this physician (or your personal doctor if your instructions are to return to your personal doctor) about any new or lasting problems. The primary care or specialist physician will see patients referred from the [facility] Emergency Department.  Genna Bautista

## (undated) NOTE — LETTER
[facility] [address], [address]  Consent for Procedure/Sedation    Date: 6/9/2020    Time: 0741      I authorize the performance upon Jocelyn Puentes the following: Conventional Diagnostic Cerebral Angiogram with possible b 7. In the event your procedure results in extended X-Ray/fluoroscopy time, you may develop a skin reaction.     Signature of Patient: _______________________________________________________    Signature of person authorized